# Patient Record
Sex: FEMALE | Race: ASIAN | NOT HISPANIC OR LATINO | URBAN - METROPOLITAN AREA
[De-identification: names, ages, dates, MRNs, and addresses within clinical notes are randomized per-mention and may not be internally consistent; named-entity substitution may affect disease eponyms.]

---

## 2017-06-21 PROBLEM — Z00.00 ENCOUNTER FOR PREVENTIVE HEALTH EXAMINATION: Status: ACTIVE | Noted: 2017-06-21

## 2020-08-11 ENCOUNTER — INPATIENT (INPATIENT)
Facility: HOSPITAL | Age: 65
LOS: 9 days | Discharge: HOME HEALTH | DRG: 124 | End: 2020-08-21
Attending: GENERAL PRACTICE | Admitting: GENERAL PRACTICE
Payer: MEDICARE

## 2020-08-11 VITALS
HEART RATE: 93 BPM | TEMPERATURE: 98 F | WEIGHT: 205.91 LBS | SYSTOLIC BLOOD PRESSURE: 155 MMHG | OXYGEN SATURATION: 97 % | DIASTOLIC BLOOD PRESSURE: 94 MMHG | HEIGHT: 62 IN | RESPIRATION RATE: 20 BRPM

## 2020-08-11 DIAGNOSIS — K50.919 CROHN'S DISEASE, UNSPECIFIED, WITH UNSPECIFIED COMPLICATIONS: ICD-10-CM

## 2020-08-11 DIAGNOSIS — Z29.9 ENCOUNTER FOR PROPHYLACTIC MEASURES, UNSPECIFIED: ICD-10-CM

## 2020-08-11 DIAGNOSIS — E78.5 HYPERLIPIDEMIA, UNSPECIFIED: ICD-10-CM

## 2020-08-11 DIAGNOSIS — E11.69 TYPE 2 DIABETES MELLITUS WITH OTHER SPECIFIED COMPLICATION: ICD-10-CM

## 2020-08-11 DIAGNOSIS — E11.40 TYPE 2 DIABETES MELLITUS WITH DIABETIC NEUROPATHY, UNSPECIFIED: ICD-10-CM

## 2020-08-11 DIAGNOSIS — E66.01 MORBID (SEVERE) OBESITY DUE TO EXCESS CALORIES: ICD-10-CM

## 2020-08-11 DIAGNOSIS — H57.89 OTHER SPECIFIED DISORDERS OF EYE AND ADNEXA: ICD-10-CM

## 2020-08-11 DIAGNOSIS — L03.211 CELLULITIS OF FACE: ICD-10-CM

## 2020-08-11 DIAGNOSIS — B02.30 ZOSTER OCULAR DISEASE, UNSPECIFIED: ICD-10-CM

## 2020-08-11 DIAGNOSIS — I10 ESSENTIAL (PRIMARY) HYPERTENSION: ICD-10-CM

## 2020-08-11 LAB
ALBUMIN SERPL ELPH-MCNC: 4.3 G/DL — SIGNIFICANT CHANGE UP (ref 3.3–5)
ALP SERPL-CCNC: 62 U/L — SIGNIFICANT CHANGE UP (ref 40–120)
ALT FLD-CCNC: 30 U/L — SIGNIFICANT CHANGE UP (ref 10–45)
ANION GAP SERPL CALC-SCNC: 16 MMOL/L — SIGNIFICANT CHANGE UP (ref 5–17)
AST SERPL-CCNC: 37 U/L — SIGNIFICANT CHANGE UP (ref 10–40)
BASOPHILS # BLD AUTO: 0.06 K/UL — SIGNIFICANT CHANGE UP (ref 0–0.2)
BASOPHILS NFR BLD AUTO: 0.7 % — SIGNIFICANT CHANGE UP (ref 0–2)
BILIRUB SERPL-MCNC: 0.4 MG/DL — SIGNIFICANT CHANGE UP (ref 0.2–1.2)
BUN SERPL-MCNC: 10 MG/DL — SIGNIFICANT CHANGE UP (ref 7–23)
CALCIUM SERPL-MCNC: 10 MG/DL — SIGNIFICANT CHANGE UP (ref 8.4–10.5)
CHLORIDE SERPL-SCNC: 94 MMOL/L — LOW (ref 96–108)
CO2 SERPL-SCNC: 24 MMOL/L — SIGNIFICANT CHANGE UP (ref 22–31)
CREAT SERPL-MCNC: 0.72 MG/DL — SIGNIFICANT CHANGE UP (ref 0.5–1.3)
EOSINOPHIL # BLD AUTO: 0.02 K/UL — SIGNIFICANT CHANGE UP (ref 0–0.5)
EOSINOPHIL NFR BLD AUTO: 0.2 % — SIGNIFICANT CHANGE UP (ref 0–6)
GLUCOSE BLDC GLUCOMTR-MCNC: 201 MG/DL — HIGH (ref 70–99)
GLUCOSE BLDC GLUCOMTR-MCNC: 213 MG/DL — HIGH (ref 70–99)
GLUCOSE SERPL-MCNC: 256 MG/DL — HIGH (ref 70–99)
HCT VFR BLD CALC: 42.9 % — SIGNIFICANT CHANGE UP (ref 34.5–45)
HGB BLD-MCNC: 13.5 G/DL — SIGNIFICANT CHANGE UP (ref 11.5–15.5)
IMM GRANULOCYTES NFR BLD AUTO: 0.7 % — SIGNIFICANT CHANGE UP (ref 0–1.5)
LYMPHOCYTES # BLD AUTO: 1.84 K/UL — SIGNIFICANT CHANGE UP (ref 1–3.3)
LYMPHOCYTES # BLD AUTO: 20.1 % — SIGNIFICANT CHANGE UP (ref 13–44)
MCHC RBC-ENTMCNC: 27.1 PG — SIGNIFICANT CHANGE UP (ref 27–34)
MCHC RBC-ENTMCNC: 31.5 GM/DL — LOW (ref 32–36)
MCV RBC AUTO: 86 FL — SIGNIFICANT CHANGE UP (ref 80–100)
MONOCYTES # BLD AUTO: 0.7 K/UL — SIGNIFICANT CHANGE UP (ref 0–0.9)
MONOCYTES NFR BLD AUTO: 7.6 % — SIGNIFICANT CHANGE UP (ref 2–14)
NEUTROPHILS # BLD AUTO: 6.49 K/UL — SIGNIFICANT CHANGE UP (ref 1.8–7.4)
NEUTROPHILS NFR BLD AUTO: 70.7 % — SIGNIFICANT CHANGE UP (ref 43–77)
NRBC # BLD: 0 /100 WBCS — SIGNIFICANT CHANGE UP (ref 0–0)
PLATELET # BLD AUTO: 389 K/UL — SIGNIFICANT CHANGE UP (ref 150–400)
POTASSIUM SERPL-MCNC: 3.8 MMOL/L — SIGNIFICANT CHANGE UP (ref 3.5–5.3)
POTASSIUM SERPL-SCNC: 3.8 MMOL/L — SIGNIFICANT CHANGE UP (ref 3.5–5.3)
PROT SERPL-MCNC: 7.6 G/DL — SIGNIFICANT CHANGE UP (ref 6–8.3)
RBC # BLD: 4.99 M/UL — SIGNIFICANT CHANGE UP (ref 3.8–5.2)
RBC # FLD: 15.8 % — HIGH (ref 10.3–14.5)
SARS-COV-2 RNA SPEC QL NAA+PROBE: SIGNIFICANT CHANGE UP
SODIUM SERPL-SCNC: 134 MMOL/L — LOW (ref 135–145)
WBC # BLD: 9.17 K/UL — SIGNIFICANT CHANGE UP (ref 3.8–10.5)
WBC # FLD AUTO: 9.17 K/UL — SIGNIFICANT CHANGE UP (ref 3.8–10.5)

## 2020-08-11 PROCEDURE — 99223 1ST HOSP IP/OBS HIGH 75: CPT

## 2020-08-11 PROCEDURE — 99285 EMERGENCY DEPT VISIT HI MDM: CPT

## 2020-08-11 RX ORDER — INSULIN LISPRO 100/ML
VIAL (ML) SUBCUTANEOUS
Refills: 0 | Status: DISCONTINUED | OUTPATIENT
Start: 2020-08-11 | End: 2020-08-21

## 2020-08-11 RX ORDER — GABAPENTIN 400 MG/1
300 CAPSULE ORAL AT BEDTIME
Refills: 0 | Status: DISCONTINUED | OUTPATIENT
Start: 2020-08-11 | End: 2020-08-21

## 2020-08-11 RX ORDER — DORZOLAMIDE HYDROCHLORIDE TIMOLOL MALEATE 20; 5 MG/ML; MG/ML
1 SOLUTION/ DROPS OPHTHALMIC
Refills: 0 | Status: DISCONTINUED | OUTPATIENT
Start: 2020-08-11 | End: 2020-08-21

## 2020-08-11 RX ORDER — INSULIN GLARGINE 100 [IU]/ML
30 INJECTION, SOLUTION SUBCUTANEOUS AT BEDTIME
Refills: 0 | Status: DISCONTINUED | OUTPATIENT
Start: 2020-08-11 | End: 2020-08-14

## 2020-08-11 RX ORDER — AMLODIPINE BESYLATE 2.5 MG/1
5 TABLET ORAL DAILY
Refills: 0 | Status: DISCONTINUED | OUTPATIENT
Start: 2020-08-11 | End: 2020-08-21

## 2020-08-11 RX ORDER — ATENOLOL 25 MG/1
50 TABLET ORAL DAILY
Refills: 0 | Status: DISCONTINUED | OUTPATIENT
Start: 2020-08-11 | End: 2020-08-21

## 2020-08-11 RX ORDER — LISINOPRIL 2.5 MG/1
40 TABLET ORAL DAILY
Refills: 0 | Status: DISCONTINUED | OUTPATIENT
Start: 2020-08-11 | End: 2020-08-13

## 2020-08-11 RX ORDER — DEXTROSE 50 % IN WATER 50 %
25 SYRINGE (ML) INTRAVENOUS ONCE
Refills: 0 | Status: DISCONTINUED | OUTPATIENT
Start: 2020-08-11 | End: 2020-08-21

## 2020-08-11 RX ORDER — ENOXAPARIN SODIUM 100 MG/ML
40 INJECTION SUBCUTANEOUS DAILY
Refills: 0 | Status: DISCONTINUED | OUTPATIENT
Start: 2020-08-11 | End: 2020-08-13

## 2020-08-11 RX ORDER — ACETAMINOPHEN 500 MG
650 TABLET ORAL ONCE
Refills: 0 | Status: COMPLETED | OUTPATIENT
Start: 2020-08-11 | End: 2020-08-11

## 2020-08-11 RX ORDER — MESALAMINE 400 MG
500 TABLET, DELAYED RELEASE (ENTERIC COATED) ORAL
Refills: 0 | Status: DISCONTINUED | OUTPATIENT
Start: 2020-08-11 | End: 2020-08-21

## 2020-08-11 RX ORDER — ACETAMINOPHEN 500 MG
650 TABLET ORAL EVERY 4 HOURS
Refills: 0 | Status: DISCONTINUED | OUTPATIENT
Start: 2020-08-11 | End: 2020-08-21

## 2020-08-11 RX ORDER — DEXTROSE 50 % IN WATER 50 %
15 SYRINGE (ML) INTRAVENOUS ONCE
Refills: 0 | Status: DISCONTINUED | OUTPATIENT
Start: 2020-08-11 | End: 2020-08-21

## 2020-08-11 RX ORDER — KETOROLAC TROMETHAMINE 30 MG/ML
15 SYRINGE (ML) INJECTION ONCE
Refills: 0 | Status: DISCONTINUED | OUTPATIENT
Start: 2020-08-11 | End: 2020-08-11

## 2020-08-11 RX ORDER — INSULIN GLARGINE 100 [IU]/ML
30 INJECTION, SOLUTION SUBCUTANEOUS EVERY MORNING
Refills: 0 | Status: DISCONTINUED | OUTPATIENT
Start: 2020-08-12 | End: 2020-08-14

## 2020-08-11 RX ORDER — ATORVASTATIN CALCIUM 80 MG/1
40 TABLET, FILM COATED ORAL AT BEDTIME
Refills: 0 | Status: DISCONTINUED | OUTPATIENT
Start: 2020-08-11 | End: 2020-08-21

## 2020-08-11 RX ORDER — TRAMADOL HYDROCHLORIDE 50 MG/1
50 TABLET ORAL EVERY 6 HOURS
Refills: 0 | Status: DISCONTINUED | OUTPATIENT
Start: 2020-08-11 | End: 2020-08-13

## 2020-08-11 RX ORDER — ACYCLOVIR SODIUM 500 MG
950 VIAL (EA) INTRAVENOUS ONCE
Refills: 0 | Status: COMPLETED | OUTPATIENT
Start: 2020-08-11 | End: 2020-08-11

## 2020-08-11 RX ORDER — LEVOTHYROXINE SODIUM 125 MCG
75 TABLET ORAL DAILY
Refills: 0 | Status: DISCONTINUED | OUTPATIENT
Start: 2020-08-11 | End: 2020-08-21

## 2020-08-11 RX ORDER — FENOFIBRATE,MICRONIZED 130 MG
145 CAPSULE ORAL DAILY
Refills: 0 | Status: DISCONTINUED | OUTPATIENT
Start: 2020-08-11 | End: 2020-08-21

## 2020-08-11 RX ORDER — SODIUM CHLORIDE 9 MG/ML
1000 INJECTION, SOLUTION INTRAVENOUS
Refills: 0 | Status: DISCONTINUED | OUTPATIENT
Start: 2020-08-11 | End: 2020-08-21

## 2020-08-11 RX ORDER — ACYCLOVIR SODIUM 500 MG
950 VIAL (EA) INTRAVENOUS EVERY 8 HOURS
Refills: 0 | Status: DISCONTINUED | OUTPATIENT
Start: 2020-08-11 | End: 2020-08-12

## 2020-08-11 RX ORDER — PANTOPRAZOLE SODIUM 20 MG/1
40 TABLET, DELAYED RELEASE ORAL
Refills: 0 | Status: DISCONTINUED | OUTPATIENT
Start: 2020-08-11 | End: 2020-08-21

## 2020-08-11 RX ORDER — DEXTROSE 50 % IN WATER 50 %
12.5 SYRINGE (ML) INTRAVENOUS ONCE
Refills: 0 | Status: DISCONTINUED | OUTPATIENT
Start: 2020-08-11 | End: 2020-08-21

## 2020-08-11 RX ORDER — ERYTHROMYCIN BASE 5 MG/GRAM
1 OINTMENT (GRAM) OPHTHALMIC (EYE)
Refills: 0 | Status: DISCONTINUED | OUTPATIENT
Start: 2020-08-11 | End: 2020-08-21

## 2020-08-11 RX ORDER — GLUCAGON INJECTION, SOLUTION 0.5 MG/.1ML
1 INJECTION, SOLUTION SUBCUTANEOUS ONCE
Refills: 0 | Status: DISCONTINUED | OUTPATIENT
Start: 2020-08-11 | End: 2020-08-21

## 2020-08-11 RX ADMIN — TRAMADOL HYDROCHLORIDE 50 MILLIGRAM(S): 50 TABLET ORAL at 23:03

## 2020-08-11 RX ADMIN — ATORVASTATIN CALCIUM 40 MILLIGRAM(S): 80 TABLET, FILM COATED ORAL at 22:18

## 2020-08-11 RX ADMIN — Medication 15 MILLIGRAM(S): at 21:06

## 2020-08-11 RX ADMIN — Medication 650 MILLIGRAM(S): at 15:50

## 2020-08-11 RX ADMIN — Medication 169 MILLIGRAM(S): at 17:13

## 2020-08-11 RX ADMIN — TRAMADOL HYDROCHLORIDE 50 MILLIGRAM(S): 50 TABLET ORAL at 22:18

## 2020-08-11 RX ADMIN — Medication 15 MILLIGRAM(S): at 18:53

## 2020-08-11 RX ADMIN — GABAPENTIN 300 MILLIGRAM(S): 400 CAPSULE ORAL at 22:18

## 2020-08-11 RX ADMIN — INSULIN GLARGINE 30 UNIT(S): 100 INJECTION, SOLUTION SUBCUTANEOUS at 23:30

## 2020-08-11 NOTE — H&P ADULT - PROBLEM SELECTOR PLAN 3
meds reviewed with pt's son and reconciled   will given lantus BID dosing, 30 units  RISS  add premeal as needed  endo as needed  monitor FS  A1C  resume Ozempic on DC   Neurontin for diabetic neuropathy

## 2020-08-11 NOTE — ED PROVIDER NOTE - CLINICAL SUMMARY MEDICAL DECISION MAKING FREE TEXT BOX
63 yo F with a pmhx of Insulin dependent DM, Crohn's disease on immunosuppressants present to the ED with eye redness and rash that started on Friday of last week. Vesicular rash over the left V1 trigeminal distribution included left eye lid and bridge of nose. consistent with herpes ophthalmicus. She has a immunocompromised state because of comorbidities stated in the HPI.      will obtain labs, consult opthalmology

## 2020-08-11 NOTE — ED ADULT NURSE NOTE - NSIMPLEMENTINTERV_GEN_ALL_ED
Implemented All Fall Risk Interventions:  [place] to call system. Call bell, personal items and telephone within reach. Instruct patient to call for assistance. Room bathroom lighting operational. Non-slip footwear when patient is off stretcher. Physically safe environment: no spills, clutter or unnecessary equipment. Stretcher in lowest position, wheels locked, appropriate side rails in place. Provide visual cue, wrist band, yellow gown, etc. Monitor gait and stability. Monitor for mental status changes and reorient to person, place, and time. Review medications for side effects contributing to fall risk. Reinforce activity limits and safety measures with patient and family.

## 2020-08-11 NOTE — ED ADULT NURSE REASSESSMENT NOTE - NS ED NURSE REASSESS COMMENT FT1
pt given sandwich and water.  sitting up eating without difficulty  md sanchez also made aware of pt temp 100.1 F

## 2020-08-11 NOTE — CONSULT NOTE ADULT - ATTENDING COMMENTS
I have interviewed and examined the patient and reviewed the residents note including the history, exam, assessment, and plan.  I agree with the residents assessment and plan.    Agree with above  Will follow    Davida Davenport MD

## 2020-08-11 NOTE — ED ADULT NURSE NOTE - NS ED NURSE LEVEL OF CONSCIOUSNESS ORIENTATION
Number Of Freeze-Thaw Cycles: 2 freeze-thaw cycles Post-Care Instructions: I reviewed with the patient in detail post-care instructions. Patient is to wear sunprotection, and avoid picking at any of the treated lesions. Pt may apply Vaseline to crusted or scabbing areas. Aperture Size (Optional): B Medical Necessity Clause: This procedure was medically necessary because the lesions that were treated were: Duration Of Freeze Thaw-Cycle (Seconds): 5 Render Note In Bullet Format When Appropriate: No Render Post-Care Instructions In Note?: yes Detail Level: Detailed Consent: The patient's consent was obtained including but not limited to risks of crusting, scabbing, blistering, scarring, darker or lighter pigmentary change, recurrence, incomplete removal and infection. Consent: The patient's verbal consent was obtained including but not limited to risks of crusting, scabbing, blistering, scarring, darker or lighter pigmentary change, recurrence, incomplete removal and infection. Duration Of Freeze Thaw-Cycle (Seconds): 6 Medical Necessity Information: It is in your best interest to select a reason for this procedure from the list below. All of these items fulfill various CMS LCD requirements except the new and changing color options. Oriented - self; Oriented - place; Oriented - time

## 2020-08-11 NOTE — ED PROVIDER NOTE - OBJECTIVE STATEMENT
63 yo F with a pmhx of Insulin dependent DM, Crohn's disease on immunosuppressants present to the ED with eye redness and rash that started on friday of last week. She was seen in urgent care and was started on doxycycline. Her rash is localized over her left eye and forehead. She denies rashes anywhere else. She denies any vision changes. She denies any pain upon moving the eye. She denies any CP, SOB, abdominal pain, urinary symptoms, FND, numbness or tingling, tinnitus, ear pain.

## 2020-08-11 NOTE — ED PROVIDER NOTE - PHYSICAL EXAMINATION
GENERAL: no acute distress, non-toxic appearing  HEAD: normocephalic, atraumatic    HEENT: normal conjunctiva, oral mucosa moist, neck supple, (+) vescicular rash over left forehead involving the eyelid included the bridge of the nose, the tip of the nose is spared, poor dentition,     CARDIAC: regular rate and rhythm, normal S1 and S2,  no appreciable murmurs  PULM: clear to ascultation bilaterally, no crackles, rales, rhonchi, or wheezing  GI: abdomen nondistended, soft, nontender, no guarding or rebound tenderness  : no CVA tenderness, no suprapubic tenderness  NEURO: alert and oriented x 3, normal speech, PERRLA, EOMI, no focal motor or sensory deficits  MSK: no visible deformities, no peripheral edema, calf tenderness/redness/swelling  SKIN: no visible rashes, dry, well-perfused  PSYCH: appropriate mood and affect

## 2020-08-11 NOTE — CONSULT NOTE ADULT - SUBJECTIVE AND OBJECTIVE BOX
[facility] DEPARTMENT OF OPHTHALMOLOGY - INITIAL ADULT CONSULT  -----------------------------------------------------------------------------------------------------------------  Quyen Florentino MD PGY-III  Pager: [phone]/[facility]: [phone]  -----------------------------------------------------------------------------------------------------------------    HPI:  65 yo F with a pmhx of Insulin dependent DM, Crohn's disease on immunosuppressants present to the ED with eye redness and rash that started on friday of last week. She reports she saw an eye doctor in [place] yesterday, Dr. Joby Vazquez who started her on tobramycin drops and PO doxycyline and recommended she come to the ED. Her rash is localized over her left eye and forehead. She denies rashes anywhere else. She denies any vision changes. She denies any pain upon moving the eye but reports pain in the left upper lid. She denies any CP, SOB, abdominal pain, urinary symptoms, FND, numbness or tingling, tinnitus, ear pain       PAST MEDICAL & SURGICAL HISTORY: Crohns , DM     Past Ocular History: b/l CEIOL OU    FAMILY HISTORY: denies    Social History: denies    Ophthalmic Medications: as above    Allergies & Intolerances: denies    Review of Systems:  Constitutional: No fever, chills  Eyes: No blurry vision, flashes, floaters, FBS, erythema, discharge, double vision, OU  Neuro: No tremors  Cardiovascular: No chest pain, palpitations  Respiratory: No SOB, no cough  GI: No nausea, vomiting, abdominal pain  : No dysuria  Skin: no rash  Psych: no depression  Endocrine: no polyuria, polydipsia  Heme/lymph: no swelling    VITALS: T(C): 37.3 (08-11-20 @ 11:39)  T(F): 99.2 (08-11-20 @ 11:39), Max: 99.2 (08-11-20 @ 11:39)  HR: 93 (08-11-20 @ 11:39) (93 - 93)  BP: 158/88 (08-11-20 @ 11:39) (155/94 - 158/88)  RR:  (18 - 20)  SpO2:  (97% - 98%)  Wt(kg): --  General: AAO x 3, appropriate mood and affect    Ophthalmology Exam:  Visual acuity (cc): 20/25 OU  Pupils: PERRL OU, no APD  Ttono: 1 OU  Extraocular movements (EOMs): Full OU, no pain, no diplopia  Confrontational Visual Field (CVF): Full OU  Color Plates: 12/12 OU    Pen Light Exam (PLE)  External: Flat OU  Lids/Lashes/Lacrimal Ducts: Flat OU    Sclera/Conjunctiva: W+Q OU  Cornea: Cl OU  Anterior Chamber: D+F OU    Iris: Flat OU  Lens: Cl OU    Fundus Exam: dilated with 1% tropicamide and 2.5% phenylephrine  Approval obtained from primary team for dilation  Patient aware that pupils can remained dilated for at least 4-6 hours  Exam performed with 20D lens    Vitreous: wnl OU  Disc, cup/disc: sharp and pink, 0.4 OU  Macula: wnl OU  Vessels: wnl OU  Periphery: wnl OU    Labs/Imaging:  *** [facility] DEPARTMENT OF OPHTHALMOLOGY - INITIAL ADULT CONSULT  -----------------------------------------------------------------------------------------------------------------  Quyen Florentino MD PGY-III  Pager: [phone]/[facility]: [phone]  -----------------------------------------------------------------------------------------------------------------    HPI:  63 yo F with a pmhx of Insulin dependent DM, Crohn's disease on immunosuppressants present to the ED with eye redness and rash that started on friday of last week. She reports she saw an eye doctor in [place] yesterday, Dr. Joby Vazquez who started her on tobramycin drops and PO doxycyline and recommended she come to the ED. Her rash is localized over her left eye and forehead. She denies rashes anywhere else. She denies any vision changes. She denies any pain upon moving the eye but reports pain in the left upper lid. She denies any CP, SOB, abdominal pain, urinary symptoms, FND, numbness or tingling, tinnitus, ear pain       PAST MEDICAL & SURGICAL HISTORY: Crohns , DM     Past Ocular History: b/l CEIOL OU    FAMILY HISTORY: denies    Social History: denies    Ophthalmic Medications: as above    Allergies & Intolerances: denies    Review of Systems:  Constitutional: No fever, chills  Eyes: No blurry vision, flashes, floaters, FBS, erythema, discharge, double vision, OU  Neuro: No tremors  Cardiovascular: No chest pain, palpitations  Respiratory: No SOB, no cough  GI: No nausea, vomiting, abdominal pain  : No dysuria  Skin: no rash  Psych: no depression  Endocrine: no polyuria, polydipsia  Heme/lymph: no swelling    VITALS: T(C): 37.3 (08-11-20 @ 11:39)  T(F): 99.2 (08-11-20 @ 11:39), Max: 99.2 (08-11-20 @ 11:39)  HR: 93 (08-11-20 @ 11:39) (93 - 93)  BP: 158/88 (08-11-20 @ 11:39) (155/94 - 158/88)  RR:  (18 - 20)  SpO2:  (97% - 98%)  Wt(kg): --  General: AAO x 3, appropriate mood and affect    Ophthalmology Exam:  Visual acuity (cc): 20/25 OU  Pupils: PERRL OU, no APD  Ttono: 15 OD 24 OS  Extraocular movements (EOMs): Full OU, no pain, no diplopia  Confrontational Visual Field (CVF): Full OU  Color Plates: 12/12 OU    Slit Lamp Exam (SLE)  External: WNL OD erythematous rash of V1 distribution OS   Lids/Lashes/Lacrimal Ducts: WNL OU    Sclera/Conjunctiva: W+Q OU  Cornea: Clear OU  Anterior Chamber: quiet without cells/flare OU    Iris: Reactive OU  Lens: Clear OU     Fundus Exam: dilated with 1% tropicamide and 2.5% phenylephrine  Approval obtained from primary team for dilation  Patient aware that pupils can remained dilated for at least 4-6 hours  Exam performed with 20D lens    Vitreous: wnl OU  Disc, cup/disc: sharp and pink, 0.4 OU  Macula: wnl OU  Vessels: wnl OU  Periphery: wnl OU    Labs/Imaging:  *** [facility] DEPARTMENT OF OPHTHALMOLOGY - INITIAL ADULT CONSULT  -----------------------------------------------------------------------------------------------------------------  Quyen Florentino MD PGY-III  Pager: [phone]/[facility]: [phone]  -----------------------------------------------------------------------------------------------------------------    HPI:  63 yo F with a pmhx of Insulin dependent DM, Crohn's disease on immunosuppressants present to the ED with eye redness and rash that started on friday of last week. She reports she saw an eye doctor in [place] yesterday, Dr. Joby Vazquez who started her on tobramycin drops and PO doxycyline and recommended she come to the ED. Her rash is localized over her left eye and forehead. She denies rashes anywhere else. She denies any vision changes. She denies any pain upon moving the eye but reports pain in the left upper lid. She denies any CP, SOB, abdominal pain, urinary symptoms, FND, numbness or tingling, tinnitus, ear pain       PAST MEDICAL & SURGICAL HISTORY: Crohns , DM     Past Ocular History: b/l CEIOL OU    FAMILY HISTORY: denies    Social History: denies    Ophthalmic Medications: as above    Allergies & Intolerances: denies    Review of Systems:  Constitutional: No fever, chills  Eyes: No blurry vision, flashes, floaters, FBS, erythema, discharge, double vision, OU  Neuro: No tremors  Cardiovascular: No chest pain, palpitations  Respiratory: No SOB, no cough  GI: No nausea, vomiting, abdominal pain  : No dysuria  Skin: no rash  Psych: no depression  Endocrine: no polyuria, polydipsia  Heme/lymph: no swelling    VITALS: T(C): 37.3 (08-11-20 @ 11:39)  T(F): 99.2 (08-11-20 @ 11:39), Max: 99.2 (08-11-20 @ 11:39)  HR: 93 (08-11-20 @ 11:39) (93 - 93)  BP: 158/88 (08-11-20 @ 11:39) (155/94 - 158/88)  RR:  (18 - 20)  SpO2:  (97% - 98%)  Wt(kg): --  General: AAO x 3, appropriate mood and affect    Ophthalmology Exam:  Visual acuity (cc): 20/25 OU  Pupils: PERRL OU, no APD  Ttono: 15 OD 24 OS  Extraocular movements (EOMs): Full OU, no pain, no diplopia  Confrontational Visual Field (CVF): Full OU  Color Plates: 12/12 OU    Slit Lamp Exam (SLE)  External: WNL OD erythematous rash of V1 distribution in multiple stages of healing OS   Lids/Lashes/Lacrimal Ducts: WNL OD upper lid edema, rash, erythema OS  Sclera/Conjunctiva: W+Q OD 1+ Injection OS  Cornea: Clear OD Multiple discrete punctate lesions of uptake at 3, 4, 5, 6, 7 o clock hours  Anterior Chamber: quiet without cells/flare OU    Iris: Reactive OU  Lens: PCIOL OU     Fundus Exam: dilated with 1% tropicamide and 2.5% phenylephrine  Approval obtained from primary team for dilation  Patient aware that pupils can remained dilated for at least 4-6 hours  Exam performed with 20D lens    Vitreous: wnl OU  Disc, cup/disc: sharp and pink, 0.4 OU  Macula: wnl OU  Vessels: wnl OU  Periphery: wnl OU    Labs/Imaging:  *** [facility] DEPARTMENT OF OPHTHALMOLOGY - INITIAL ADULT CONSULT  -----------------------------------------------------------------------------------------------------------------  Quyen Florentino MD PGY-III  Pager: [phone]/[facility]: [phone]  -----------------------------------------------------------------------------------------------------------------    HPI:  65 yo F with a pmhx of Insulin dependent DM, Crohn's disease on immunosuppressants present to the ED with eye redness and rash that started on friday of last week. She reports she saw an eye doctor in [place] yesterday, Dr. Joby Vazquez who started her on tobramycin drops and PO doxycyline and recommended she come to the ED. Her rash is localized over her left eye and forehead. She denies rashes anywhere else. She denies any vision changes. She denies any pain upon moving the eye but reports pain in the left upper lid. She denies any CP, SOB, abdominal pain, urinary symptoms, FND, numbness or tingling, tinnitus, ear pain       PAST MEDICAL & SURGICAL HISTORY: Crohns , DM     Past Ocular History: b/l CEIOL OU    FAMILY HISTORY: denies, no glaucoma    Social History: denies, no etoh/smoking    Ophthalmic Medications: as above    Allergies & Intolerances: denies    Review of Systems:  Constitutional: No fever, chills  Eyes: No blurry vision, flashes, floaters, FBS, erythema, discharge, double vision, OU  Neuro: No tremors  Cardiovascular: No chest pain, palpitations  Respiratory: No SOB, no cough  GI: No nausea, vomiting, abdominal pain  : No dysuria  Skin: no rash  Psych: no depression  Endocrine: no polyuria, polydipsia  Heme/lymph: no swelling    VITALS: T(C): 37.3 (08-11-20 @ 11:39)  T(F): 99.2 (08-11-20 @ 11:39), Max: 99.2 (08-11-20 @ 11:39)  HR: 93 (08-11-20 @ 11:39) (93 - 93)  BP: 158/88 (08-11-20 @ 11:39) (155/94 - 158/88)  RR:  (18 - 20)  SpO2:  (97% - 98%)  Wt(kg): --  General: AAO x 3, appropriate mood and affect    Ophthalmology Exam:  Visual acuity (cc): 20/25 OU  Pupils: PERRL OU, no APD  Ttono: 15 OD 24 OS  Extraocular movements (EOMs): Full OU, no pain, no diplopia  Confrontational Visual Field (CVF): Full OU  Color Plates: 12/12 OU    Slit Lamp Exam (SLE)  External: WNL OD erythematous rash of V1 distribution in multiple stages of healing OS   Lids/Lashes/Lacrimal Ducts: WNL OD 1+ upper lid edema, rash, erythema OS  Sclera/Conjunctiva: W+Q OD 1+ Injection OS  Cornea: Clear OD Multiple discrete punctate areas of staining at 3, 4, 5, 6, 7 o clock hours, no inifiltrate OS  Anterior Chamber: quiet without cells/flare OU    Iris: Reactive, flat OU  Lens: PCIOL OU     Fundus Exam: dilated with 1% tropicamide and 2.5% phenylephrine  Approval obtained from primary team for dilation  Patient aware that pupils can remained dilated for at least 4-6 hours  Exam performed with 20D lens    Vitreous: wnl OU  Disc, cup/disc: sharp and pink, 0.4 OU  Macula: wnl OU  Vessels: wnl OU  Periphery: wnl OU    Labs/Imaging:  ***

## 2020-08-11 NOTE — H&P ADULT - PROBLEM SELECTOR PLAN 2
likely some degree of superimposed cellulitis  already started on Doxy > will continue for now  ID to eval   monitor for fever.

## 2020-08-11 NOTE — H&P ADULT - PROBLEM SELECTOR PLAN 1
ophtho consulted, evaluating  further recom pending consult  pt post Acyclovir > continue   ID consulted as well to evaluate  supportive care  pain mgmt

## 2020-08-11 NOTE — H&P ADULT - HISTORY OF PRESENT ILLNESS
>>>>>>Medical Attending Initial / Admission note<<<<<<  -------------------------------------------------------------------------------  CHIEF COMPLAINT & HISTORY OF PRESENT ILLNESS:      Pt is a 65 yo woman with a pmhx of Insulin dependent DM, Hypothyroidism, Crohn's disease on immunosuppressants present to the ED with eye redness and pain, and rash that started about 5 days ago,   She was seen in urgent care and was started on Tobramycin eye drops ~ 3days ago, and seen by ophthalmologist yesterday and given doxycycline which she took total 2 doses) with no improvement and came to ED today   Her rash is localized over her left eye and forehead. She denies rashes anywhere else. She denies any vision changes. c/o pain  and pressure on that side of the face   She denies any CP, SOB, abdominal pain, urinary symptoms, numbness or tingling, tinnitus, ear pain.  + feverish on and off for the past few days at home and took tylenol as needed   in ED also febrile, not resolved with tylenols     --------------------------------------------------------------------------------  PAST MEDICAL HISTORY:    DM  Crohn's  Hypothyroidism     --------------------------------------------------------------------------------  PAST SURGICAL HISTORY:    Bilateral cataract Sx  Rt knee sx    --------------------------------------------------------------------------------  FAMILY HISTORY:    none / denies   --------------------------------------------------------------------------------  SOCIAL HISTORY:  Alcohol: None reported  Smoking: None reported     --------------------------------------------------------------------------------  ALLERGIES:    [As shireen, reviewed]    --------------------------------------------------------------------------------  MEDICATIONS:   [As shireen, reviewed]    --------------------------------------------------------------------------------  REVIEW OF SYSTEM:    GEN: + fever, no chills, + pain as above , no vial changes   RESP: no SOB, no cough, no sputum  CVS: no chest pain, no palpitations, no edema  GI: no abdominal pain, no nausea, no vomiting, no constipation, no diarrhea  : no dysuria, no frequency, no hematuria  Neuro: no headache, no dizziness  PSYCH: no anxiety, no depression  Derm : no itching, + rash, pain and swelling as above     --------------------------------------------------------------------------------  VITAL SIGNS:     T(C): 37.9 (08-11-20 @ 17:13), Max: 37.9 (08-11-20 @ 17:13)  HR: 94 (08-11-20 @ 17:13) (90 - 94)  BP: 133/63 (08-11-20 @ 17:13) (133/63 - 158/88)  BP(mean): 108 (08-11-20 @ 11:39)  RR: 18 (08-11-20 @ 17:13) (18 - 20)  SpO2: 98% (08-11-20 @ 17:13) (97% - 98%)     --------------------------------------------------------------------------------  PHYSICAL EXAM:    GEN: A&O X 3 , NAD , comfortable  HEENT: PERRL, MMM, hearing intact     left facial swelling and redness, some pustules.. left conjunctival injection and edema   Neck: supple , no JVD  CVS: S1S2 , regular , No M/R/G appreciated  PULM: CTA B/L,  limited exam due to body habitus.   ABD.: soft. non tender, non distended,  obese   Extrem: intact pulses , no edema   Derm: No rash , no ecchymoses  PSYCH : normal mood,  no delusion not anxious    --------------------------------------------------------------------------------  LAB AND IMAGING:   [As shireen, reviewed]    --------------------------------------------------------------------------------  ASSESSMENT AND PLAN:   [As shireen]    --------------------  Case discussed with pt, RN, ED, ID   ___________________________  H. JOÃO Forman.  Pager: [phone] >>>>>>Medical Attending Initial / Admission note<<<<<<  -------------------------------------------------------------------------------  CHIEF COMPLAINT & HISTORY OF PRESENT ILLNESS:      Pt is a 63 yo woman with a pmhx of Insulin dependent DM, Hypothyroidism, HLD, HTN, neuropathy, Crohn's disease on immunosuppressants present to the ED with eye redness and pain, and rash that started about 5 days ago,   She was seen in urgent care and was started on Tobramycin eye drops ~ 3days ago, and seen by ophthalmologist yesterday and given doxycycline which she took total 2 doses) with no improvement and came to ED today   Her rash is localized over her left eye and forehead. She denies rashes anywhere else. She denies any vision changes. c/o pain  and pressure on that side of the face   She denies any CP, SOB, abdominal pain, urinary symptoms, numbness or tingling, tinnitus, ear pain.  + feverish on and off for the past few days at home and took tylenol as needed   in ED also febrile, not resolved with tylenols     --------------------------------------------------------------------------------  PAST MEDICAL HISTORY:    DM  Crohn's  Hypothyroidism  HLD  HTN  neuropathy    --------------------------------------------------------------------------------  PAST SURGICAL HISTORY:    Bilateral cataract Sx  Rt knee sx    --------------------------------------------------------------------------------  FAMILY HISTORY:    none / denies   --------------------------------------------------------------------------------  SOCIAL HISTORY:  Alcohol: None reported  Smoking: None reported     --------------------------------------------------------------------------------  ALLERGIES:    [As shireen, reviewed]    --------------------------------------------------------------------------------  MEDICATIONS:   [As shireen, reviewed]    --------------------------------------------------------------------------------  REVIEW OF SYSTEM:    GEN: + fever, no chills, + pain as above , no vial changes   RESP: no SOB, no cough, no sputum  CVS: no chest pain, no palpitations, no edema  GI: no abdominal pain, no nausea, no vomiting, no constipation, no diarrhea  : no dysuria, no frequency, no hematuria  Neuro: no headache, no dizziness  PSYCH: no anxiety, no depression  Derm : no itching, + rash, pain and swelling as above     --------------------------------------------------------------------------------  VITAL SIGNS:     T(C): 37.9 (08-11-20 @ 17:13), Max: 37.9 (08-11-20 @ 17:13)  HR: 94 (08-11-20 @ 17:13) (90 - 94)  BP: 133/63 (08-11-20 @ 17:13) (133/63 - 158/88)  BP(mean): 108 (08-11-20 @ 11:39)  RR: 18 (08-11-20 @ 17:13) (18 - 20)  SpO2: 98% (08-11-20 @ 17:13) (97% - 98%)     --------------------------------------------------------------------------------  PHYSICAL EXAM:    GEN: A&O X 3 , NAD , comfortable  HEENT: PERRL, MMM, hearing intact     left facial swelling and redness, some pustules.. left conjunctival injection and edema   Neck: supple , no JVD  CVS: S1S2 , regular , No M/R/G appreciated  PULM: CTA B/L,  limited exam due to body habitus.   ABD.: soft. non tender, non distended,  obese   Extrem: intact pulses , no edema   Derm: No rash , no ecchymoses  PSYCH : normal mood,  no delusion not anxious    --------------------------------------------------------------------------------  LAB AND IMAGING:   [As shireen, reviewed]    --------------------------------------------------------------------------------  ASSESSMENT AND PLAN:   [As shireen]    --------------------  Case discussed with pt, RN, ED, ID   ___________________________  HAngela Forman D.O.  Pager: [phone]

## 2020-08-11 NOTE — PATIENT PROFILE ADULT - LEGAL HELP
ANTICOAGULATION FOLLOW-UP CLINIC VISIT    Patient Name:  Malcolm Barker  Date:  3/5/2018  Contact Type:  Face to Face    SUBJECTIVE:     Patient Findings     Positives No Problem Findings    Comments Denies bleeding/bruising or missed dose. Has been eating increased greens lately. No other changes in meds/supplements. Advised to decrease greens in diet, eat in moderation. Pt agrees.              OBJECTIVE    INR Protime   Date Value Ref Range Status   03/05/2018 2.0 (A) 0.86 - 1.14 Final       ASSESSMENT / PLAN  INR assessment THER    Recheck INR In: 2 WEEKS    INR Location Clinic      Anticoagulation Summary as of 3/5/2018     INR goal 2.0-3.0   Today's INR 2.0   Maintenance plan 2.5 mg (5 mg x 0.5) on Wed, Sat; 5 mg (5 mg x 1) all other days   Full instructions 2.5 mg on Wed, Sat; 5 mg all other days   Weekly total 30 mg   No change documented Yudy Plunkett RN   Plan last modified Mandi Phoenix RN (6/10/2016)   Next INR check 3/19/2018   Priority INR   Target end date Indefinite    Indications   Long-term (current) use of anticoagulants [Z79.01] [Z79.01]  Chronic atrial fibrillation (H) [I48.2]         Anticoagulation Episode Summary     INR check location     Preferred lab     Send INR reminders to [facility]    Comments 5mg tabs // APPT CARD      Anticoagulation Care Providers     Provider Role Specialty Phone number    Tod Callahan MD  Internal Medicine [phone]            See the Encounter Report to view Anticoagulation Flowsheet and Dosing Calendar (Go to Encounters tab in chart review, and find the Anticoagulation Therapy Visit)    Yudy Plunkett RN                no

## 2020-08-11 NOTE — ED PROVIDER NOTE - NS ED ROS FT
GENERAL: no fever, chills  HEENT: no cough, congestion, odynophagia, dysphagia, no vision changes, no pain on eye movement,     CARDIAC: no chest pain, palpitations, lightheadedness  PULM: no dyspnea, wheezing   GI: no abdominal pain, nausea, vomiting, diarrhea, constipation, melena, hematochezia  : no urinary dysuria, frequency, incontinence, hematuria  NEURO: no headache, motor weakness, sensory changes  MSK: no joint or muscle pain    SKIN: (+) rash on face    HEME: no active bleeding, bruising

## 2020-08-11 NOTE — H&P ADULT - ASSESSMENT
63 yo woman with a pmhx of Insulin dependent DM, Hypothyroidism, HLD, HTN, neuropathy, Crohn's disease on immunosuppressants present to the ED with eye redness and pain, and rash that started about 5 days prior

## 2020-08-11 NOTE — CONSULT NOTE ADULT - ASSESSMENT
Assessment and Recommendations:  64y female w/ pmhx/ochx of Crohns on immunosuppresion, DM, HTN, HLD consulted for zoster of V1 distribution found to have early signs of zoster keratitis, in addition to conjunctivitis and slightly elevated pressure suggestive of a trabeculitis. Would recommend treatment as below for zoster ophthalmicus of the left eye.    1. Herpes zoster ophthalmicus, Left eye  - Recommend systemic antiviral, typically valtrex 1000g PO TID however please defer route/dosage as per primary team with regards to kidney function and hx of immune suppresion  - erythromycin ophthalmic ointment QID to the left eye, into the eye and onto the eyelid lesions  - dorzolomide/timolol (COSOPT) eyedrops BID to the left eye    Patient was seen and discussed with Dr. Davenport.    Outpatient follow-up: Patient should follow-up with his/her ophthalmologist or with [facility] Department of Ophthalmology within 1 week of after discharge at:    [address]  [place] [address]  [phone]    Quyen Florentino MD, PGY-III  Pager: [phone]/[facility]: [phone]  Also available on Microsoft Teams Assessment and Recommendations:  64y female w/ pmhx/ochx of Crohns on immunosuppresion, DM, HTN, HLD consulted for zoster of V1 distribution found to have early signs of zoster keratitis, in addition to conjunctivitis and slightly elevated pressure suggestive of a trabeculitis. Would recommend treatment as below for zoster ophthalmicus of the left eye.  No evidence of uveitis at this time, so will hold off on steroids, however may add if IOP not controlled and corneal findings improve.    1. Herpes zoster ophthalmicus, Left eye  - Recommend systemic antiviral, typically valtrex 1000g PO TID however please defer route/dosage as per primary team with regards to kidney function and hx of immune suppresion.    - erythromycin ophthalmic ointment QID to the left eye, into the eye and onto the eyelid lesions around the eye  - dorzolomide/timolol (COSOPT) eyedrops BID to the left eye  - findings and plan discussed with patient and primary team  - will follow    Patient was seen and discussed with Dr. Davenport.    Outpatient follow-up: Patient should follow-up with his/her ophthalmologist or with [facility] Department of Ophthalmology within 1 week of after discharge, sooner if symptoms worsen or change at:    [address]  [place] [address]  [phone]    Quyen Florentino MD, PGY-III  Pager: [phone]/[facility]: [phone]  Also available on [employer] Teams

## 2020-08-11 NOTE — ED ADULT NURSE NOTE - OBJECTIVE STATEMENT
PT 64 year old female, A/O x3. PMH- HTN, DM, HLD, Crohn's. Bilateral cataract sx (5 years ago)Came in through triage due to left due redness and pain. PT states left eye pain and swelling Friday. On Saturday, facial rash began to form. Saturday went to [facility]- received eye drops and use steam for swelling. Went to eye doctor on Monday- received eye drops and antibiotics. Pupils 3mm, round, reactive, equal, left eye tearful. PT 64 year old female, A/O x3. PMH- HTN, DM, HLD, Crohn's. Bilateral cataract sx (5 years ago). Came in through triage due to left eye and facial redness and pain. PT states left eye pain and swelling started Friday. On Saturday, facial rash began to form. Saturday went to [facility]- received eye drops and instructed to use steam for swelling. Went to eye doctor on Monday- received eye drops and antibiotics. Also complaining of nausea, fever, cough and chills Pupils 3mm, round, reactive, equal, left eye tearful. Face- red, tender to touch, swelling, wheeping clear fluids, left ear tenderness. Denies chest pain, SOB, cough, vomiting, diarrhea, constipation, dysuria, numbness/ tingling. Safety maintained. IV- 20 LFT AC.

## 2020-08-11 NOTE — ED PROVIDER NOTE - ATTENDING CONTRIBUTION TO CARE
63 yo female with PMH as noted p/w rash c/w zoster.  ?ophthalmic involvement -- ophtho consult.  if their exam is unremarkable, likely can go home on PO antivirals (though has had sx >72h).  otherwise, will need admission.

## 2020-08-12 ENCOUNTER — TRANSCRIPTION ENCOUNTER (OUTPATIENT)
Age: 65
End: 2020-08-12

## 2020-08-12 LAB
A1C WITH ESTIMATED AVERAGE GLUCOSE RESULT: 7.5 % — HIGH (ref 4–5.6)
ALBUMIN SERPL ELPH-MCNC: 3.8 G/DL — SIGNIFICANT CHANGE UP (ref 3.3–5)
ALP SERPL-CCNC: 54 U/L — SIGNIFICANT CHANGE UP (ref 40–120)
ALT FLD-CCNC: 27 U/L — SIGNIFICANT CHANGE UP (ref 10–45)
ANION GAP SERPL CALC-SCNC: 15 MMOL/L — SIGNIFICANT CHANGE UP (ref 5–17)
AST SERPL-CCNC: 36 U/L — SIGNIFICANT CHANGE UP (ref 10–40)
BASOPHILS # BLD AUTO: 0.06 K/UL — SIGNIFICANT CHANGE UP (ref 0–0.2)
BASOPHILS NFR BLD AUTO: 0.6 % — SIGNIFICANT CHANGE UP (ref 0–2)
BILIRUB SERPL-MCNC: 0.4 MG/DL — SIGNIFICANT CHANGE UP (ref 0.2–1.2)
BUN SERPL-MCNC: 14 MG/DL — SIGNIFICANT CHANGE UP (ref 7–23)
CALCIUM SERPL-MCNC: 9.6 MG/DL — SIGNIFICANT CHANGE UP (ref 8.4–10.5)
CHLORIDE SERPL-SCNC: 93 MMOL/L — LOW (ref 96–108)
CHOLEST SERPL-MCNC: 149 MG/DL — SIGNIFICANT CHANGE UP (ref 10–199)
CO2 SERPL-SCNC: 25 MMOL/L — SIGNIFICANT CHANGE UP (ref 22–31)
CREAT SERPL-MCNC: 0.85 MG/DL — SIGNIFICANT CHANGE UP (ref 0.5–1.3)
EOSINOPHIL # BLD AUTO: 0.09 K/UL — SIGNIFICANT CHANGE UP (ref 0–0.5)
EOSINOPHIL NFR BLD AUTO: 0.9 % — SIGNIFICANT CHANGE UP (ref 0–6)
ESTIMATED AVERAGE GLUCOSE: 169 MG/DL — HIGH (ref 68–114)
GLUCOSE BLDC GLUCOMTR-MCNC: 135 MG/DL — HIGH (ref 70–99)
GLUCOSE BLDC GLUCOMTR-MCNC: 167 MG/DL — HIGH (ref 70–99)
GLUCOSE BLDC GLUCOMTR-MCNC: 182 MG/DL — HIGH (ref 70–99)
GLUCOSE BLDC GLUCOMTR-MCNC: 188 MG/DL — HIGH (ref 70–99)
GLUCOSE BLDC GLUCOMTR-MCNC: 244 MG/DL — HIGH (ref 70–99)
GLUCOSE SERPL-MCNC: 155 MG/DL — HIGH (ref 70–99)
HCT VFR BLD CALC: 38.4 % — SIGNIFICANT CHANGE UP (ref 34.5–45)
HDLC SERPL-MCNC: 38 MG/DL — LOW
HGB BLD-MCNC: 12.2 G/DL — SIGNIFICANT CHANGE UP (ref 11.5–15.5)
IMM GRANULOCYTES NFR BLD AUTO: 0.4 % — SIGNIFICANT CHANGE UP (ref 0–1.5)
LACTATE SERPL-SCNC: 2.6 MMOL/L — HIGH (ref 0.7–2)
LIPID PNL WITH DIRECT LDL SERPL: 77 MG/DL — SIGNIFICANT CHANGE UP
LYMPHOCYTES # BLD AUTO: 3.24 K/UL — SIGNIFICANT CHANGE UP (ref 1–3.3)
LYMPHOCYTES # BLD AUTO: 32.9 % — SIGNIFICANT CHANGE UP (ref 13–44)
MAGNESIUM SERPL-MCNC: 1.6 MG/DL — SIGNIFICANT CHANGE UP (ref 1.6–2.6)
MCHC RBC-ENTMCNC: 27.2 PG — SIGNIFICANT CHANGE UP (ref 27–34)
MCHC RBC-ENTMCNC: 31.8 GM/DL — LOW (ref 32–36)
MCV RBC AUTO: 85.5 FL — SIGNIFICANT CHANGE UP (ref 80–100)
MONOCYTES # BLD AUTO: 1.19 K/UL — HIGH (ref 0–0.9)
MONOCYTES NFR BLD AUTO: 12.1 % — SIGNIFICANT CHANGE UP (ref 2–14)
NEUTROPHILS # BLD AUTO: 5.22 K/UL — SIGNIFICANT CHANGE UP (ref 1.8–7.4)
NEUTROPHILS NFR BLD AUTO: 53.1 % — SIGNIFICANT CHANGE UP (ref 43–77)
NRBC # BLD: 0 /100 WBCS — SIGNIFICANT CHANGE UP (ref 0–0)
PLATELET # BLD AUTO: 348 K/UL — SIGNIFICANT CHANGE UP (ref 150–400)
POTASSIUM SERPL-MCNC: 3.6 MMOL/L — SIGNIFICANT CHANGE UP (ref 3.5–5.3)
POTASSIUM SERPL-SCNC: 3.6 MMOL/L — SIGNIFICANT CHANGE UP (ref 3.5–5.3)
PROT SERPL-MCNC: 6.8 G/DL — SIGNIFICANT CHANGE UP (ref 6–8.3)
RBC # BLD: 4.49 M/UL — SIGNIFICANT CHANGE UP (ref 3.8–5.2)
RBC # FLD: 15.8 % — HIGH (ref 10.3–14.5)
SODIUM SERPL-SCNC: 133 MMOL/L — LOW (ref 135–145)
TOTAL CHOLESTEROL/HDL RATIO MEASUREMENT: 4 RATIO — SIGNIFICANT CHANGE UP (ref 3.3–7.1)
TRIGL SERPL-MCNC: 173 MG/DL — HIGH (ref 10–149)
TSH SERPL-MCNC: 2.58 UIU/ML — SIGNIFICANT CHANGE UP (ref 0.27–4.2)
WBC # BLD: 9.84 K/UL — SIGNIFICANT CHANGE UP (ref 3.8–10.5)
WBC # FLD AUTO: 9.84 K/UL — SIGNIFICANT CHANGE UP (ref 3.8–10.5)

## 2020-08-12 RX ORDER — INSULIN ASPART 100 [IU]/ML
30 INJECTION, SOLUTION SUBCUTANEOUS
Qty: 0 | Refills: 0 | DISCHARGE

## 2020-08-12 RX ORDER — ATENOLOL 25 MG/1
1 TABLET ORAL
Qty: 0 | Refills: 0 | DISCHARGE

## 2020-08-12 RX ORDER — LEVOTHYROXINE SODIUM 125 MCG
1 TABLET ORAL
Qty: 0 | Refills: 0 | DISCHARGE

## 2020-08-12 RX ORDER — SODIUM CHLORIDE 9 MG/ML
1000 INJECTION INTRAMUSCULAR; INTRAVENOUS; SUBCUTANEOUS
Refills: 0 | Status: DISCONTINUED | OUTPATIENT
Start: 2020-08-12 | End: 2020-08-13

## 2020-08-12 RX ORDER — INSULIN ASPART 100 [IU]/ML
30 INJECTION, SUSPENSION SUBCUTANEOUS
Qty: 0 | Refills: 0 | DISCHARGE

## 2020-08-12 RX ORDER — BALSALAZIDE DISODIUM 750 MG/1
1 CAPSULE ORAL
Qty: 0 | Refills: 0 | DISCHARGE

## 2020-08-12 RX ORDER — ACYCLOVIR SODIUM 500 MG
500 VIAL (EA) INTRAVENOUS EVERY 8 HOURS
Refills: 0 | Status: DISCONTINUED | OUTPATIENT
Start: 2020-08-12 | End: 2020-08-13

## 2020-08-12 RX ORDER — ATORVASTATIN CALCIUM 80 MG/1
1 TABLET, FILM COATED ORAL
Qty: 0 | Refills: 0 | DISCHARGE

## 2020-08-12 RX ORDER — INSULIN GLARGINE 100 [IU]/ML
0 INJECTION, SOLUTION SUBCUTANEOUS
Qty: 0 | Refills: 0 | DISCHARGE

## 2020-08-12 RX ORDER — METFORMIN HYDROCHLORIDE 850 MG/1
1 TABLET ORAL
Qty: 0 | Refills: 0 | DISCHARGE

## 2020-08-12 RX ORDER — GABAPENTIN 400 MG/1
1 CAPSULE ORAL
Qty: 0 | Refills: 0 | DISCHARGE

## 2020-08-12 RX ORDER — FENOFIBRATE,MICRONIZED 130 MG
1 CAPSULE ORAL
Qty: 0 | Refills: 0 | DISCHARGE

## 2020-08-12 RX ORDER — SEMAGLUTIDE 0.68 MG/ML
1 INJECTION, SOLUTION SUBCUTANEOUS
Qty: 0 | Refills: 0 | DISCHARGE

## 2020-08-12 RX ORDER — AMLODIPINE BESYLATE 2.5 MG/1
1 TABLET ORAL
Qty: 0 | Refills: 0 | DISCHARGE

## 2020-08-12 RX ADMIN — LISINOPRIL 40 MILLIGRAM(S): 2.5 TABLET ORAL at 06:06

## 2020-08-12 RX ADMIN — Medication 100 MILLIGRAM(S): at 06:06

## 2020-08-12 RX ADMIN — DORZOLAMIDE HYDROCHLORIDE TIMOLOL MALEATE 1 DROP(S): 20; 5 SOLUTION/ DROPS OPHTHALMIC at 06:06

## 2020-08-12 RX ADMIN — Medication 500 MILLIGRAM(S): at 17:31

## 2020-08-12 RX ADMIN — Medication 1 APPLICATION(S): at 00:38

## 2020-08-12 RX ADMIN — Medication 1 APPLICATION(S): at 11:36

## 2020-08-12 RX ADMIN — Medication 1 APPLICATION(S): at 17:32

## 2020-08-12 RX ADMIN — Medication 75 MICROGRAM(S): at 06:06

## 2020-08-12 RX ADMIN — Medication 110 MILLIGRAM(S): at 17:38

## 2020-08-12 RX ADMIN — Medication 1: at 09:13

## 2020-08-12 RX ADMIN — INSULIN GLARGINE 30 UNIT(S): 100 INJECTION, SOLUTION SUBCUTANEOUS at 11:35

## 2020-08-12 RX ADMIN — AMLODIPINE BESYLATE 5 MILLIGRAM(S): 2.5 TABLET ORAL at 09:19

## 2020-08-12 RX ADMIN — ATORVASTATIN CALCIUM 40 MILLIGRAM(S): 80 TABLET, FILM COATED ORAL at 22:00

## 2020-08-12 RX ADMIN — Medication 500 MILLIGRAM(S): at 06:06

## 2020-08-12 RX ADMIN — Medication 145 MILLIGRAM(S): at 11:36

## 2020-08-12 RX ADMIN — Medication 1: at 11:36

## 2020-08-12 RX ADMIN — Medication 1 APPLICATION(S): at 06:06

## 2020-08-12 RX ADMIN — ENOXAPARIN SODIUM 40 MILLIGRAM(S): 100 INJECTION SUBCUTANEOUS at 11:36

## 2020-08-12 RX ADMIN — SODIUM CHLORIDE 50 MILLILITER(S): 9 INJECTION INTRAMUSCULAR; INTRAVENOUS; SUBCUTANEOUS at 21:59

## 2020-08-12 RX ADMIN — Medication 269 MILLIGRAM(S): at 10:41

## 2020-08-12 RX ADMIN — PANTOPRAZOLE SODIUM 40 MILLIGRAM(S): 20 TABLET, DELAYED RELEASE ORAL at 06:06

## 2020-08-12 RX ADMIN — ATENOLOL 50 MILLIGRAM(S): 25 TABLET ORAL at 06:05

## 2020-08-12 RX ADMIN — INSULIN GLARGINE 30 UNIT(S): 100 INJECTION, SOLUTION SUBCUTANEOUS at 22:00

## 2020-08-12 RX ADMIN — DORZOLAMIDE HYDROCHLORIDE TIMOLOL MALEATE 1 DROP(S): 20; 5 SOLUTION/ DROPS OPHTHALMIC at 17:31

## 2020-08-12 RX ADMIN — Medication 269 MILLIGRAM(S): at 00:38

## 2020-08-12 RX ADMIN — GABAPENTIN 300 MILLIGRAM(S): 400 CAPSULE ORAL at 22:00

## 2020-08-12 NOTE — PHARMACOTHERAPY INTERVENTION NOTE - COMMENTS
Completed medication reconciliation with patient. Patient knew her outpatient medication regimen. Please see below for her home medications:    Home Medications:  amLODIPine 5 mg oral tablet: 1 tab(s) orally once a day (12 Aug 2020 15:11)  atenolol 50 mg oral tablet: 1 tab(s) orally once a day (12 Aug 2020 15:11)  balsalazide 750 mg oral capsule: 1 cap(s) orally 2 times a day (12 Aug 2020 15:11)  Basaglar KwikPen 100 units/mL subcutaneous solution: takes 40/20 at home AM/PM !! (12 Aug 2020 15:11)  celecoxib 100 mg oral capsule: 1 cap(s) orally 2 times a day, As Needed (for knee pain) (12 Aug 2020 15:11)  gabapentin 300 mg oral capsule: 1 cap(s) orally 2 times a day (12 Aug 2020 15:11)  Lipitor 40 mg oral tablet: 1 tab(s) orally once a day (12 Aug 2020 15:11)  lisinopril 40 mg oral tablet: 1 tab(s) orally once a day (12 Aug 2020 15:11)  metFORMIN 500 mg oral tablet: 1 tab(s) orally 2 times a day (12 Aug 2020 15:11)  NovoLOG Mix 70/30 subcutaneous suspension: 30 unit(s) subcutaneous once a day (12 Aug 2020 15:11)  Ozempic (1 mg dose) 2 mg/1.5 mL subcutaneous solution: 1 application subcutaneous once a week (12 Aug 2020 15:11)  Synthroid 75 mcg (0.075 mg) oral tablet: 1 tab(s) orally once a day (12 Aug 2020 15:11)  TriCor 145 mg oral tablet: 1 tab(s) orally once a day (12 Aug 2020 15:11)    Ricardo Fitch, PharmD Candidate 2021  Panda Mancuso, PharmD, BCPS  [phone]

## 2020-08-12 NOTE — DISCHARGE NOTE NURSING/CASE MANAGEMENT/SOCIAL WORK - PATIENT PORTAL LINK FT
You can access the FollowMyHealth Patient Portal offered by [facility] by registering at the following website: [email or web address]. By joining [employer]’s FollowMyHealth portal, you will also be able to view your health information using other applications (apps) compatible with our system.

## 2020-08-12 NOTE — PROGRESS NOTE ADULT - ASSESSMENT
_________________________________________________________________________________________  ========>>  M E D I C A L   A T T E N D I N G    F O L L O W  U P  N O T E  <<=========  -----------------------------------------------------------------------------------------------------    - Patient seen and examined by me earlier today.   - In summary,  COLBY SAAB is a 64y year old woman who originally presented with facial pain   - Patient today overall doing ok, comfortable, eating OK. pain and swelling improved but still present     ==================>> REVIEW OF SYSTEM <<=================    GEN: no fever, no chills, pain as above   RESP: no SOB, no cough, no sputum  CVS: no chest pain, no palpitations  GI: no abdominal pain, no nausea, no constipation, no diarrhea  : no dysuria, no frequency, no hematuria  Neuro: no headache, no dizziness  Derm : no itching, no rash    ==================>> PHYSICAL EXAM <<=================    GEN: A&O X 3 , NAD , comfortable  HEENT: PERRL, MMM, hearing intact, injected conjunctiva,  facial zoster starting to crust over on mid forehead are   Neck: supple , no JVD appreciated  CVS: S1S2 , regular , No M/R/G appreciated  PULM: CTA B/L,  no W/R/R appreciated  ABD.: soft. non tender, non distended,  bowel sounds present, obese   Extrem: intact pulses , no edema   PSYCH : normal mood,  not anxious      ==================>> MEDICATIONS <<====================    MEDICATIONS  (STANDING):  acyclovir IVPB 500 milliGRAM(s) IV Intermittent every 8 hours  amLODIPine   Tablet 5 milliGRAM(s) Oral daily  ATENolol  Tablet 50 milliGRAM(s) Oral daily  atorvastatin 40 milliGRAM(s) Oral at bedtime  dextrose 5%. 1000 milliLiter(s) (50 mL/Hr) IV Continuous <Continuous>  dextrose 50% Injectable 12.5 Gram(s) IV Push once  dextrose 50% Injectable 25 Gram(s) IV Push once  dextrose 50% Injectable 25 Gram(s) IV Push once  dorzolamide 2%/timolol 0.5% Ophthalmic Solution 1 Drop(s) Left EYE two times a day  enoxaparin Injectable 40 milliGRAM(s) SubCutaneous daily  erythromycin   Ointment 1 Application(s) Left EYE four times a day  fenofibrate Tablet 145 milliGRAM(s) Oral daily  gabapentin 300 milliGRAM(s) Oral at bedtime  insulin glargine Injectable (LANTUS) 30 Unit(s) SubCutaneous every morning  insulin glargine Injectable (LANTUS) 30 Unit(s) SubCutaneous at bedtime  insulin lispro (HumaLOG) corrective regimen sliding scale   SubCutaneous three times a day before meals  levothyroxine 75 MICROGram(s) Oral daily  lisinopril 40 milliGRAM(s) Oral daily  mesalamine ER Capsule 500 milliGRAM(s) Oral two times a day  pantoprazole    Tablet 40 milliGRAM(s) Oral before breakfast  sodium chloride 0.9%. 1000 milliLiter(s) (50 mL/Hr) IV Continuous <Continuous>    MEDICATIONS  (PRN):  acetaminophen   Tablet .. 650 milliGRAM(s) Oral every 4 hours PRN Mild Pain (1 - 3)  dextrose 40% Gel 15 Gram(s) Oral once PRN Blood Glucose LESS THAN 70 milliGRAM(s)/deciliter  glucagon  Injectable 1 milliGRAM(s) IntraMuscular once PRN Glucose LESS THAN 70 milligrams/deciliter  traMADol 50 milliGRAM(s) Oral every 6 hours PRN Moderate Pain (4 - 6)    ___________  Active diet:  Diet, Regular:   Consistent Carbohydrate Evening Snack (CSTCHOSN)  Lacto-Ovo Veg (Accepts Milk Prod., Eggs)  ___________________    ==================>> VITAL SIGNS <<==================    T(C): 36.7 (08-12-20 @ 11:57), Max: 36.9 (08-11-20 @ 20:59)  HR: 89 (08-12-20 @ 11:57) (80 - 91)  BP: 109/78 (08-12-20 @ 11:57) (109/78 - 129/87)  RR: 18 (08-12-20 @ 11:57) (17 - 18)  SpO2: 91% (08-12-20 @ 11:57) (91% - 97%)       POCT Blood Glucose.: 135 mg/dL (12 Aug 2020 17:28)  POCT Blood Glucose.: 188 mg/dL (12 Aug 2020 11:32)  POCT Blood Glucose.: 182 mg/dL (12 Aug 2020 09:27)  POCT Blood Glucose.: 167 mg/dL (12 Aug 2020 08:17)  POCT Blood Glucose.: 201 mg/dL (11 Aug 2020 23:27)  POCT Blood Glucose.: 213 mg/dL (11 Aug 2020 21:43)    I&O's Summary    11 Aug 2020 07:01  -  12 Aug 2020 07:00  --------------------------------------------------------  IN: 250 mL / OUT: 0 mL / NET: 250 mL    12 Aug 2020 07:01  -  12 Aug 2020 18:26  --------------------------------------------------------  IN: 360 mL / OUT: 0 mL / NET: 360 mL       ==================>> LAB AND IMAGING <<==================                        12.2   9.84  )-----------( 348      ( 12 Aug 2020 07:19 )             38.4        08-12    133<L>  |  93<L>  |  14  ----------------------------<  155<H>  3.6   |  25  |  0.85    Sodium:   133  <==, 134  <==    Ca    9.6      12 Aug 2020 07:19  Mg     1.6     08-12    TPro  6.8  /  Alb  3.8  /  TBili  0.4  /  DBili  x   /  AST  36  /  ALT  27  /  AlkPhos  54  08-12                TSH:      2.58   (08-12-20)           Lipid profile:  (08-12-20)     Total: 149     LDL  : 77     HDL  :38     TG   :173     HgA1C:    (08-12-20)      7.5    ___________________________________________________________________________________  ===============>>  A S S E S S M E N T   A N D   P L A N <<===============  ------------------------------------------------------------------------------------------    · Assessment		  63 yo woman with a pmhx of Insulin dependent DM, Hypothyroidism, HLD, HTN, neuropathy, Crohn's disease on immunosuppressants present to the ED with eye redness and pain, and rash that started about 5 days prior       Problem/Plan - 1:  ·  Problem: Zoster ophthalmicus, facial shingles, conjunctivitis..   overall improved  ophtho and ID following, appreciated  pt post Acyclovir > continue as adjusted   ointment and drops as ordered   supportive care  pain mgmt.     Problem/Plan - 2:  ·  Problem: less likely Facial cellulitis  ID appreciated   monitor off systemic abx  erythromycin  Ointment as above     Problem/Plan - 3:  ·  Problem: Type 2 diabetes mellitus with other specified complication, with long-term current use of insulin.  Plan: meds reviewed with pt's son and reconciled   lantus BID dosing, 30 units  RISS  add premeal as needed  endo as needed  monitor FS  A1C 7.5  resume Ozempic on DC   Neurontin for diabetic neuropathy.     Problem/Plan - 4:  ·  Problem: Essential hypertension.  Plan: Continue Current medications and monitor.     Problem/Plan - 5:  ·  Problem: Hyperlipidemia, unspecified hyperlipidemia type.  Plan: Continue Current medications and monitor.     Problem/Plan - 6:  Problem: Class 3 severe obesity due to excess calories with serious comorbidity and body mass index (BMI) of 40.0 to 44.9 in adult. Plan: nutrition consult  - I had a long dissuasion with patient about life style changes, proper diet, exercise, weight loss, medication compliance, and close follow up and compliance with doctors.      Patient understood, all questions answered.     Problem/Plan - 7:  ·  Problem: Crohn's disease with complication, unspecified gastrointestinal tract location.  Plan: meds reviewed and reconciled with son  Balsalazide not formulary > changed to Mesalamine   monitor.     Problem/Plan - 8:  ·  Problem: Neuropathy due to type 2 diabetes mellitus.  Plan: Continue home medications and monitor.     Problem/Plan - 9:  ·  Problem: Need for prophylactic measure.  Plan: Lovenox  Protonix.     --------------------------------------------  Case discussed with pt, team   Education given on findings and plan of care  ___________________________  H. JOÃO Forman.  Pager: [phone]

## 2020-08-12 NOTE — PHARMACOTHERAPY INTERVENTION NOTE - COMMENTS
65 yo F currently on acyclovir for zoster ophthalmicus. Would recommend maintenance fluids with NS to prevent acyclovir-induced post-renal KAVIN.    Panda Mancuso, PharmD, BCPS  [phone]

## 2020-08-12 NOTE — CONSULT NOTE ADULT - SUBJECTIVE AND OBJECTIVE BOX
HPI:   Patient is a 64y female with crohns on oral therapy in control who developed pain and rash over the left  eye starting 5 days ago, took oral antibiotics and topical antibiotic drops which did not help,  now here with  V1 zoster. She can see fine out of the eye. She felt feverish with low grade temps at home. She has no known extradermatomal lesions. Mild headache but not confusion, no dizziness. She feels a bit nauseated    REVIEW OF SYSTEMS:  All other review of systems negative (Comprehensive ROS)    PAST MEDICAL & SURGICAL HISTORY:  Crohn    Allergies    No Known Allergies    Intolerances        Antimicrobials Day #  :2  acyclovir IVPB 500 milliGRAM(s) IV Intermittent every 8 hours    Other Medications:  acetaminophen   Tablet .. 650 milliGRAM(s) Oral every 4 hours PRN  amLODIPine   Tablet 5 milliGRAM(s) Oral daily  ATENolol  Tablet 50 milliGRAM(s) Oral daily  atorvastatin 40 milliGRAM(s) Oral at bedtime  dextrose 40% Gel 15 Gram(s) Oral once PRN  dextrose 5%. 1000 milliLiter(s) IV Continuous <Continuous>  dextrose 50% Injectable 12.5 Gram(s) IV Push once  dextrose 50% Injectable 25 Gram(s) IV Push once  dextrose 50% Injectable 25 Gram(s) IV Push once  dorzolamide 2%/timolol 0.5% Ophthalmic Solution 1 Drop(s) Left EYE two times a day  enoxaparin Injectable 40 milliGRAM(s) SubCutaneous daily  erythromycin   Ointment 1 Application(s) Left EYE four times a day  fenofibrate Tablet 145 milliGRAM(s) Oral daily  gabapentin 300 milliGRAM(s) Oral at bedtime  glucagon  Injectable 1 milliGRAM(s) IntraMuscular once PRN  insulin glargine Injectable (LANTUS) 30 Unit(s) SubCutaneous every morning  insulin glargine Injectable (LANTUS) 30 Unit(s) SubCutaneous at bedtime  insulin lispro (HumaLOG) corrective regimen sliding scale   SubCutaneous three times a day before meals  levothyroxine 75 MICROGram(s) Oral daily  lisinopril 40 milliGRAM(s) Oral daily  mesalamine ER Capsule 500 milliGRAM(s) Oral two times a day  pantoprazole    Tablet 40 milliGRAM(s) Oral before breakfast  traMADol 50 milliGRAM(s) Oral every 6 hours PRN      FAMILY HISTORY:      SOCIAL HISTORY:  Smoking: [ ]Yes [ x]No  ETOH: [ ]Yes x[ ]No  Drug Use: [ ]Yes [x ]No   [x ] Single[ ]    T(F): 97.6 (08-12-20 @ 05:29), Max: 100.2 (08-11-20 @ 17:13)  HR: 91 (08-12-20 @ 05:29)  BP: 111/63 (08-12-20 @ 05:29)  RR: 18 (08-12-20 @ 05:29)  SpO2: 93% (08-12-20 @ 05:29)  Wt(kg): --    PHYSICAL EXAM:  General: alert, no acute distress  Eyes:  anicteric, left eye conjunctival injection, no discharge. left forehead to hair over lid and over cheek upper area reddish with some crusting vesicles.   Oropharynx: no lesions or injection 	  Neck: supple, without adenopathy  Lungs: clear to auscultation  Heart: regular rate and rhythm; no murmur, rubs or gallops  Abdomen: soft, nondistended, nontender, without mass or organomegaly  Skin: over left eye vesicular esions  Extremities: no clubbing, cyanosis, or edema  Neurologic: alert, oriented, moves all extremities    LAB RESULTS:                        12.2   9.84  )-----------( 348      ( 12 Aug 2020 07:19 )             38.4     08-12    133<L>  |  93<L>  |  14  ----------------------------<  155<H>  3.6   |  25  |  0.85    Ca    9.6      12 Aug 2020 07:19  Mg     1.6     08-12    TPro  6.8  /  Alb  3.8  /  TBili  0.4  /  DBili  x   /  AST  36  /  ALT  27  /  AlkPhos  54  08-12    LIVER FUNCTIONS - ( 12 Aug 2020 07:19 )  Alb: 3.8 g/dL / Pro: 6.8 g/dL / ALK PHOS: 54 U/L / ALT: 27 U/L / AST: 36 U/L / GGT: x               MICROBIOLOGY:  RECENT CULTURES:        RADIOLOGY REVIEWED:          Impression:          Recommendations: HPI:   Patient is a 64y female with crohns on oral therapy in control who developed pain and rash over the left  eye starting 5 days ago, took oral antibiotics and topical antibiotic drops which did not help,  now here with  V1 zoster. She can see fine out of the eye. She felt feverish with low grade temps at home. She has no known extradermatomal lesions. Mild headache but not confusion, no dizziness. She feels a bit nauseated    REVIEW OF SYSTEMS:  All other review of systems negative (Comprehensive ROS)    PAST MEDICAL & SURGICAL HISTORY:  Crohn    Allergies    No Known Allergies    Intolerances        Antimicrobials Day #  :2  acyclovir IVPB 500 milliGRAM(s) IV Intermittent every 8 hours    Other Medications:  acetaminophen   Tablet .. 650 milliGRAM(s) Oral every 4 hours PRN  amLODIPine   Tablet 5 milliGRAM(s) Oral daily  ATENolol  Tablet 50 milliGRAM(s) Oral daily  atorvastatin 40 milliGRAM(s) Oral at bedtime  dextrose 40% Gel 15 Gram(s) Oral once PRN  dextrose 5%. 1000 milliLiter(s) IV Continuous <Continuous>  dextrose 50% Injectable 12.5 Gram(s) IV Push once  dextrose 50% Injectable 25 Gram(s) IV Push once  dextrose 50% Injectable 25 Gram(s) IV Push once  dorzolamide 2%/timolol 0.5% Ophthalmic Solution 1 Drop(s) Left EYE two times a day  enoxaparin Injectable 40 milliGRAM(s) SubCutaneous daily  erythromycin   Ointment 1 Application(s) Left EYE four times a day  fenofibrate Tablet 145 milliGRAM(s) Oral daily  gabapentin 300 milliGRAM(s) Oral at bedtime  glucagon  Injectable 1 milliGRAM(s) IntraMuscular once PRN  insulin glargine Injectable (LANTUS) 30 Unit(s) SubCutaneous every morning  insulin glargine Injectable (LANTUS) 30 Unit(s) SubCutaneous at bedtime  insulin lispro (HumaLOG) corrective regimen sliding scale   SubCutaneous three times a day before meals  levothyroxine 75 MICROGram(s) Oral daily  lisinopril 40 milliGRAM(s) Oral daily  mesalamine ER Capsule 500 milliGRAM(s) Oral two times a day  pantoprazole    Tablet 40 milliGRAM(s) Oral before breakfast  traMADol 50 milliGRAM(s) Oral every 6 hours PRN      FAMILY HISTORY:      SOCIAL HISTORY:  Smoking: [ ]Yes [ x]No  ETOH: [ ]Yes x[ ]No  Drug Use: [ ]Yes [x ]No   [x ] Single[ ]    T(F): 97.6 (08-12-20 @ 05:29), Max: 100.2 (08-11-20 @ 17:13)  HR: 91 (08-12-20 @ 05:29)  BP: 111/63 (08-12-20 @ 05:29)  RR: 18 (08-12-20 @ 05:29)  SpO2: 93% (08-12-20 @ 05:29)  Wt(kg): --    PHYSICAL EXAM:  General: alert, no acute distress  Eyes:  anicteric, left eye conjunctival injection, no discharge. left forehead to hair over lid and over cheek upper area reddish with some crusting vesicles.   Oropharynx: no lesions or injection 	  Neck: supple, without adenopathy  Lungs: clear to auscultation  Heart: regular rate and rhythm; no murmur, rubs or gallops  Abdomen: soft, nondistended, nontender, without mass or organomegaly  Skin: over left eye vesicular lesions. redness just in line with vesicles. one vesicular type lesion on her back , reports she has it for years.   Extremities: no clubbing, cyanosis, or edema  Neurologic: alert, oriented, moves all extremities    LAB RESULTS:                        12.2   9.84  )-----------( 348      ( 12 Aug 2020 07:19 )             38.4     08-12    133<L>  |  93<L>  |  14  ----------------------------<  155<H>  3.6   |  25  |  0.85    Ca    9.6      12 Aug 2020 07:19  Mg     1.6     08-12    TPro  6.8  /  Alb  3.8  /  TBili  0.4  /  DBili  x   /  AST  36  /  ALT  27  /  AlkPhos  54  08-12    LIVER FUNCTIONS - ( 12 Aug 2020 07:19 )  Alb: 3.8 g/dL / Pro: 6.8 g/dL / ALK PHOS: 54 U/L / ALT: 27 U/L / AST: 36 U/L / GGT: x               MICROBIOLOGY:  RECENT CULTURES:        RADIOLOGY REVIEWED:          Impression: Dermatomal V! zoster with conjunctivitis, trabeculitis seems to be crusting on acycloivr. I dont think the back lesion is vzv.  I dont think she has superimposed bacterial cellulitis of the face at present.     Recommendations:  continue iv acyclovir but adjusted for ibw  hydrate  drops per ophtho  anticipate po valtrex in near future

## 2020-08-13 LAB
ANION GAP SERPL CALC-SCNC: 13 MMOL/L — SIGNIFICANT CHANGE UP (ref 5–17)
ANION GAP SERPL CALC-SCNC: 15 MMOL/L — SIGNIFICANT CHANGE UP (ref 5–17)
BUN SERPL-MCNC: 34 MG/DL — HIGH (ref 7–23)
BUN SERPL-MCNC: 43 MG/DL — HIGH (ref 7–23)
CALCIUM SERPL-MCNC: 8.5 MG/DL — SIGNIFICANT CHANGE UP (ref 8.4–10.5)
CALCIUM SERPL-MCNC: 8.6 MG/DL — SIGNIFICANT CHANGE UP (ref 8.4–10.5)
CHLORIDE SERPL-SCNC: 90 MMOL/L — LOW (ref 96–108)
CHLORIDE SERPL-SCNC: 94 MMOL/L — LOW (ref 96–108)
CO2 SERPL-SCNC: 21 MMOL/L — LOW (ref 22–31)
CO2 SERPL-SCNC: 22 MMOL/L — SIGNIFICANT CHANGE UP (ref 22–31)
CREAT SERPL-MCNC: 4.32 MG/DL — HIGH (ref 0.5–1.3)
CREAT SERPL-MCNC: 5.24 MG/DL — HIGH (ref 0.5–1.3)
GLUCOSE BLDC GLUCOMTR-MCNC: 125 MG/DL — HIGH (ref 70–99)
GLUCOSE BLDC GLUCOMTR-MCNC: 149 MG/DL — HIGH (ref 70–99)
GLUCOSE BLDC GLUCOMTR-MCNC: 158 MG/DL — HIGH (ref 70–99)
GLUCOSE BLDC GLUCOMTR-MCNC: 225 MG/DL — HIGH (ref 70–99)
GLUCOSE SERPL-MCNC: 143 MG/DL — HIGH (ref 70–99)
GLUCOSE SERPL-MCNC: 176 MG/DL — HIGH (ref 70–99)
HCV AB S/CO SERPL IA: 0.11 S/CO — SIGNIFICANT CHANGE UP (ref 0–0.99)
HCV AB SERPL-IMP: SIGNIFICANT CHANGE UP
POTASSIUM SERPL-MCNC: 3.7 MMOL/L — SIGNIFICANT CHANGE UP (ref 3.5–5.3)
POTASSIUM SERPL-MCNC: 3.7 MMOL/L — SIGNIFICANT CHANGE UP (ref 3.5–5.3)
POTASSIUM SERPL-SCNC: 3.7 MMOL/L — SIGNIFICANT CHANGE UP (ref 3.5–5.3)
POTASSIUM SERPL-SCNC: 3.7 MMOL/L — SIGNIFICANT CHANGE UP (ref 3.5–5.3)
SARS-COV-2 IGG SERPL QL IA: NEGATIVE — SIGNIFICANT CHANGE UP
SARS-COV-2 IGM SERPL IA-ACNC: <0.2 RATIO — SIGNIFICANT CHANGE UP
SODIUM SERPL-SCNC: 126 MMOL/L — LOW (ref 135–145)
SODIUM SERPL-SCNC: 129 MMOL/L — LOW (ref 135–145)

## 2020-08-13 PROCEDURE — 99232 SBSQ HOSP IP/OBS MODERATE 35: CPT

## 2020-08-13 RX ORDER — BRIMONIDINE TARTRATE 2 MG/MG
1 SOLUTION/ DROPS OPHTHALMIC THREE TIMES A DAY
Refills: 0 | Status: DISCONTINUED | OUTPATIENT
Start: 2020-08-13 | End: 2020-08-20

## 2020-08-13 RX ORDER — HEPARIN SODIUM 5000 [USP'U]/ML
5000 INJECTION INTRAVENOUS; SUBCUTANEOUS EVERY 8 HOURS
Refills: 0 | Status: DISCONTINUED | OUTPATIENT
Start: 2020-08-13 | End: 2020-08-21

## 2020-08-13 RX ORDER — SODIUM CHLORIDE 9 MG/ML
1000 INJECTION INTRAMUSCULAR; INTRAVENOUS; SUBCUTANEOUS
Refills: 0 | Status: DISCONTINUED | OUTPATIENT
Start: 2020-08-13 | End: 2020-08-15

## 2020-08-13 RX ADMIN — Medication 145 MILLIGRAM(S): at 12:50

## 2020-08-13 RX ADMIN — GABAPENTIN 300 MILLIGRAM(S): 400 CAPSULE ORAL at 22:58

## 2020-08-13 RX ADMIN — Medication 500 MILLIGRAM(S): at 17:25

## 2020-08-13 RX ADMIN — Medication 1 APPLICATION(S): at 12:50

## 2020-08-13 RX ADMIN — INSULIN GLARGINE 30 UNIT(S): 100 INJECTION, SOLUTION SUBCUTANEOUS at 23:02

## 2020-08-13 RX ADMIN — HEPARIN SODIUM 5000 UNIT(S): 5000 INJECTION INTRAVENOUS; SUBCUTANEOUS at 13:38

## 2020-08-13 RX ADMIN — SODIUM CHLORIDE 100 MILLILITER(S): 9 INJECTION INTRAMUSCULAR; INTRAVENOUS; SUBCUTANEOUS at 22:57

## 2020-08-13 RX ADMIN — Medication 500 MILLIGRAM(S): at 05:15

## 2020-08-13 RX ADMIN — SODIUM CHLORIDE 100 MILLILITER(S): 9 INJECTION INTRAMUSCULAR; INTRAVENOUS; SUBCUTANEOUS at 17:27

## 2020-08-13 RX ADMIN — LISINOPRIL 40 MILLIGRAM(S): 2.5 TABLET ORAL at 05:15

## 2020-08-13 RX ADMIN — AMLODIPINE BESYLATE 5 MILLIGRAM(S): 2.5 TABLET ORAL at 05:15

## 2020-08-13 RX ADMIN — Medication 1: at 08:50

## 2020-08-13 RX ADMIN — DORZOLAMIDE HYDROCHLORIDE TIMOLOL MALEATE 1 DROP(S): 20; 5 SOLUTION/ DROPS OPHTHALMIC at 05:16

## 2020-08-13 RX ADMIN — Medication 1 APPLICATION(S): at 01:17

## 2020-08-13 RX ADMIN — HEPARIN SODIUM 5000 UNIT(S): 5000 INJECTION INTRAVENOUS; SUBCUTANEOUS at 22:58

## 2020-08-13 RX ADMIN — INSULIN GLARGINE 30 UNIT(S): 100 INJECTION, SOLUTION SUBCUTANEOUS at 09:02

## 2020-08-13 RX ADMIN — Medication 110 MILLIGRAM(S): at 01:12

## 2020-08-13 RX ADMIN — DORZOLAMIDE HYDROCHLORIDE TIMOLOL MALEATE 1 DROP(S): 20; 5 SOLUTION/ DROPS OPHTHALMIC at 17:24

## 2020-08-13 RX ADMIN — ATENOLOL 50 MILLIGRAM(S): 25 TABLET ORAL at 05:15

## 2020-08-13 RX ADMIN — Medication 1 APPLICATION(S): at 17:25

## 2020-08-13 RX ADMIN — PANTOPRAZOLE SODIUM 40 MILLIGRAM(S): 20 TABLET, DELAYED RELEASE ORAL at 05:15

## 2020-08-13 RX ADMIN — ATORVASTATIN CALCIUM 40 MILLIGRAM(S): 80 TABLET, FILM COATED ORAL at 22:58

## 2020-08-13 RX ADMIN — BRIMONIDINE TARTRATE 1 DROP(S): 2 SOLUTION/ DROPS OPHTHALMIC at 23:02

## 2020-08-13 RX ADMIN — Medication 1 APPLICATION(S): at 05:15

## 2020-08-13 RX ADMIN — Medication 1 APPLICATION(S): at 22:57

## 2020-08-13 RX ADMIN — Medication 2: at 12:49

## 2020-08-13 RX ADMIN — Medication 75 MICROGRAM(S): at 05:15

## 2020-08-13 NOTE — PROGRESS NOTE ADULT - SUBJECTIVE AND OBJECTIVE BOX
CC: f/u for  dermatomal zoster  Patient reports  she feels her eye is better, not urinating much  REVIEW OF SYSTEMS:  All other review of systems negative (Comprehensive ROS)    Antimicrobials Day #  :3  acyclovir on hold  Other Medications Reviewed    T(F): 97.9 (08-13-20 @ 04:04), Max: 98 (08-12-20 @ 21:35)  HR: 83 (08-13-20 @ 04:04)  BP: 113/71 (08-13-20 @ 04:04)  RR: 18 (08-13-20 @ 04:04)  SpO2: 94% (08-13-20 @ 04:04)  Wt(kg): --    PHYSICAL EXAM:  General: alert, no acute distress  Eyes:  anicteric, less left  conjunctival injection, no discharge  Oropharynx: no lesions or injection 	  Neck: supple, without adenopathy  Lungs: clear to auscultation  Heart: regular rate and rhythm; no murmur, rubs or gallops  Abdomen: soft, nondistended, nontender, without mass or organomegaly  Skin: left eye area vesicular rash is improved, redness improved  Extremities: no clubbing, cyanosis, or edema  Neurologic: alert, oriented, moves all extremities    LAB RESULTS:                        12.2   9.84  )-----------( 348      ( 12 Aug 2020 07:19 )             38.4     08-13    129<L>  |  94<L>  |  34<H>  ----------------------------<  176<H>  3.7   |  22  |  4.32<H>    Ca    8.6      13 Aug 2020 07:17  Mg     1.6     08-12    TPro  6.8  /  Alb  3.8  /  TBili  0.4  /  DBili  x   /  AST  36  /  ALT  27  /  AlkPhos  54  08-12    LIVER FUNCTIONS - ( 12 Aug 2020 07:19 )  Alb: 3.8 g/dL / Pro: 6.8 g/dL / ALK PHOS: 54 U/L / ALT: 27 U/L / AST: 36 U/L / GGT: x             MICROBIOLOGY:  RECENT CULTURES:      RADIOLOGY REVIEWED:              Assessment:  V1 zoster with trabeculitis, eye looked very bad yesterday and much better today after acyclovir for 2 days. She now has kim despite hydration and cutting the dose to ibw. She is much better wrt zoster so will hold systemic antiviral tx for today and then decide on restarting in am but likely just po since eye is so much better  Plan:  hydrate  renal  hold acyclovir , hold on po antiviral for now  topical tx per optho  no need for antibiotics

## 2020-08-13 NOTE — CONSULT NOTE ADULT - ASSESSMENT
63 yo woman with a pmhx of Insulin dependent DM, Hypothyroidism, HLD, HTN, neuropathy, Crohn's disease on immunosuppressants admitted for zoster opthalmicus found to have RODGER    Rodger   scr 0.8 on 8/12  Scr 4. 3 today  Rodger multifactorial -- sec to hyperglycmia, ACE, Ketorolac and Acyclovir  HODL lisinopril  Optimzie glucose control  AVOID all NSAID use  Acyclovir on hold,- -last dose on 8/12  Check UA , Urine Cr, Urine Na  Agree with NS at 100cc/hr -- MOnitor for fluid overlaod   PT reports no urine today --  Check Renal sonogram   Consent obtained for RRT if needed  MOnitor BMP   AVoid further nephrotoxics, NSAIDS RCA  * Discussed with patient and daughter at length    HTN  BP stable  Hold lisinopril  Monitor BP  Increase amlodipine to 10mg If needed  Low salt diet       Hyponatremia  etiology? hyperglycemia contributing  Check Urine Na, Urine Osm  Monitor at present

## 2020-08-13 NOTE — PHARMACOTHERAPY INTERVENTION NOTE - COMMENTS
Patient's SCr is 4.32 today (SCr noted to be 0.85 yesterday). Recommended to discontinue lisinopril and tramadol due to patient's worsening renal function. Additionally, recommended to change Lovenox to heparin, increase sodium chloride infusion rate to 100 ml/hr, and decrease or hold today's acyclovir dose.    Barbara Olivier, PharmD  PGY-1 Pharmacy Resident  [phone]

## 2020-08-13 NOTE — PROGRESS NOTE ADULT - ASSESSMENT
_________________________________________________________________________________________  ========>>  M E D I C A L   A T T E N D I N G    F O L L O W  U P  N O T E  <<=========  -----------------------------------------------------------------------------------------------------    - Patient seen and examined by me earlier today.   - In summary,  COLBY SAAB is a 64y year old woman who originally presented with facial pain   - Patient today overall doing ok, comfortable, eating OK. pain and swelling improved but still present .. pt with improved vision due to decreased swelling          pt noted with decreased urine output today with elevated creatinine     ==================>> REVIEW OF SYSTEM <<=================    GEN: no fever, no chills, pain as above   RESP: no SOB, no cough, no sputum  CVS: no chest pain, no palpitations  GI: no abdominal pain, no nausea, no constipation, no diarrhea  : no dysuria, no hematuria  Neuro: no headache, no dizziness  Derm : no itching, no rash    ==================>> PHYSICAL EXAM <<=================    GEN: A&O X 3 , NAD , comfortable  HEENT: PERRL, MMM, hearing intact, injected conjunctiva,  facial zoster starting to crust over on mid forehead are >> overall improved   Neck: supple , no JVD appreciated  CVS: S1S2 , regular , No M/R/G appreciated  PULM: CTA B/L,  no W/R/R appreciated  ABD.: soft. non tender, non distended,  bowel sounds present, obese   Extrem: intact pulses , no edema   PSYCH : normal mood,  not anxious      ==================>> MEDICATIONS <<====================    amLODIPine   Tablet 5 milliGRAM(s) Oral daily  ATENolol  Tablet 50 milliGRAM(s) Oral daily  atorvastatin 40 milliGRAM(s) Oral at bedtime  dextrose 5%. 1000 milliLiter(s) IV Continuous <Continuous>  dextrose 50% Injectable 12.5 Gram(s) IV Push once  dextrose 50% Injectable 25 Gram(s) IV Push once  dextrose 50% Injectable 25 Gram(s) IV Push once  dorzolamide 2%/timolol 0.5% Ophthalmic Solution 1 Drop(s) Left EYE two times a day  erythromycin   Ointment 1 Application(s) Left EYE four times a day  fenofibrate Tablet 145 milliGRAM(s) Oral daily  gabapentin 300 milliGRAM(s) Oral at bedtime  heparin   Injectable 5000 Unit(s) SubCutaneous every 8 hours  insulin glargine Injectable (LANTUS) 30 Unit(s) SubCutaneous every morning  insulin glargine Injectable (LANTUS) 30 Unit(s) SubCutaneous at bedtime  insulin lispro (HumaLOG) corrective regimen sliding scale   SubCutaneous three times a day before meals  levothyroxine 75 MICROGram(s) Oral daily  mesalamine ER Capsule 500 milliGRAM(s) Oral two times a day  pantoprazole    Tablet 40 milliGRAM(s) Oral before breakfast  sodium chloride 0.9%. 1000 milliLiter(s) IV Continuous <Continuous>    MEDICATIONS  (PRN):  acetaminophen   Tablet .. 650 milliGRAM(s) Oral every 4 hours PRN Mild Pain (1 - 3)  dextrose 40% Gel 15 Gram(s) Oral once PRN Blood Glucose LESS THAN 70 milliGRAM(s)/deciliter  glucagon  Injectable 1 milliGRAM(s) IntraMuscular once PRN Glucose LESS THAN 70 milligrams/deciliter    ___________  Active diet:  Diet, Regular:   Consistent Carbohydrate Evening Snack (CSTCHOSN)  Lacto-Ovo Veg (Accepts Milk Prod., Eggs)  ___________________    ==================>> VITAL SIGNS <<==================    Height (cm): 157.5  Weight (kg): 93.44  BMI (kg/m2): 37.7  Vital Signs Last 24 HrsT(C): 36.3 (08-13-20 @ 14:19)  T(F): 97.3 (08-13-20 @ 14:19), Max: 98 (08-12-20 @ 21:35)  HR: 85 (08-13-20 @ 14:19) (82 - 85)  BP: 122/75 (08-13-20 @ 14:19)  RR: 18 (08-13-20 @ 14:19) (18 - 18)  SpO2: 96% (08-13-20 @ 14:19) (93% - 96%)        POCT Blood Glucose.: 225 mg/dL (13 Aug 2020 12:46)  POCT Blood Glucose.: 158 mg/dL (13 Aug 2020 08:31)  POCT Blood Glucose.: 244 mg/dL (12 Aug 2020 21:27)  POCT Blood Glucose.: 135 mg/dL (12 Aug 2020 17:28)     ==================>> LAB AND IMAGING <<==================                        12.2   9.84  )-----------( 348      ( 12 Aug 2020 07:19 )             38.4        08-13    129<L>  |  94<L>  |  34<H>  ----------------------------<  176<H>  3.7   |  22  |  4.32<H>    Ca    8.6      13 Aug 2020 07:17  Mg     1.6     08-12    TPro  6.8  /  Alb  3.8  /  TBili  0.4  /  DBili  x   /  AST  36  /  ALT  27  /  AlkPhos  54  08-12    WBC count:   9.84 <<== ,  9.17 <<==   Hemoglobin:   12.2 <<==,  13.5 <<==  platelets:  348 <==, 389 <==    Creatinine:  4.32  <<==, 0.85  <<==, 0.72  <<==  Sodium:   129  <==, 133  <==, 134  <==       AST:          36 <== , 37 <==      ALT:        27  <== , 30  <==      AP:        54  <=, 62  <=     Bili:        0.4  <=, 0.4  <=    ___________________________________________________________________________________  ===============>>  A S S E S S M E N T   A N D   P L A N <<===============  ------------------------------------------------------------------------------------------    · Assessment		  63 yo woman with a pmhx of Insulin dependent DM, Hypothyroidism, HLD, HTN, neuropathy, Crohn's disease on immunosuppressants present to the ED with eye redness and pain, and rash that started about 5 days prior       Problem/Plan - :  ·  Problem: KAVIN  possibly due to Acyclovir ?   pt likely with baseline diabetic nephropathy  hold Acyclovir  AVOID all NSAID use  hold ACEI  Avoid nephrotoxic medications.   IVH started ( pt's IV infiltrated : to be changed as d/w RN  nephrology consulted and appreciated   monitor closely    Problem/Plan - 1:  ·  Problem: Zoster ophthalmicus, facial shingles, conjunctivitis..   overall improved  ophtho and ID following, appreciated  hold Acyclovir as above    ointment and drops as ordered   supportive care  pain mgmt.     Problem/Plan - 2:  ·  Problem: less likely Facial cellulitis  ID appreciated   monitor off systemic abx  erythromycin  Ointment as above     Problem/Plan - 3:  ·  Problem: Type 2 diabetes mellitus with other specified complication, with long-term current use of insulin.  Plan: meds reviewed with pt's son and reconciled   lantus BID dosing, 30 units  RISS  endo as needed  monitor FS and adjust meds as needed for better glycemic control   A1C 7.5  resume Ozempic on DC   Neurontin for diabetic neuropathy.     Problem/Plan - 4:  ·  Problem: Essential hypertension.  Plan: Continue Current medications and monitor.     Problem/Plan - 5:  ·  Problem: Hyperlipidemia, unspecified hyperlipidemia type.  Plan: Continue Current medications and monitor.     Problem/Plan - 6:  Problem: Class 3 severe obesity due to excess calories with serious comorbidity and body mass index (BMI) of 40.0 to 44.9 in adult. Plan: nutrition consult  - I had a long dissuasion with patient about life style changes, proper diet, exercise, weight loss, medication compliance, and close follow up and compliance with doctors.      Patient understood, all questions answered.     Problem/Plan - 7:  ·  Problem: Crohn's disease with complication, unspecified gastrointestinal tract location.  Plan: meds reviewed and reconciled with son  Balsalazide not formulary > changed to Mesalamine   monitor.     Problem/Plan - 8:  ·  Problem: Neuropathy due to type 2 diabetes mellitus.  Plan: Continue home medications and monitor.     Problem/Plan - 9:  ·  Problem: Need for prophylactic measure.  Plan: Lovenox  Protonix.     --------------------------------------------  Case discussed with pt, NP, ID, renal...   Education given on findings and plan of care  ___________________________  H. JOÃO Forman.  Pager: [phone]

## 2020-08-13 NOTE — PROGRESS NOTE ADULT - SUBJECTIVE AND OBJECTIVE BOX
[facility] DEPARTMENT OF OPHTHALMOLOGY  ------------------------------------------------------------------------------  Quyen Florentino MD PGY-III  Pager: [phone]/[facility]: [phone]  ------------------------------------------------------------------------------    Interval History: Patient reporting feeling better but still with lid lesions.     MEDICATIONS  (STANDING):  amLODIPine   Tablet 5 milliGRAM(s) Oral daily  ATENolol  Tablet 50 milliGRAM(s) Oral daily  atorvastatin 40 milliGRAM(s) Oral at bedtime  dextrose 5%. 1000 milliLiter(s) (50 mL/Hr) IV Continuous <Continuous>  dextrose 50% Injectable 12.5 Gram(s) IV Push once  dextrose 50% Injectable 25 Gram(s) IV Push once  dextrose 50% Injectable 25 Gram(s) IV Push once  dorzolamide 2%/timolol 0.5% Ophthalmic Solution 1 Drop(s) Left EYE two times a day  erythromycin   Ointment 1 Application(s) Left EYE four times a day  fenofibrate Tablet 145 milliGRAM(s) Oral daily  gabapentin 300 milliGRAM(s) Oral at bedtime  heparin   Injectable 5000 Unit(s) SubCutaneous every 8 hours  insulin glargine Injectable (LANTUS) 30 Unit(s) SubCutaneous every morning  insulin glargine Injectable (LANTUS) 30 Unit(s) SubCutaneous at bedtime  insulin lispro (HumaLOG) corrective regimen sliding scale   SubCutaneous three times a day before meals  levothyroxine 75 MICROGram(s) Oral daily  mesalamine ER Capsule 500 milliGRAM(s) Oral two times a day  pantoprazole    Tablet 40 milliGRAM(s) Oral before breakfast  sodium chloride 0.9%. 1000 milliLiter(s) (100 mL/Hr) IV Continuous <Continuous>    MEDICATIONS  (PRN):  acetaminophen   Tablet .. 650 milliGRAM(s) Oral every 4 hours PRN Mild Pain (1 - 3)  dextrose 40% Gel 15 Gram(s) Oral once PRN Blood Glucose LESS THAN 70 milliGRAM(s)/deciliter  glucagon  Injectable 1 milliGRAM(s) IntraMuscular once PRN Glucose LESS THAN 70 milligrams/deciliter      VITALS: T(C): 36.3 (08-13-20 @ 14:19)  T(F): 97.3 (08-13-20 @ 14:19), Max: 98 (08-12-20 @ 21:35)  HR: 85 (08-13-20 @ 14:19) (82 - 85)  BP: 122/75 (08-13-20 @ 14:19) (113/71 - 122/75)  RR:  (18 - 18)  SpO2:  (93% - 96%)  Wt(kg): --  General: AAO x 3, appropriate mood and affect    Ophthalmology Exam:  Visual acuity (sc): 20/20 OD 20/25 OS  Pupils: PERRL OU, no APD  Ttono: 19 OD 27 OS (patient squeezing OU)  Extraocular movements (EOMs): Full OU, no pain, no diplopia  Confrontational Visual Field (CVF): Full OU    Pen Light Exam (PLE)  External: Flat OD weeping vesicles and crusting over lesions OS  Lids/Lashes/Lacrimal Ducts: Flat OD weeping vesicles over left upper lid OS  Sclera/Conjunctiva: W+Q OD 1+ injection OS  Cornea: Cl OD punctate staining inferiorly at 5-7 o clock and 1 o clock  Anterior Chamber: D+F OU    Iris: Flat OU  Lens: Cl OU [facility] DEPARTMENT OF OPHTHALMOLOGY  ------------------------------------------------------------------------------  Quyen Florentino MD PGY-III  Pager: [phone]/[facility]: [phone]  ------------------------------------------------------------------------------    Interval History: Patient reporting feeling better but still with lid lesions. No pain, no new eye complaints.    MEDICATIONS  (STANDING):  amLODIPine   Tablet 5 milliGRAM(s) Oral daily  ATENolol  Tablet 50 milliGRAM(s) Oral daily  atorvastatin 40 milliGRAM(s) Oral at bedtime  dextrose 5%. 1000 milliLiter(s) (50 mL/Hr) IV Continuous <Continuous>  dextrose 50% Injectable 12.5 Gram(s) IV Push once  dextrose 50% Injectable 25 Gram(s) IV Push once  dextrose 50% Injectable 25 Gram(s) IV Push once  dorzolamide 2%/timolol 0.5% Ophthalmic Solution 1 Drop(s) Left EYE two times a day  erythromycin   Ointment 1 Application(s) Left EYE four times a day  fenofibrate Tablet 145 milliGRAM(s) Oral daily  gabapentin 300 milliGRAM(s) Oral at bedtime  heparin   Injectable 5000 Unit(s) SubCutaneous every 8 hours  insulin glargine Injectable (LANTUS) 30 Unit(s) SubCutaneous every morning  insulin glargine Injectable (LANTUS) 30 Unit(s) SubCutaneous at bedtime  insulin lispro (HumaLOG) corrective regimen sliding scale   SubCutaneous three times a day before meals  levothyroxine 75 MICROGram(s) Oral daily  mesalamine ER Capsule 500 milliGRAM(s) Oral two times a day  pantoprazole    Tablet 40 milliGRAM(s) Oral before breakfast  sodium chloride 0.9%. 1000 milliLiter(s) (100 mL/Hr) IV Continuous <Continuous>    MEDICATIONS  (PRN):  acetaminophen   Tablet .. 650 milliGRAM(s) Oral every 4 hours PRN Mild Pain (1 - 3)  dextrose 40% Gel 15 Gram(s) Oral once PRN Blood Glucose LESS THAN 70 milliGRAM(s)/deciliter  glucagon  Injectable 1 milliGRAM(s) IntraMuscular once PRN Glucose LESS THAN 70 milligrams/deciliter      VITALS: T(C): 36.3 (08-13-20 @ 14:19)  T(F): 97.3 (08-13-20 @ 14:19), Max: 98 (08-12-20 @ 21:35)  HR: 85 (08-13-20 @ 14:19) (82 - 85)  BP: 122/75 (08-13-20 @ 14:19) (113/71 - 122/75)  RR:  (18 - 18)  SpO2:  (93% - 96%)  Wt(kg): --  General: AAO x 3, appropriate mood and affect    Ophthalmology Exam:  Visual acuity (sc): 20/20 OD 20/25 OS  Pupils: PERRL OU, no APD  Ttono: 19 OD 27 OS (patient squeezing OU)  Extraocular movements (EOMs): Full OU, no pain, no diplopia  Confrontational Visual Field (CVF): Full OU    Pen Light Exam (PLE)  External: Flat OD weeping vesicles and crusting over lesions OS  Lids/Lashes/Lacrimal Ducts: Flat OD weeping vesicles over left upper lid OS  Sclera/Conjunctiva: W+Q OD 1+ injection OS  Cornea: Cl OD punctate staining inferiorly at 5-7 o clock and 1 o clock  Anterior Chamber: D+F OU    Iris: Flat OU  Lens: Cl OU

## 2020-08-13 NOTE — PROGRESS NOTE ADULT - ASSESSMENT
Assessment and Recommendations:  64y female w/ pmhx/ochx of Crohns on immunosuppresion, DM, HTN, HLD consulted for zoster of V1 distribution found to have early signs of zoster keratitis, in addition to conjunctivitis and slightly elevated pressure suggestive of a trabeculitis. Would recommend treatment as below for zoster ophthalmicus of the left eye.  No evidence of uveitis at this time, so will hold off on steroids, however may add if IOP not controlled and corneal findings improve.    1. Herpes zoster ophthalmicus, Left eye  - continue with systemic antiviral as per primary team / ID    - c/w erythromycin ophthalmic ointment QID to the left eye, into the eye and onto the eyelid lesions around the eye  - c/w dorzolomide/timolol (COSOPT) eyedrops BID to the left eye  - add brimonidine eyedrops TID to the left eye  - findings and plan discussed with patient   - will follow    Patient was seen and discussed with Dr. Davenport.    Outpatient follow-up: Patient should follow-up with his/her ophthalmologist or with [facility] Department of Ophthalmology within 1 week of after discharge, sooner if symptoms worsen or change at:    [address]  [place] [address]  [phone]    Quyen Florentino MD, PGY-III  Pager: [phone]/[facility]: [phone]  Also available on Microsoft Teams Assessment and Recommendations:  64y female w/ pmhx/ochx of Crohns on immunosuppresion, DM, HTN, HLD consulted for zoster of V1 distribution found to have early signs of zoster keratitis, in addition to conjunctivitis and slightly elevated pressure suggestive of a trabeculitis. Would recommend treatment as below for zoster ophthalmicus of the left eye.  No evidence of uveitis at this time, so will hold off on steroids, however may add if IOP not controlled and corneal findings improve.    1. Herpes zoster ophthalmicus, Left eye  - continue with systemic antiviral as per primary team / ID    - c/w erythromycin ophthalmic ointment QID to the left eye, into the eye and onto the eyelid lesions around the eye  - c/w dorzolomide/timolol (COSOPT) eyedrops BID to the left eye  - add brimonidine eyedrops TID to the left eye  -discussed with patient   - will follow    Patient was seen and discussed with  - findings and plan discussed with patient and primary team  - pt seen and discussed with Dr. Davenport    Outpatient follow-up: Patient should follow-up with his/her ophthalmologist or with [facility] Department of Ophthalmology within 1 week of after discharge, sooner if symptoms worsen or change at:    [address]  [place] [address]  [phone]    Quyen Florentino MD, PGY-III  Pager: [phone]/[facility]: [phone]  Also available on [employer] Teams

## 2020-08-13 NOTE — CONSULT NOTE ADULT - SUBJECTIVE AND OBJECTIVE BOX
[facility]   MD MARIELA SLOAN MD RUORU WONG, PA    TEL:  OFFICE: [phone]  DR WINSLOW CELL: [phone]  JAYME BEAVERS CELL: [phone]  DR. THAPA CELL: [phone]  DR. JIMENEZ CELl: [phone]    FROM 5 PM- 7 AM PLEASE CALL ANSWERING SERVICE AT [phone]    -- INITIAL RENAL CONSULT NOTE  --------------------------------------------------------------------------------  HPI:  63 yo woman with a pmhx of Insulin dependent DM, Hypothyroidism, HLD, HTN, neuropathy, Crohn's disease on immunosuppressants admitted for zoster opthalmicus found to have KAVIN  Pt denies priior history of kidney disease.     PAST HISTORY  --------------------------------------------------------------------------------  PAST MEDICAL & SURGICAL HISTORY:    FAMILY HISTORY:    PAST SOCIAL HISTORY:    ALLERGIES & MEDICATIONS  --------------------------------------------------------------------------------  Allergies    No Known Allergies    Intolerances      Standing Inpatient Medications  amLODIPine   Tablet 5 milliGRAM(s) Oral daily  ATENolol  Tablet 50 milliGRAM(s) Oral daily  atorvastatin 40 milliGRAM(s) Oral at bedtime  dextrose 5%. 1000 milliLiter(s) IV Continuous <Continuous>  dextrose 50% Injectable 12.5 Gram(s) IV Push once  dextrose 50% Injectable 25 Gram(s) IV Push once  dextrose 50% Injectable 25 Gram(s) IV Push once  dorzolamide 2%/timolol 0.5% Ophthalmic Solution 1 Drop(s) Left EYE two times a day  erythromycin   Ointment 1 Application(s) Left EYE four times a day  fenofibrate Tablet 145 milliGRAM(s) Oral daily  gabapentin 300 milliGRAM(s) Oral at bedtime  heparin   Injectable 5000 Unit(s) SubCutaneous every 8 hours  insulin glargine Injectable (LANTUS) 30 Unit(s) SubCutaneous every morning  insulin glargine Injectable (LANTUS) 30 Unit(s) SubCutaneous at bedtime  insulin lispro (HumaLOG) corrective regimen sliding scale   SubCutaneous three times a day before meals  levothyroxine 75 MICROGram(s) Oral daily  lisinopril 40 milliGRAM(s) Oral daily  mesalamine ER Capsule 500 milliGRAM(s) Oral two times a day  pantoprazole    Tablet 40 milliGRAM(s) Oral before breakfast  sodium chloride 0.9%. 1000 milliLiter(s) IV Continuous <Continuous>    PRN Inpatient Medications  acetaminophen   Tablet .. 650 milliGRAM(s) Oral every 4 hours PRN  dextrose 40% Gel 15 Gram(s) Oral once PRN  glucagon  Injectable 1 milliGRAM(s) IntraMuscular once PRN      REVIEW OF SYSTEMS  --------------------------------------------------------------------------------  Gen: No fevers/chills  Skin: + rashes  Head/Eyes/Ears: Normal hearing,  Normal vision   Respiratory: No dyspnea, cough  CV: No chest pain  GI: No abdominal pain,   : decreased urine oupt   MSK: No  edema  Heme: No easy bruising or bleeding  Psych: No significant depression    All other systems were reviewed and are negative, except as noted.    VITALS/PHYSICAL EXAM  --------------------------------------------------------------------------------  T(C): 36.6 (08-13-20 @ 04:04), Max: 36.7 (08-12-20 @ 21:35)  HR: 83 (08-13-20 @ 04:04) (82 - 83)  BP: 113/71 (08-13-20 @ 04:04) (113/71 - 122/71)  RR: 18 (08-13-20 @ 04:04) (18 - 18)  SpO2: 94% (08-13-20 @ 04:04) (93% - 94%)  Wt(kg): --  Height (cm): 157.5 (08-11-20 @ 22:59)  Weight (kg): 93.44 (08-11-20 @ 22:59)  BMI (kg/m2): 37.7 (08-11-20 @ 22:59)  BSA (m2): 1.94 (08-11-20 @ 22:59)      08-12-20 @ 07:01  -  08-13-20 @ 07:00  --------------------------------------------------------  IN: 1740 mL / OUT: 0 mL / NET: 1740 mL      Physical Exam:  	Gen: NAD  	HEENT: MMM  	Pulm: CTA B/L  	CV: S1S2  	Abd: Soft, +BS   	Ext: No LE edema B/L  	Neuro: Awake, alert  	Skin: rash on face  	Vascular access: no hd catheter          : no  andrei  LABS/STUDIES  --------------------------------------------------------------------------------              12.2   9.84  >-----------<  348      [08-12-20 @ 07:19]              38.4     129  |  94  |  34  ----------------------------<  176      [08-13-20 @ 07:17]  3.7   |  22  |  4.32        Ca     8.6     [08-13-20 @ 07:17]      Mg     1.6     [08-12-20 @ 07:19]    TPro  6.8  /  Alb  3.8  /  TBili  0.4  /  DBili  x   /  AST  36  /  ALT  27  /  AlkPhos  54  [08-12-20 @ 07:19]          Creatinine Trend:  SCr 4.32 [08-13 @ 07:17]  SCr 0.85 [08-12 @ 07:19]  SCr 0.72 [08-11 @ 12:14]        TSH 2.58      [08-12-20 @ 12:03]  Lipid: chol 149, , HDL 38, LDL 77      [08-12-20 @ 12:15]    HCV 0.11, Nonreact      [08-13-20 @ 09:04]

## 2020-08-14 LAB
ANION GAP SERPL CALC-SCNC: 16 MMOL/L — SIGNIFICANT CHANGE UP (ref 5–17)
APPEARANCE UR: CLEAR — SIGNIFICANT CHANGE UP
BACTERIA # UR AUTO: NEGATIVE — SIGNIFICANT CHANGE UP
BILIRUB UR-MCNC: NEGATIVE — SIGNIFICANT CHANGE UP
BUN SERPL-MCNC: 46 MG/DL — HIGH (ref 7–23)
CALCIUM SERPL-MCNC: 8.4 MG/DL — SIGNIFICANT CHANGE UP (ref 8.4–10.5)
CHLORIDE SERPL-SCNC: 95 MMOL/L — LOW (ref 96–108)
CHLORIDE UR-SCNC: <35 MMOL/L — SIGNIFICANT CHANGE UP
CO2 SERPL-SCNC: 16 MMOL/L — LOW (ref 22–31)
COLOR SPEC: SIGNIFICANT CHANGE UP
CREAT ?TM UR-MCNC: 37 MG/DL — SIGNIFICANT CHANGE UP
CREAT SERPL-MCNC: 6.35 MG/DL — HIGH (ref 0.5–1.3)
DIFF PNL FLD: NEGATIVE — SIGNIFICANT CHANGE UP
EPI CELLS # UR: 1 /HPF — SIGNIFICANT CHANGE UP (ref 0–5)
GLUCOSE BLDC GLUCOMTR-MCNC: 100 MG/DL — HIGH (ref 70–99)
GLUCOSE BLDC GLUCOMTR-MCNC: 120 MG/DL — HIGH (ref 70–99)
GLUCOSE BLDC GLUCOMTR-MCNC: 125 MG/DL — HIGH (ref 70–99)
GLUCOSE BLDC GLUCOMTR-MCNC: 136 MG/DL — HIGH (ref 70–99)
GLUCOSE BLDC GLUCOMTR-MCNC: 185 MG/DL — HIGH (ref 70–99)
GLUCOSE BLDC GLUCOMTR-MCNC: 69 MG/DL — LOW (ref 70–99)
GLUCOSE BLDC GLUCOMTR-MCNC: 69 MG/DL — LOW (ref 70–99)
GLUCOSE SERPL-MCNC: 73 MG/DL — SIGNIFICANT CHANGE UP (ref 70–99)
GLUCOSE UR QL: NEGATIVE — SIGNIFICANT CHANGE UP
HYALINE CASTS # UR AUTO: 0 /LPF — SIGNIFICANT CHANGE UP (ref 0–7)
KETONES UR-MCNC: NEGATIVE — SIGNIFICANT CHANGE UP
LEUKOCYTE ESTERASE UR-ACNC: NEGATIVE — SIGNIFICANT CHANGE UP
NITRITE UR-MCNC: NEGATIVE — SIGNIFICANT CHANGE UP
OSMOLALITY UR: 147 MOSM/KG — SIGNIFICANT CHANGE UP (ref 50–1200)
PH UR: 6.5 — SIGNIFICANT CHANGE UP (ref 5–8)
POTASSIUM SERPL-MCNC: 3.9 MMOL/L — SIGNIFICANT CHANGE UP (ref 3.5–5.3)
POTASSIUM SERPL-SCNC: 3.9 MMOL/L — SIGNIFICANT CHANGE UP (ref 3.5–5.3)
PROT UR-MCNC: ABNORMAL
RBC CASTS # UR COMP ASSIST: 11 /HPF — HIGH (ref 0–4)
SODIUM SERPL-SCNC: 127 MMOL/L — LOW (ref 135–145)
SODIUM UR-SCNC: <35 MMOL/L — SIGNIFICANT CHANGE UP
SP GR SPEC: 1.01 — LOW (ref 1.01–1.02)
UROBILINOGEN FLD QL: SIGNIFICANT CHANGE UP
WBC UR QL: 1 /HPF — SIGNIFICANT CHANGE UP (ref 0–5)

## 2020-08-14 PROCEDURE — 76770 US EXAM ABDO BACK WALL COMP: CPT | Mod: 26

## 2020-08-14 RX ORDER — VALACYCLOVIR 500 MG/1
500 TABLET, FILM COATED ORAL EVERY 24 HOURS
Refills: 0 | Status: DISCONTINUED | OUTPATIENT
Start: 2020-08-14 | End: 2020-08-20

## 2020-08-14 RX ORDER — INSULIN GLARGINE 100 [IU]/ML
15 INJECTION, SOLUTION SUBCUTANEOUS EVERY MORNING
Refills: 0 | Status: DISCONTINUED | OUTPATIENT
Start: 2020-08-14 | End: 2020-08-15

## 2020-08-14 RX ORDER — LANOLIN ALCOHOL/MO/W.PET/CERES
3 CREAM (GRAM) TOPICAL AT BEDTIME
Refills: 0 | Status: DISCONTINUED | OUTPATIENT
Start: 2020-08-14 | End: 2020-08-21

## 2020-08-14 RX ORDER — INSULIN GLARGINE 100 [IU]/ML
15 INJECTION, SOLUTION SUBCUTANEOUS AT BEDTIME
Refills: 0 | Status: DISCONTINUED | OUTPATIENT
Start: 2020-08-14 | End: 2020-08-15

## 2020-08-14 RX ADMIN — Medication 3 MILLIGRAM(S): at 22:40

## 2020-08-14 RX ADMIN — Medication 145 MILLIGRAM(S): at 12:41

## 2020-08-14 RX ADMIN — ATORVASTATIN CALCIUM 40 MILLIGRAM(S): 80 TABLET, FILM COATED ORAL at 22:40

## 2020-08-14 RX ADMIN — DORZOLAMIDE HYDROCHLORIDE TIMOLOL MALEATE 1 DROP(S): 20; 5 SOLUTION/ DROPS OPHTHALMIC at 05:57

## 2020-08-14 RX ADMIN — Medication 75 MICROGRAM(S): at 05:55

## 2020-08-14 RX ADMIN — BRIMONIDINE TARTRATE 1 DROP(S): 2 SOLUTION/ DROPS OPHTHALMIC at 07:45

## 2020-08-14 RX ADMIN — SODIUM CHLORIDE 100 MILLILITER(S): 9 INJECTION INTRAMUSCULAR; INTRAVENOUS; SUBCUTANEOUS at 22:39

## 2020-08-14 RX ADMIN — Medication 650 MILLIGRAM(S): at 23:30

## 2020-08-14 RX ADMIN — BRIMONIDINE TARTRATE 1 DROP(S): 2 SOLUTION/ DROPS OPHTHALMIC at 12:41

## 2020-08-14 RX ADMIN — AMLODIPINE BESYLATE 5 MILLIGRAM(S): 2.5 TABLET ORAL at 05:56

## 2020-08-14 RX ADMIN — DORZOLAMIDE HYDROCHLORIDE TIMOLOL MALEATE 1 DROP(S): 20; 5 SOLUTION/ DROPS OPHTHALMIC at 18:20

## 2020-08-14 RX ADMIN — VALACYCLOVIR 500 MILLIGRAM(S): 500 TABLET, FILM COATED ORAL at 22:56

## 2020-08-14 RX ADMIN — GABAPENTIN 300 MILLIGRAM(S): 400 CAPSULE ORAL at 22:40

## 2020-08-14 RX ADMIN — Medication 1 APPLICATION(S): at 05:55

## 2020-08-14 RX ADMIN — Medication 650 MILLIGRAM(S): at 22:40

## 2020-08-14 RX ADMIN — HEPARIN SODIUM 5000 UNIT(S): 5000 INJECTION INTRAVENOUS; SUBCUTANEOUS at 22:40

## 2020-08-14 RX ADMIN — ATENOLOL 50 MILLIGRAM(S): 25 TABLET ORAL at 05:56

## 2020-08-14 RX ADMIN — Medication 1: at 12:40

## 2020-08-14 RX ADMIN — Medication 1 APPLICATION(S): at 22:44

## 2020-08-14 RX ADMIN — INSULIN GLARGINE 15 UNIT(S): 100 INJECTION, SOLUTION SUBCUTANEOUS at 22:41

## 2020-08-14 RX ADMIN — PANTOPRAZOLE SODIUM 40 MILLIGRAM(S): 20 TABLET, DELAYED RELEASE ORAL at 05:55

## 2020-08-14 RX ADMIN — Medication 650 MILLIGRAM(S): at 07:20

## 2020-08-14 RX ADMIN — Medication 500 MILLIGRAM(S): at 05:55

## 2020-08-14 RX ADMIN — Medication 1 APPLICATION(S): at 18:20

## 2020-08-14 RX ADMIN — BRIMONIDINE TARTRATE 1 DROP(S): 2 SOLUTION/ DROPS OPHTHALMIC at 22:41

## 2020-08-14 RX ADMIN — INSULIN GLARGINE 30 UNIT(S): 100 INJECTION, SOLUTION SUBCUTANEOUS at 09:08

## 2020-08-14 RX ADMIN — HEPARIN SODIUM 5000 UNIT(S): 5000 INJECTION INTRAVENOUS; SUBCUTANEOUS at 05:55

## 2020-08-14 RX ADMIN — HEPARIN SODIUM 5000 UNIT(S): 5000 INJECTION INTRAVENOUS; SUBCUTANEOUS at 12:40

## 2020-08-14 RX ADMIN — Medication 1 APPLICATION(S): at 12:41

## 2020-08-14 NOTE — PHARMACOTHERAPY INTERVENTION NOTE - COMMENTS
Patient in KAVIN, with current SCr of 6.35 (Scr on admission was 0.85). Recommended a 50% decrease in patient's current Lantus regimen, as BG reading have been trending downwards given worsening renal dysfunction.    Barbara Olivier, PharmD  PGY-1 Pharmacy Resident  [phone]

## 2020-08-14 NOTE — PROGRESS NOTE ADULT - ASSESSMENT
65 yo woman with a pmhx of Insulin dependent DM, Hypothyroidism, HLD, HTN, neuropathy, Crohn's disease on immunosuppressants admitted for zoster opthalmicus found to have RODGER    Rodger   scr 0.8 on 8/12  Scr continues to worsen today--   Rodger multifactorial -- sec to hyperglycmia, ACE, Ketorolac, Acyclovir and rentention   s/p bladder scan with 700cc urine -- recommend to insert forbes  HODL lisinopril (last dose on 8/13)  Optimzie glucose control  AVOID all NSAID use ( s/p ketorolac on 8/11)  Acyclovir on hold,- -last dose on 8/12  Check UA , Urine Cr, Urine Na- -discussed with RN and NP to send asap  continue NS at 100cc/hr -- MOnitor for fluid overload   PT reports decreased urine oupt   Check Renal sonogram  with dopplers   Consent obtained for RRT if needed  MOnitor BMP   AVoid further nephrotoxics, NSAIDS RCA  * Discussed with patient and daughter at length    HTN  BP stable  Hold lisinopril  Monitor BP  Increase amlodipine to 10mg If needed  Low salt diet       Hyponatremia  etiology? hyperglycemia contributing  Check Urine Na, Urine Osm- discussed with RN to send   Monitor at present

## 2020-08-14 NOTE — PROGRESS NOTE ADULT - ATTENDING COMMENTS
I have not examined patient but agree with note based on resident findings above. continue with above treatment.
I have interviewed and examined the patient and reviewed the residents note including the history, exam, assessment, and plan.  I agree with the residents assessment and plan.    Zoster ophthalmicus OS with dermatitis, conjunctivitis, keratitis and elevated IOP, no evidence of uveitis on exam  Continue systemic anti-viral per primary team  Continue erythromycin QID to lesions around OS and into OS  Continue cosopt BID and add brimondine TID OS  Will recheck IOP tomorrow, sooner if symptoms worsen or change    Davida Davenport MD

## 2020-08-14 NOTE — PROGRESS NOTE ADULT - ASSESSMENT
Assessment and Recommendations:  64y female w/ pmhx/ochx of Crohns on immunosuppresion, DM, HTN, HLD consulted for zoster of V1 distribution found to have early signs of zoster keratitis, in addition to conjunctivitis and slightly elevated pressure suggestive of a trabeculitis. C/w recommend treatment as below for zoster ophthalmicus of the left eye.  No evidence of uveitis at this time, so will hold off on steroids, however may add if IOP not controlled and corneal findings improve.    1. Herpes zoster ophthalmicus, Left eye  - continue with systemic antiviral as per primary team / ID    - c/w erythromycin ophthalmic ointment QID to the left eye, into the eye and onto the eyelid lesions around the eye  - c/w dorzolomide/timolol (COSOPT) eyedrops BID to the left eye  - c/w brimonidine eyedrops TID to the left eye    Outpatient follow-up: Patient should follow-up with his/her ophthalmologist or with [facility] Department of Ophthalmology within 1 week of after discharge, sooner if symptoms worsen or change at:    [address]  [place] [address]  [phone]    Quyen Florentino MD, PGY-III  Pager: [phone]/[facility]: [phone]  Also available on [employer] Teams

## 2020-08-14 NOTE — PROGRESS NOTE ADULT - SUBJECTIVE AND OBJECTIVE BOX
[facility]   MD MARIELA SLOAN MD RUORU WONG, PA    TEL:  OFFICE: [phone]  DR WINSLOW CELL: [phone]  JAYME BEAVERS CELL: [phone]  DR. THAPA CELL: [phone]  DR. JIMENEZ CELL: [phone]    FROM 5 PM - 7 AM PLEASE CALL ANSWERING SERVICE: [phone]    RENAL FOLLOW UP NOTE  --------------------------------------------------------------------------------  HPI:      Pt seen and examined at bedside.   Denies SOB, chest pain     PAST HISTORY  --------------------------------------------------------------------------------  No significant changes to PMH, PSH, FHx, SHx, unless otherwise noted    ALLERGIES & MEDICATIONS  --------------------------------------------------------------------------------  Allergies    No Known Allergies    Intolerances      Standing Inpatient Medications  amLODIPine   Tablet 5 milliGRAM(s) Oral daily  ATENolol  Tablet 50 milliGRAM(s) Oral daily  atorvastatin 40 milliGRAM(s) Oral at bedtime  brimonidine 0.2% Ophthalmic Solution 1 Drop(s) Left EYE three times a day  dextrose 5%. 1000 milliLiter(s) IV Continuous <Continuous>  dextrose 50% Injectable 12.5 Gram(s) IV Push once  dextrose 50% Injectable 25 Gram(s) IV Push once  dextrose 50% Injectable 25 Gram(s) IV Push once  dorzolamide 2%/timolol 0.5% Ophthalmic Solution 1 Drop(s) Left EYE two times a day  erythromycin   Ointment 1 Application(s) Left EYE four times a day  fenofibrate Tablet 145 milliGRAM(s) Oral daily  gabapentin 300 milliGRAM(s) Oral at bedtime  heparin   Injectable 5000 Unit(s) SubCutaneous every 8 hours  insulin glargine Injectable (LANTUS) 30 Unit(s) SubCutaneous every morning  insulin glargine Injectable (LANTUS) 30 Unit(s) SubCutaneous at bedtime  insulin lispro (HumaLOG) corrective regimen sliding scale   SubCutaneous three times a day before meals  levothyroxine 75 MICROGram(s) Oral daily  mesalamine ER Capsule 500 milliGRAM(s) Oral two times a day  pantoprazole    Tablet 40 milliGRAM(s) Oral before breakfast  sodium chloride 0.9%. 1000 milliLiter(s) IV Continuous <Continuous>    PRN Inpatient Medications  acetaminophen   Tablet .. 650 milliGRAM(s) Oral every 4 hours PRN  dextrose 40% Gel 15 Gram(s) Oral once PRN  glucagon  Injectable 1 milliGRAM(s) IntraMuscular once PRN      REVIEW OF SYSTEMS  --------------------------------------------------------------------------------  General: no fever  CVS: no chest pain  RESP: no sob, no cough  ABD: no abdominal pain  : decreased urination   MSK: no edema     VITALS/PHYSICAL EXAM  --------------------------------------------------------------------------------  T(C): 36.5 (08-14-20 @ 04:46), Max: 36.9 (08-13-20 @ 21:28)  HR: 78 (08-14-20 @ 04:46) (78 - 86)  BP: 145/92 (08-14-20 @ 04:46) (122/75 - 145/92)  RR: 18 (08-14-20 @ 04:46) (18 - 18)  SpO2: 93% (08-14-20 @ 04:46) (93% - 96%)  Wt(kg): --        08-13-20 @ 07:01  -  08-14-20 @ 07:00  --------------------------------------------------------  IN: 1650 mL / OUT: 1000 mL / NET: 650 mL      Physical Exam:  	Gen: NAD  	HEENT: MMM  	Pulm: CTA B/L  	CV: S1S2  	Abd: Soft, +BS, + obese  	Ext: No LE edema B/L                      Neuro: Awake alert  	Skin: Warm and Dry , facial rash  	Vascular access: no hd catheter           no  forbes  LABS/STUDIES  --------------------------------------------------------------------------------    126  |  90  |  43  ----------------------------<  143      [08-13-20 @ 17:41]  3.7   |  21  |  5.24        Ca     8.5     [08-13-20 @ 17:41]            Creatinine Trend:  SCr 5.24 [08-13 @ 17:41]  SCr 4.32 [08-13 @ 07:17]  SCr 0.85 [08-12 @ 07:19]  SCr 0.72 [08-11 @ 12:14]        TSH 2.58      [08-12-20 @ 12:03]  Lipid: chol 149, , HDL 38, LDL 77      [08-12-20 @ 12:15]    HCV 0.11, Nonreact      [08-13-20 @ 09:04]

## 2020-08-14 NOTE — PROGRESS NOTE ADULT - SUBJECTIVE AND OBJECTIVE BOX
[facility] DEPARTMENT OF OPHTHALMOLOGY  ------------------------------------------------------------------------------  Quyen Florentino MD PGY-III  Pager: [phone]/[facility]: [phone]  ------------------------------------------------------------------------------    Interval History: Family member stating patient seems a little more confused today but she cooperates well with eye exam.    MEDICATIONS  (STANDING):  amLODIPine   Tablet 5 milliGRAM(s) Oral daily  ATENolol  Tablet 50 milliGRAM(s) Oral daily  atorvastatin 40 milliGRAM(s) Oral at bedtime  brimonidine 0.2% Ophthalmic Solution 1 Drop(s) Left EYE three times a day  dextrose 5%. 1000 milliLiter(s) (50 mL/Hr) IV Continuous <Continuous>  dextrose 50% Injectable 12.5 Gram(s) IV Push once  dextrose 50% Injectable 25 Gram(s) IV Push once  dextrose 50% Injectable 25 Gram(s) IV Push once  dorzolamide 2%/timolol 0.5% Ophthalmic Solution 1 Drop(s) Left EYE two times a day  erythromycin   Ointment 1 Application(s) Left EYE four times a day  fenofibrate Tablet 145 milliGRAM(s) Oral daily  gabapentin 300 milliGRAM(s) Oral at bedtime  heparin   Injectable 5000 Unit(s) SubCutaneous every 8 hours  insulin glargine Injectable (LANTUS) 15 Unit(s) SubCutaneous at bedtime  insulin glargine Injectable (LANTUS) 15 Unit(s) SubCutaneous every morning  insulin lispro (HumaLOG) corrective regimen sliding scale   SubCutaneous three times a day before meals  levothyroxine 75 MICROGram(s) Oral daily  melatonin 3 milliGRAM(s) Oral at bedtime  mesalamine ER Capsule 500 milliGRAM(s) Oral two times a day  pantoprazole    Tablet 40 milliGRAM(s) Oral before breakfast  sodium chloride 0.9%. 1000 milliLiter(s) (100 mL/Hr) IV Continuous <Continuous>  valACYclovir 500 milliGRAM(s) Oral every 24 hours    MEDICATIONS  (PRN):  acetaminophen   Tablet .. 650 milliGRAM(s) Oral every 4 hours PRN Mild Pain (1 - 3)  dextrose 40% Gel 15 Gram(s) Oral once PRN Blood Glucose LESS THAN 70 milliGRAM(s)/deciliter  glucagon  Injectable 1 milliGRAM(s) IntraMuscular once PRN Glucose LESS THAN 70 milligrams/deciliter      VITALS: T(C): 36.6 (08-14-20 @ 11:47)  T(F): 97.9 (08-14-20 @ 11:47), Max: 97.9 (08-13-20 @ 23:34)  HR: 79 (08-14-20 @ 11:47) (78 - 82)  BP: 137/85 (08-14-20 @ 11:47) (137/85 - 145/92)  RR:  (18 - 18)  SpO2:  (91% - 95%)  Wt(kg): --  General: AAO x 3, appropriate mood and affect    Ophthalmology Exam:  Visual acuity (sc): 20/20 OD 20/40 (patient had lacrilube just placed) OS  Pupils: PERRL OU, no APD  Ttono: 20 OD 15 OS (patient squeezing OU)  Extraocular movements (EOMs): Full OU, no pain, no diplopia  Confrontational Visual Field (CVF): Full OU    Pen Light Exam (PLE)  External: Flat OD weeping vesicles and crusting over lesions OS  Lids/Lashes/Lacrimal Ducts: Flat OD weeping vesicles over left upper lid OS  Sclera/Conjunctiva: W+Q OD 1+ injection OS  Cornea: Cl OD fewer punctate staining inferiorly at 5-7 o clock and 1 o clock  Anterior Chamber: D+F OU    Iris: Flat OU  Lens: Cl OU

## 2020-08-14 NOTE — PROGRESS NOTE ADULT - ASSESSMENT
_________________________________________________________________________________________  ========>>  M E D I C A L   A T T E N D I N G    F O L L O W  U P  N O T E  <<=========  -----------------------------------------------------------------------------------------------------    - Patient seen and examined by me earlier today.   - In summary,  COLBY SAAB is a 64y year old woman who originally presented with facial pain   - Patient today overall doing ok, comfortable, eating fairly  . pain and swelling improved but still present .. pt with improved vision due to decreased swelling          pt noted with decreased urine output >> found out this morning pt had urinary retention with 700 ml on straight cath!! > d/w team to do bladder scan now and place forbes if needed     ==================>> REVIEW OF SYSTEM <<=================    GEN: no fever, no chills, pain as above   RESP: no SOB, no cough, no sputum  CVS: no chest pain, no palpitations  GI: no abdominal pain, no nausea, no constipation, no diarrhea  : no dysuria, no hematuria  Neuro: no headache, no dizziness  Derm : no itching, no rash    ==================>> PHYSICAL EXAM <<=================    GEN: A&O X 2 , NAD , comfortable, a bit disoriented today   HEENT: PERRL, MMM, hearing intact, injected conjunctiva,  facial zoster starting to crust over on mid forehead are >> overall improved   Neck: supple , no JVD appreciated  CVS: S1S2 , regular , No M/R/G appreciated  PULM: CTA B/L,  no W/R/R appreciated  ABD.: soft. non tender, non distended,  bowel sounds present, obese   Extrem: intact pulses , no edema   PSYCH : normal mood,  not anxious       ==================>> MEDICATIONS <<====================    amLODIPine   Tablet 5 milliGRAM(s) Oral daily  ATENolol  Tablet 50 milliGRAM(s) Oral daily  atorvastatin 40 milliGRAM(s) Oral at bedtime  brimonidine 0.2% Ophthalmic Solution 1 Drop(s) Left EYE three times a day  dextrose 5%. 1000 milliLiter(s) IV Continuous <Continuous>  dextrose 50% Injectable 12.5 Gram(s) IV Push once  dextrose 50% Injectable 25 Gram(s) IV Push once  dextrose 50% Injectable 25 Gram(s) IV Push once  dorzolamide 2%/timolol 0.5% Ophthalmic Solution 1 Drop(s) Left EYE two times a day  erythromycin   Ointment 1 Application(s) Left EYE four times a day  fenofibrate Tablet 145 milliGRAM(s) Oral daily  gabapentin 300 milliGRAM(s) Oral at bedtime  heparin   Injectable 5000 Unit(s) SubCutaneous every 8 hours  insulin glargine Injectable (LANTUS) 15 Unit(s) SubCutaneous at bedtime  insulin glargine Injectable (LANTUS) 15 Unit(s) SubCutaneous every morning  insulin lispro (HumaLOG) corrective regimen sliding scale   SubCutaneous three times a day before meals  levothyroxine 75 MICROGram(s) Oral daily  mesalamine ER Capsule 500 milliGRAM(s) Oral two times a day  pantoprazole    Tablet 40 milliGRAM(s) Oral before breakfast  sodium chloride 0.9%. 1000 milliLiter(s) IV Continuous <Continuous>    MEDICATIONS  (PRN):  acetaminophen   Tablet .. 650 milliGRAM(s) Oral every 4 hours PRN Mild Pain (1 - 3)  dextrose 40% Gel 15 Gram(s) Oral once PRN Blood Glucose LESS THAN 70 milliGRAM(s)/deciliter  glucagon  Injectable 1 milliGRAM(s) IntraMuscular once PRN Glucose LESS THAN 70 milligrams/deciliter    ___________  Active diet:  Diet, Regular:   Consistent Carbohydrate Evening Snack (CSTCHOSN)  Lacto-Ovo Veg (Accepts Milk Prod., Eggs)  ___________________    ==================>> VITAL SIGNS <<==================    Height (cm): 157.5  Weight (kg): 93.44  BMI (kg/m2): 37.7  Vital Signs Last 24 HrsT(C): 36.6 (08-14-20 @ 11:47)  T(F): 97.9 (08-14-20 @ 11:47), Max: 98.5 (08-13-20 @ 21:28)  HR: 79 (08-14-20 @ 11:47) (78 - 86)  BP: 137/85 (08-14-20 @ 11:47)  RR: 18 (08-14-20 @ 11:47) (18 - 18)  SpO2: 91% (08-14-20 @ 11:47) (91% - 96%)      POCT Blood Glucose.: 120 mg/dL (14 Aug 2020 09:11)  POCT Blood Glucose.: 136 mg/dL (14 Aug 2020 09:01)  POCT Blood Glucose.: 69 mg/dL (14 Aug 2020 08:29)  POCT Blood Glucose.: 69 mg/dL (14 Aug 2020 08:27)  POCT Blood Glucose.: 125 mg/dL (13 Aug 2020 21:23)  POCT Blood Glucose.: 149 mg/dL (13 Aug 2020 17:08)  POCT Blood Glucose.: 225 mg/dL (13 Aug 2020 12:46)     ==================>> LAB AND IMAGING <<==================       08-14    127<L>  |  95<L>  |  46<H>  ----------------------------<  73  3.9   |  16<L>  |  6.35<H>    Ca    8.4      14 Aug 2020 07:06    WBC count:   9.84 <<== ,  9.17 <<==   Hemoglobin:   12.2 <<==,  13.5 <<==  platelets:  348 <==, 389 <==    Creatinine:  6.35  <<==, 5.24  <<==, 4.32  <<==, 0.85  <<==, 0.72  <<==  Sodium:   127  <==, 126  <==, 129  <==, 133  <==, 134  <==       AST:          36 <== , 37 <==      ALT:        27  <== , 30  <==      AP:        54  <=, 62  <=     Bili:        0.4  <=, 0.4  <=    pending sono   ___________________________________________________________________________________  ===============>>  A S S E S S M E N T   A N D   P L A N <<===============  ------------------------------------------------------------------------------------------    · Assessment		  63 yo woman with a pmhx of Insulin dependent DM, Hypothyroidism, HLD, HTN, neuropathy, Crohn's disease on immunosuppressants present to the ED with eye redness and pain, and rash that started about 5 days prior       Problem/Plan - :  ·  Problem: KAVIN  possibly due to Acyclovir ?     a degeree of urinary retention based on reports   pt likely with baseline diabetic nephropathy  holding Acyclovir  AVOID all NSAID use  hold ACEI  Avoid nephrotoxic medications.   IVH to continue   nephrology appreciated   further testing in process / pending   monitor BMP  Forbes as needed  monitor urine output   may need short term HD ?     Problem/Plan - 1:  ·  Problem: Zoster ophthalmicus, facial shingles, conjunctivitis..   overall improved  ophtho and ID following, appreciated  hold Acyclovir as above    ointment and drops as ordered / adjusted   supportive care  pain mgmt.     Problem/Plan - 2:  ·  Problem: less likely Facial cellulitis  ID appreciated   monitor off systemic abx  erythromycin  Ointment as above     Problem/Plan - 3:  ·  Problem: Type 2 diabetes mellitus with other specified complication, with long-term current use of insulin.  Plan: meds reviewed with pt's son and reconciled   lantus BID dosing, 30 units  RISS  endo as needed  monitor FS and adjust meds as needed for better glycemic control   A1C 7.5  resume Ozempic on DC   Neurontin for diabetic neuropathy.     Problem/Plan - 4:  ·  Problem: Essential hypertension.  Plan: Continue Current medications and monitor.     Problem/Plan - 5:  ·  Problem: Hyperlipidemia, unspecified hyperlipidemia type.  Plan: Continue Current medications and monitor.     Problem/Plan - 6:  Problem: Class 3 severe obesity due to excess calories with serious comorbidity and body mass index (BMI) of 40.0 to 44.9 in adult. Plan: nutrition consult  - I had a long dissuasion with patient about life style changes, proper diet, exercise, weight loss, medication compliance, and close follow up and compliance with doctors.      Patient understood, all questions answered.     Problem/Plan - 7:  ·  Problem: Crohn's disease with complication, unspecified gastrointestinal tract location.  Plan: meds reviewed and reconciled with son  Balsalazide not formulary > changed to Mesalamine   monitor.     Problem/Plan - 8:  ·  Problem: Neuropathy due to type 2 diabetes mellitus.  Plan: Continue home medications and monitor.     Problem/Plan - 9:  ·  Problem: Need for prophylactic measure.  Plan: Lovenox  Protonix.     --------------------------------------------  Case discussed with pt, NP, RN//   Education given on findings and plan of care  ___________________________  H. JOÃO Forman.  Pager: [phone]

## 2020-08-14 NOTE — PROGRESS NOTE ADULT - SUBJECTIVE AND OBJECTIVE BOX
CC: f/u forV1 zoster left    Patient reports  she is very hungry  REVIEW OF SYSTEMS:  All other review of systems negative (Comprehensive ROS)    Antimicrobials Day #  :    Other Medications Reviewed    T(F): 97.9 (20 @ 11:47), Max: 98.5 (20 @ 21:28)  HR: 79 (20 @ 11:47)  BP: 137/85 (20 @ 11:47)  RR: 18 (20 @ 11:47)  SpO2: 91% (20 @ 11:47)  Wt(kg): --    PHYSICAL EXAM:  General: alert, no acute distress  Eyes:  anicteric, left  conjunctival injection, no discharge. rash over left face improved  Oropharynx: no lesions or injection 	  Neck: supple, without adenopathy  Lungs: clear to auscultation  Heart: regular rate and rhythm; no murmur, rubs or gallops  Abdomen: soft, nondistended, nontender, without mass or organomegaly  Skin: no lesions  Extremities: no clubbing, cyanosis, or edema  Neurologic: alert, oriented, moves all extremities    LAB RESULTS:        127<L>  |  95<L>  |  46<H>  ----------------------------<  73  3.9   |  16<L>  |  6.35<H>    Ca    8.4      14 Aug 2020 07:06        Urinalysis Basic - ( 14 Aug 2020 09:20 )    Color: Light Yellow / Appearance: Clear / S.006 / pH: x  Gluc: x / Ketone: Negative  / Bili: Negative / Urobili: <2 mg/dL   Blood: x / Protein: 30 mg/dL / Nitrite: Negative   Leuk Esterase: Negative / RBC: 11 /HPF / WBC 1 /HPF   Sq Epi: x / Non Sq Epi: 1 /HPF / Bacteria: Negative      MICROBIOLOGY:  RECENT CULTURES:      RADIOLOGY REVIEWED:              Assessment:  Patient with V1 zoster left with eye involved, got about 4 doses of acyclovir and developed kim. Zoster appears improved but given eye involved will restart systemic antiviral with po valtrex renally adjusted. .   Plan:  valtrex 500mg q 24h po  topical tx per ophtho  renal follows

## 2020-08-15 DIAGNOSIS — F05 DELIRIUM DUE TO KNOWN PHYSIOLOGICAL CONDITION: ICD-10-CM

## 2020-08-15 LAB
ALBUMIN SERPL ELPH-MCNC: 3 G/DL — LOW (ref 3.3–5)
ALP SERPL-CCNC: 75 U/L — SIGNIFICANT CHANGE UP (ref 40–120)
ALT FLD-CCNC: 23 U/L — SIGNIFICANT CHANGE UP (ref 10–45)
ANION GAP SERPL CALC-SCNC: 19 MMOL/L — HIGH (ref 5–17)
ANION GAP SERPL CALC-SCNC: 19 MMOL/L — HIGH (ref 5–17)
AST SERPL-CCNC: 45 U/L — HIGH (ref 10–40)
BASOPHILS # BLD AUTO: 0.03 K/UL — SIGNIFICANT CHANGE UP (ref 0–0.2)
BASOPHILS NFR BLD AUTO: 0.2 % — SIGNIFICANT CHANGE UP (ref 0–2)
BILIRUB SERPL-MCNC: 0.6 MG/DL — SIGNIFICANT CHANGE UP (ref 0.2–1.2)
BUN SERPL-MCNC: 57 MG/DL — HIGH (ref 7–23)
BUN SERPL-MCNC: 61 MG/DL — HIGH (ref 7–23)
CALCIUM SERPL-MCNC: 8.1 MG/DL — LOW (ref 8.4–10.5)
CALCIUM SERPL-MCNC: 8.1 MG/DL — LOW (ref 8.4–10.5)
CHLORIDE SERPL-SCNC: 97 MMOL/L — SIGNIFICANT CHANGE UP (ref 96–108)
CHLORIDE SERPL-SCNC: 98 MMOL/L — SIGNIFICANT CHANGE UP (ref 96–108)
CO2 SERPL-SCNC: 13 MMOL/L — LOW (ref 22–31)
CO2 SERPL-SCNC: 14 MMOL/L — LOW (ref 22–31)
CREAT ?TM UR-MCNC: 25 MG/DL — SIGNIFICANT CHANGE UP
CREAT SERPL-MCNC: 6.61 MG/DL — HIGH (ref 0.5–1.3)
CREAT SERPL-MCNC: 7.16 MG/DL — HIGH (ref 0.5–1.3)
CULTURE RESULTS: NO GROWTH — SIGNIFICANT CHANGE UP
EOSINOPHIL # BLD AUTO: 0.09 K/UL — SIGNIFICANT CHANGE UP (ref 0–0.5)
EOSINOPHIL NFR BLD AUTO: 0.7 % — SIGNIFICANT CHANGE UP (ref 0–6)
GAS PNL BLDA: SIGNIFICANT CHANGE UP
GAS PNL BLDV: SIGNIFICANT CHANGE UP
GLUCOSE BLDC GLUCOMTR-MCNC: 120 MG/DL — HIGH (ref 70–99)
GLUCOSE BLDC GLUCOMTR-MCNC: 134 MG/DL — HIGH (ref 70–99)
GLUCOSE BLDC GLUCOMTR-MCNC: 140 MG/DL — HIGH (ref 70–99)
GLUCOSE BLDC GLUCOMTR-MCNC: 184 MG/DL — HIGH (ref 70–99)
GLUCOSE BLDC GLUCOMTR-MCNC: 246 MG/DL — HIGH (ref 70–99)
GLUCOSE BLDC GLUCOMTR-MCNC: 39 MG/DL — CRITICAL LOW (ref 70–99)
GLUCOSE SERPL-MCNC: 273 MG/DL — HIGH (ref 70–99)
GLUCOSE SERPL-MCNC: 32 MG/DL — CRITICAL LOW (ref 70–99)
HCT VFR BLD CALC: 36.8 % — SIGNIFICANT CHANGE UP (ref 34.5–45)
HCT VFR BLD CALC: 40.2 % — SIGNIFICANT CHANGE UP (ref 34.5–45)
HGB BLD-MCNC: 11.8 G/DL — SIGNIFICANT CHANGE UP (ref 11.5–15.5)
HGB BLD-MCNC: 13.1 G/DL — SIGNIFICANT CHANGE UP (ref 11.5–15.5)
IMM GRANULOCYTES NFR BLD AUTO: 0.7 % — SIGNIFICANT CHANGE UP (ref 0–1.5)
LYMPHOCYTES # BLD AUTO: 1.22 K/UL — SIGNIFICANT CHANGE UP (ref 1–3.3)
LYMPHOCYTES # BLD AUTO: 8.9 % — LOW (ref 13–44)
MAGNESIUM SERPL-MCNC: 1.7 MG/DL — SIGNIFICANT CHANGE UP (ref 1.6–2.6)
MCHC RBC-ENTMCNC: 27.5 PG — SIGNIFICANT CHANGE UP (ref 27–34)
MCHC RBC-ENTMCNC: 27.5 PG — SIGNIFICANT CHANGE UP (ref 27–34)
MCHC RBC-ENTMCNC: 32.1 GM/DL — SIGNIFICANT CHANGE UP (ref 32–36)
MCHC RBC-ENTMCNC: 32.6 GM/DL — SIGNIFICANT CHANGE UP (ref 32–36)
MCV RBC AUTO: 84.5 FL — SIGNIFICANT CHANGE UP (ref 80–100)
MCV RBC AUTO: 85.8 FL — SIGNIFICANT CHANGE UP (ref 80–100)
MONOCYTES # BLD AUTO: 0.77 K/UL — SIGNIFICANT CHANGE UP (ref 0–0.9)
MONOCYTES NFR BLD AUTO: 5.6 % — SIGNIFICANT CHANGE UP (ref 2–14)
NEUTROPHILS # BLD AUTO: 11.46 K/UL — HIGH (ref 1.8–7.4)
NEUTROPHILS NFR BLD AUTO: 83.9 % — HIGH (ref 43–77)
NRBC # BLD: 0 /100 WBCS — SIGNIFICANT CHANGE UP (ref 0–0)
NRBC # BLD: 0 /100 WBCS — SIGNIFICANT CHANGE UP (ref 0–0)
PHOSPHATE SERPL-MCNC: 7 MG/DL — HIGH (ref 2.5–4.5)
PLATELET # BLD AUTO: 303 K/UL — SIGNIFICANT CHANGE UP (ref 150–400)
PLATELET # BLD AUTO: 375 K/UL — SIGNIFICANT CHANGE UP (ref 150–400)
POTASSIUM SERPL-MCNC: 4.4 MMOL/L — SIGNIFICANT CHANGE UP (ref 3.5–5.3)
POTASSIUM SERPL-MCNC: 5 MMOL/L — SIGNIFICANT CHANGE UP (ref 3.5–5.3)
POTASSIUM SERPL-SCNC: 4.4 MMOL/L — SIGNIFICANT CHANGE UP (ref 3.5–5.3)
POTASSIUM SERPL-SCNC: 5 MMOL/L — SIGNIFICANT CHANGE UP (ref 3.5–5.3)
PROT SERPL-MCNC: 6.7 G/DL — SIGNIFICANT CHANGE UP (ref 6–8.3)
RBC # BLD: 4.29 M/UL — SIGNIFICANT CHANGE UP (ref 3.8–5.2)
RBC # BLD: 4.76 M/UL — SIGNIFICANT CHANGE UP (ref 3.8–5.2)
RBC # FLD: 15.1 % — HIGH (ref 10.3–14.5)
RBC # FLD: 15.3 % — HIGH (ref 10.3–14.5)
SARS-COV-2 RNA SPEC QL NAA+PROBE: SIGNIFICANT CHANGE UP
SODIUM SERPL-SCNC: 130 MMOL/L — LOW (ref 135–145)
SODIUM SERPL-SCNC: 130 MMOL/L — LOW (ref 135–145)
SODIUM UR-SCNC: 79 MMOL/L — SIGNIFICANT CHANGE UP
SPECIMEN SOURCE: SIGNIFICANT CHANGE UP
WBC # BLD: 11.88 K/UL — HIGH (ref 3.8–10.5)
WBC # BLD: 13.67 K/UL — HIGH (ref 3.8–10.5)
WBC # FLD AUTO: 11.88 K/UL — HIGH (ref 3.8–10.5)
WBC # FLD AUTO: 13.67 K/UL — HIGH (ref 3.8–10.5)

## 2020-08-15 PROCEDURE — 99232 SBSQ HOSP IP/OBS MODERATE 35: CPT

## 2020-08-15 PROCEDURE — 93010 ELECTROCARDIOGRAM REPORT: CPT

## 2020-08-15 PROCEDURE — 71045 X-RAY EXAM CHEST 1 VIEW: CPT | Mod: 26

## 2020-08-15 PROCEDURE — 99222 1ST HOSP IP/OBS MODERATE 55: CPT | Mod: GC

## 2020-08-15 RX ORDER — QUETIAPINE FUMARATE 200 MG/1
12.5 TABLET, FILM COATED ORAL EVERY 8 HOURS
Refills: 0 | Status: DISCONTINUED | OUTPATIENT
Start: 2020-08-15 | End: 2020-08-15

## 2020-08-15 RX ORDER — SODIUM BICARBONATE 1 MEQ/ML
0.08 SYRINGE (ML) INTRAVENOUS
Qty: 150 | Refills: 0 | Status: DISCONTINUED | OUTPATIENT
Start: 2020-08-15 | End: 2020-08-19

## 2020-08-15 RX ORDER — HALOPERIDOL DECANOATE 100 MG/ML
0.5 INJECTION INTRAMUSCULAR EVERY 6 HOURS
Refills: 0 | Status: DISCONTINUED | OUTPATIENT
Start: 2020-08-15 | End: 2020-08-19

## 2020-08-15 RX ORDER — FUROSEMIDE 40 MG
40 TABLET ORAL ONCE
Refills: 0 | Status: COMPLETED | OUTPATIENT
Start: 2020-08-15 | End: 2020-08-15

## 2020-08-15 RX ORDER — INSULIN GLARGINE 100 [IU]/ML
10 INJECTION, SOLUTION SUBCUTANEOUS EVERY MORNING
Refills: 0 | Status: DISCONTINUED | OUTPATIENT
Start: 2020-08-15 | End: 2020-08-21

## 2020-08-15 RX ORDER — BUMETANIDE 0.25 MG/ML
2 INJECTION INTRAMUSCULAR; INTRAVENOUS ONCE
Refills: 0 | Status: COMPLETED | OUTPATIENT
Start: 2020-08-15 | End: 2020-08-15

## 2020-08-15 RX ORDER — IPRATROPIUM/ALBUTEROL SULFATE 18-103MCG
3 AEROSOL WITH ADAPTER (GRAM) INHALATION ONCE
Refills: 0 | Status: COMPLETED | OUTPATIENT
Start: 2020-08-15 | End: 2020-08-15

## 2020-08-15 RX ORDER — DEXTROSE 50 % IN WATER 50 %
25 SYRINGE (ML) INTRAVENOUS ONCE
Refills: 0 | Status: COMPLETED | OUTPATIENT
Start: 2020-08-15 | End: 2020-08-15

## 2020-08-15 RX ORDER — INSULIN GLARGINE 100 [IU]/ML
10 INJECTION, SOLUTION SUBCUTANEOUS AT BEDTIME
Refills: 0 | Status: DISCONTINUED | OUTPATIENT
Start: 2020-08-15 | End: 2020-08-21

## 2020-08-15 RX ORDER — HALOPERIDOL DECANOATE 100 MG/ML
0.5 INJECTION INTRAMUSCULAR ONCE
Refills: 0 | Status: COMPLETED | OUTPATIENT
Start: 2020-08-15 | End: 2020-08-15

## 2020-08-15 RX ORDER — FUROSEMIDE 40 MG
20 TABLET ORAL ONCE
Refills: 0 | Status: COMPLETED | OUTPATIENT
Start: 2020-08-15 | End: 2020-08-18

## 2020-08-15 RX ORDER — SODIUM BICARBONATE 1 MEQ/ML
650 SYRINGE (ML) INTRAVENOUS THREE TIMES A DAY
Refills: 0 | Status: DISCONTINUED | OUTPATIENT
Start: 2020-08-15 | End: 2020-08-18

## 2020-08-15 RX ADMIN — ATENOLOL 50 MILLIGRAM(S): 25 TABLET ORAL at 06:12

## 2020-08-15 RX ADMIN — Medication 50 MEQ/KG/HR: at 17:35

## 2020-08-15 RX ADMIN — ATORVASTATIN CALCIUM 40 MILLIGRAM(S): 80 TABLET, FILM COATED ORAL at 21:41

## 2020-08-15 RX ADMIN — Medication 0: at 17:35

## 2020-08-15 RX ADMIN — HALOPERIDOL DECANOATE 0.5 MILLIGRAM(S): 100 INJECTION INTRAMUSCULAR at 12:27

## 2020-08-15 RX ADMIN — BRIMONIDINE TARTRATE 1 DROP(S): 2 SOLUTION/ DROPS OPHTHALMIC at 12:32

## 2020-08-15 RX ADMIN — BRIMONIDINE TARTRATE 1 DROP(S): 2 SOLUTION/ DROPS OPHTHALMIC at 06:13

## 2020-08-15 RX ADMIN — Medication 25 GRAM(S): at 08:55

## 2020-08-15 RX ADMIN — Medication 3 MILLIGRAM(S): at 21:41

## 2020-08-15 RX ADMIN — PANTOPRAZOLE SODIUM 40 MILLIGRAM(S): 20 TABLET, DELAYED RELEASE ORAL at 06:12

## 2020-08-15 RX ADMIN — Medication 1 APPLICATION(S): at 17:31

## 2020-08-15 RX ADMIN — Medication 500 MILLIGRAM(S): at 06:12

## 2020-08-15 RX ADMIN — Medication 1 APPLICATION(S): at 06:13

## 2020-08-15 RX ADMIN — SODIUM CHLORIDE 50 MILLILITER(S): 9 INJECTION INTRAMUSCULAR; INTRAVENOUS; SUBCUTANEOUS at 03:43

## 2020-08-15 RX ADMIN — BUMETANIDE 2 MILLIGRAM(S): 0.25 INJECTION INTRAMUSCULAR; INTRAVENOUS at 20:52

## 2020-08-15 RX ADMIN — DORZOLAMIDE HYDROCHLORIDE TIMOLOL MALEATE 1 DROP(S): 20; 5 SOLUTION/ DROPS OPHTHALMIC at 06:12

## 2020-08-15 RX ADMIN — GABAPENTIN 300 MILLIGRAM(S): 400 CAPSULE ORAL at 21:41

## 2020-08-15 RX ADMIN — BRIMONIDINE TARTRATE 1 DROP(S): 2 SOLUTION/ DROPS OPHTHALMIC at 21:43

## 2020-08-15 RX ADMIN — Medication 1 APPLICATION(S): at 23:11

## 2020-08-15 RX ADMIN — INSULIN GLARGINE 10 UNIT(S): 100 INJECTION, SOLUTION SUBCUTANEOUS at 21:39

## 2020-08-15 RX ADMIN — VALACYCLOVIR 500 MILLIGRAM(S): 500 TABLET, FILM COATED ORAL at 21:47

## 2020-08-15 RX ADMIN — Medication 75 MICROGRAM(S): at 06:12

## 2020-08-15 RX ADMIN — Medication 650 MILLIGRAM(S): at 21:40

## 2020-08-15 RX ADMIN — HEPARIN SODIUM 5000 UNIT(S): 5000 INJECTION INTRAVENOUS; SUBCUTANEOUS at 12:28

## 2020-08-15 RX ADMIN — Medication 500 MILLIGRAM(S): at 17:31

## 2020-08-15 RX ADMIN — Medication 40 MILLIGRAM(S): at 01:06

## 2020-08-15 RX ADMIN — Medication 3 MILLILITER(S): at 01:05

## 2020-08-15 RX ADMIN — Medication 50 MEQ/KG/HR: at 21:47

## 2020-08-15 RX ADMIN — DORZOLAMIDE HYDROCHLORIDE TIMOLOL MALEATE 1 DROP(S): 20; 5 SOLUTION/ DROPS OPHTHALMIC at 17:31

## 2020-08-15 RX ADMIN — Medication 1 APPLICATION(S): at 12:27

## 2020-08-15 RX ADMIN — Medication 145 MILLIGRAM(S): at 12:28

## 2020-08-15 RX ADMIN — HEPARIN SODIUM 5000 UNIT(S): 5000 INJECTION INTRAVENOUS; SUBCUTANEOUS at 21:43

## 2020-08-15 RX ADMIN — AMLODIPINE BESYLATE 5 MILLIGRAM(S): 2.5 TABLET ORAL at 06:12

## 2020-08-15 RX ADMIN — HEPARIN SODIUM 5000 UNIT(S): 5000 INJECTION INTRAVENOUS; SUBCUTANEOUS at 06:12

## 2020-08-15 NOTE — PROVIDER CONTACT NOTE (CRITICAL VALUE NOTIFICATION) - SITUATION
this test was from the am routine labs.  stat was glucose 39 and we already fixed it.  d50 given and >100 x 2.

## 2020-08-15 NOTE — CHART NOTE - NSCHARTNOTEFT_GEN_A_CORE
CC: hypoxia, shortness of breath      HPI:  Called by RN to evaluate patient for episode of hypoxia with SpO2 noted to be 74% on room air; at that time, patient had pulled out her Delvalle catheter. Patient was placed on NRB mask with improvement in SpO2 to 100%. Patient seen and assessed at bedside, noted with work of breathing,   Patient denied headache, dizziness, CP, SOB, abdominal  pain, N/V/D, numbness/tingling, extremity weakness, dysuria.         ROS:  CONSTITUTIONAL:  No fever, chills, rigors  CARDIOVASCULAR:  No chest pain or palpitations  RESPIRATORY:   No SOB, cough, wheezing  GASTROINTESTINAL:  No abd pain, N/V/D  EXTREMITIES:  No swelling or joint pain  GENITOURINARY:  No burning on urination, increased frequency or urgency.  No flank pain.  NEUROLOGIC:  No HA, visual disturbances  SKIN: No rashes        PAST MEDICAL & SURGICAL HISTORY:  Other specified disorders of eye and adnexa  Handoff  MEWS Score  Eye redness  Need for prophylactic measure  Neuropathy due to type 2 diabetes mellitus  Crohn's disease with complication, unspecified gastrointestinal tract location  Class 3 severe obesity due to excess calories with serious comorbidity and body mass index (BMI) of 40.0 to 44.9 in adult  Hyperlipidemia, unspecified hyperlipidemia type  Essential hypertension  Type 2 diabetes mellitus with other specified complication, with long-term current use of insulin  Facial cellulitis  Zoster ophthalmicus  LEFT EYE CELLULITUS  SysAdmin_VisitLink        Vital Signs Last 24 Hrs  T(C): 36.6 (14 Aug 2020 22:01), Max: 36.6 (14 Aug 2020 11:47)  T(F): 97.8 (14 Aug 2020 22:01), Max: 97.9 (14 Aug 2020 11:47)  HR: 73 (14 Aug 2020 22:01) (73 - 79)  BP: 153/79 (14 Aug 2020 22:01) (137/85 - 153/79)  BP(mean): --  RR: 18 (14 Aug 2020 22:01) (18 - 18)  SpO2: 92% (14 Aug 2020 22:01) (91% - 93%)      Physical Exam:  General: WN/WD NAD, AOx3, nontoxic appearing  Head:  NC/AT  CV: RRR, S1S2   Respiratory: CTA B/L, nonlabored  Abdominal: (+) bowel sounds x4. Soft, NT, ND, no palpable mass, no guarding, or rebound tenderness  Genitourinary: ? Mook   MSK: No BLLE edema, + peripheral pulses, FROM all 4 extremity  Skin: (+) warm, dry   Psych: Appropriate affect       Labs:    08-14    127<L>  |  95<L>  |  46<H>  ----------------------------<  73  3.9   |  16<L>  |  6.35<H>    Ca    8.4      14 Aug 2020 07:06        Urinalysis Basic - ( 08-14 @ 09:20 )    Color: Negative / Appearance: Negative / SG: -- / pH: Negative  Gluc: Negative / Ketone: Light Yellow  / Bili: -- / Urobili: 0   Blood: 1 / Protein: -- / Nitrite: Negative   Leuk Esterase: Negative / RBC: 1.006 / WBC --   Sq Epi: 30 mg/dL / Non Sq Epi: 11 / Bacteria: 6.5            Radiology:          Assessment & Plan:  HPI:  >>>>>>Medical Attending Initial / Admission note<<<<<<  -------------------------------------------------------------------------------  CHIEF COMPLAINT & HISTORY OF PRESENT ILLNESS:      Pt is a 65 yo woman with a pmhx of Insulin dependent DM, Hypothyroidism, HLD, HTN, neuropathy, Crohn's disease on immunosuppressants present to the ED with eye redness and pain, and rash that started about 5 days ago,   She was seen in urgent care and was started on Tobramycin eye drops ~ 3days ago, and seen by ophthalmologist yesterday and given doxycycline which she took total 2 doses) with no improvement and came to ED today   Her rash is localized over her left eye and forehead. She denies rashes anywhere else. She denies any vision changes. c/o pain  and pressure on that side of the face   She denies any CP, SOB, abdominal pain, urinary symptoms, numbness or tingling, tinnitus, ear pain.  + feverish on and off for the past few days at home and took tylenol as needed   in ED also febrile, not resolved with tylenols     --------------------------------------------------------------------------------  PAST MEDICAL HISTORY:    DM  Crohn's  Hypothyroidism  HLD  HTN  neuropathy    --------------------------------------------------------------------------------  PAST SURGICAL HISTORY:    Bilateral cataract Sx  Rt knee sx    --------------------------------------------------------------------------------  FAMILY HISTORY:    none / denies   --------------------------------------------------------------------------------  SOCIAL HISTORY:  Alcohol: None reported  Smoking: None reported     --------------------------------------------------------------------------------  ALLERGIES:    [As shireen, josse]    --------------------------------------------------------------------------------  MEDICATIONS:   [As shireen, reviewed]    --------------------------------------------------------------------------------  REVIEW OF SYSTEM:    GEN: + fever, no chills, + pain as above , no vial changes   RESP: no SOB, no cough, no sputum  CVS: no chest pain, no palpitations, no edema  GI: no abdominal pain, no nausea, no vomiting, no constipation, no diarrhea  : no dysuria, no frequency, no hematuria  Neuro: no headache, no dizziness  PSYCH: no anxiety, no depression  Derm : no itching, + rash, pain and swelling as above     --------------------------------------------------------------------------------  VITAL SIGNS:     T(C): 37.9 (08-11-20 @ 17:13), Max: 37.9 (08-11-20 @ 17:13)  HR: 94 (08-11-20 @ 17:13) (90 - 94)  BP: 133/63 (08-11-20 @ 17:13) (133/63 - 158/88)  BP(mean): 108 (08-11-20 @ 11:39)  RR: 18 (08-11-20 @ 17:13) (18 - 20)  SpO2: 98% (08-11-20 @ 17:13) (97% - 98%)     --------------------------------------------------------------------------------  PHYSICAL EXAM:    GEN: A&O X 3 , NAD , comfortable  HEENT: PERRL, MMM, hearing intact     left facial swelling and redness, some pustules.. left conjunctival injection and edema   Neck: supple , no JVD  CVS: S1S2 , regular , No M/R/G appreciated  PULM: CTA B/L,  limited exam due to body habitus.   ABD.: soft. non tender, non distended,  obese   Extrem: intact pulses , no edema   Derm: No rash , no ecchymoses  PSYCH : normal mood,  no delusion not anxious    --------------------------------------------------------------------------------  LAB AND IMAGING:   [As shireen, reviewed]    --------------------------------------------------------------------------------  ASSESSMENT AND PLAN:   [As shireen]    --------------------  Case discussed with pt, RN, ED, ID   ___________________________  H. JOÃO Forman.  Pager: [phone] (11 Aug 2020 17:28)        -  -  -Will continue to closely monitor patient/vitals  -Primary Team to follow up in AM, attending to follow       Jaden Antonio PA-C  Dept of Medicine CC: hypoxia, shortness of breath      HPI:  Called by RN to evaluate patient for episode of hypoxia with SpO2 noted to be 74% on room air; at that time, patient had pulled out her Delvalle catheter. Patient was placed on NRB mask with improvement in SpO2 to 100%. Patient seen and assessed at bedside, noted with work of breathing with abdominal muscle use. Patient not answering questions, unable to obtain ROS.          ROS:  Unable to assess secondary to patient's current status      PAST MEDICAL & SURGICAL HISTORY:  Other specified disorders of eye and adnexa  Handoff  MEWS Score  Eye redness  Need for prophylactic measure  Neuropathy due to type 2 diabetes mellitus  Crohn's disease with complication, unspecified gastrointestinal tract location  Class 3 severe obesity due to excess calories with serious comorbidity and body mass index (BMI) of 40.0 to 44.9 in adult  Hyperlipidemia, unspecified hyperlipidemia type  Essential hypertension  Type 2 diabetes mellitus with other specified complication, with long-term current use of insulin  Facial cellulitis  Zoster ophthalmicus  LEFT EYE CELLULITUS  SysAdmin_VisitLink        Vital Signs Last 24 Hrs  T(C): 36.6 (14 Aug 2020 22:01), Max: 36.6 (14 Aug 2020 11:47)  T(F): 97.8 (14 Aug 2020 22:01), Max: 97.9 (14 Aug 2020 11:47)  HR: 73 (14 Aug 2020 22:01) (73 - 79)  BP: 153/79 (14 Aug 2020 22:01) (137/85 - 153/79)  RR: 18 (14 Aug 2020 22:01) (18 - 18)  SpO2: 92% (14 Aug 2020 22:01) (91% - 93%)      Physical Exam:  General: Obese female sitting up in bed, on NRB, increased work of breathing, alert and awake  Head:  (+) herpes zoster rash on left side of face  EENT: (+) left eye conjunctival injection  CV: RRR, S1S2   Respiratory: (+) increased WOB. (+) bibasilar crackles  Abdominal: (+) bowel sounds x4. Large, soft abdomen, NT  Genitourinary: (+) Delvalle, removed by patient. Bag noted with ~300mL yellow urine.   MSK: No BLLE edema, + peripheral pulses, FROM all 4 extremity  Skin: (+) warm, dry         Labs:    08-14    127<L>  |  95<L>  |  46<H>  ----------------------------<  73  3.9   |  16<L>  |  6.35<H>    Ca    8.4      14 Aug 2020 07:06        Urinalysis Basic - ( 08-14 @ 09:20 )  Color: Negative / Appearance: Negative / SG: -- / pH: Negative  Gluc: Negative / Ketone: Light Yellow  / Bili: -- / Urobili: 0   Blood: 1 / Protein: -- / Nitrite: Negative   Leuk Esterase: Negative / RBC: 1.006 / WBC --   Sq Epi: 30 mg/dL / Non Sq Epi: 11 / Bacteria: 6.5            Radiology:  < from: US Kidney and Bladder (08.14.20 @ 13:16) >  Normal renal ultrasound.  < end of copied text >              Assessment & Plan:  Patient with V1 zoster left with eye involved, got about 4 doses of acyclovir and developed kim. Zoster appears improved but given eye involved will restart systemic antiviral with po valtrex renally adjusted.   Patient now presenting acutely with episode of hypoxia noted shortly after she pulled out her Delvalle catheter. Patient was immediately placed on NRB mask with improvement in Spo2 to 100% however patient still noted with work of breathing.     #Shortness of breath with work of breathing- ?secondary to fluid overload  -Urgent CXR ordered  -Lasix 40mg IVP STAT x1  -Duoneb treatment STAT x1  -STAT Blood gas ordered  -Delvalle replaced to monitor I/Os  -IVF on hold while awaiting results from CXR  -Will place patient on BiPAP for increased work of breathing  -Will continue to closely monitor patient/vitals  -Primary Team to follow up in AM, attending to follow       Jaden Antonio PA-C  Dept of Medicine  [id number] CC: hypoxia, shortness of breath      HPI:  Called by RN to evaluate patient for episode of hypoxia with SpO2 noted to be 74% on room air; at that time, patient had pulled out her Delvalle catheter. Patient was placed on NRB mask with improvement in SpO2 to 100%. Patient seen and assessed at bedside, noted with work of breathing with abdominal muscle use. Patient not answering questions, unable to obtain ROS.          ROS:  Unable to assess secondary to patient's current status      PAST MEDICAL & SURGICAL HISTORY:  Other specified disorders of eye and adnexa  Handoff  MEWS Score  Eye redness  Need for prophylactic measure  Neuropathy due to type 2 diabetes mellitus  Crohn's disease with complication, unspecified gastrointestinal tract location  Class 3 severe obesity due to excess calories with serious comorbidity and body mass index (BMI) of 40.0 to 44.9 in adult  Hyperlipidemia, unspecified hyperlipidemia type  Essential hypertension  Type 2 diabetes mellitus with other specified complication, with long-term current use of insulin  Facial cellulitis  Zoster ophthalmicus  LEFT EYE CELLULITUS  SysAdmin_VisitLink        Vital Signs Last 24 Hrs  T(C): 36.6 (14 Aug 2020 22:01), Max: 36.6 (14 Aug 2020 11:47)  T(F): 97.8 (14 Aug 2020 22:01), Max: 97.9 (14 Aug 2020 11:47)  HR: 73 (14 Aug 2020 22:01) (73 - 79)  BP: 153/79 (14 Aug 2020 22:01) (137/85 - 153/79)  RR: 18 (14 Aug 2020 22:01) (18 - 18)  SpO2: 92% (14 Aug 2020 22:01) (91% - 93%)      Physical Exam:  General: Obese female sitting up in bed, on NRB, increased work of breathing, alert and awake  Head:  (+) herpes zoster rash on left side of face  EENT: (+) left eye conjunctival injection  CV: RRR, S1S2   Respiratory: (+) increased WOB (+) bibasilar crackles (+) expiratory wheezes  Abdominal: (+) bowel sounds x4. Large, soft abdomen, NT  Genitourinary: (+) Delvalle, removed by patient. Bag noted with ~300mL yellow urine.   MSK: No BLLE edema, + peripheral pulses, FROM all 4 extremity  Skin: (+) warm, dry         Labs:    08-14    127<L>  |  95<L>  |  46<H>  ----------------------------<  73  3.9   |  16<L>  |  6.35<H>    Ca    8.4      14 Aug 2020 07:06        Urinalysis Basic - ( 08-14 @ 09:20 )  Color: Negative / Appearance: Negative / SG: -- / pH: Negative  Gluc: Negative / Ketone: Light Yellow  / Bili: -- / Urobili: 0   Blood: 1 / Protein: -- / Nitrite: Negative   Leuk Esterase: Negative / RBC: 1.006 / WBC --   Sq Epi: 30 mg/dL / Non Sq Epi: 11 / Bacteria: 6.5        Radiology:  < from: US Kidney and Bladder (08.14.20 @ 13:16) >  Normal renal ultrasound.  < end of copied text >        Assessment & Plan:  Patient with V1 zoster left with eye involved, got about 4 doses of acyclovir and developed kim. Zoster appears improved but given eye involved will restart systemic antiviral with po valtrex renally adjusted.   Patient now presenting acutely with episode of hypoxia with SpO2 74% on room air noted shortly after she pulled out her Delvalle catheter. Patient was immediately placed on NRB mask with improvement in Spo2 to 100% however patient still noted with work of breathing. Decision was made to place patient on BiPAP for increased work of breathing. Per RN, patient with episodes of vomiting 2 nights ago (?aspiration).     #Shortness of breath with work of breathing- ?secondary to fluid overload vs pneumonia   -Vital signs hemodynamically stable, patient is afebrile  -Urgent CXR ordered  -Lasix 40mg IVP STAT x1  -Duoneb treatment STAT x1  -STAT Blood gas ordered  -Delvalle replaced to monitor I/Os  -IVF on hold while awaiting results from CXR  -Will place patient on BiPAP for increased work of breathing  -Will continue to closely monitor patient/vitals  -Primary Team to follow up in AM, attending to follow       Jaden Antonio PA-C  Dept of Medicine  [id number]

## 2020-08-15 NOTE — CHART NOTE - NSCHARTNOTEFT_GEN_A_CORE
Patient is a 64y old  Female who presents with a chief complaint of facial pain (15 Aug 2020 14:38)      RRT Called by RN  for hypoxic respiratory failure. O2 sat 87% 0n BIPAP. RRT Seen and examined at the bedside.  Patient is having SOB with volume overload. Bumex 2 mg IV given.    Vital Signs Last 24 Hrs  T(C): 36.4 (15 Aug 2020 13:52), Max: 36.5 (15 Aug 2020 00:30)  T(F): 97.5 (15 Aug 2020 13:52), Max: 97.7 (15 Aug 2020 00:30)  HR: 81 (15 Aug 2020 13:52) (69 - 90)  BP: 138/83 (15 Aug 2020 13:52) (126/85 - 150/79)  BP(mean): --  RR: 24 (15 Aug 2020 06:06) (20 - 25)  SpO2: 97% (15 Aug 2020 13:52) (74% - 99%)                          13.1   13.67 )-----------( 375      ( 15 Aug 2020 21:03 )             40.2     08-15    130<L>  |  97  |  61<H>  ----------------------------<  273<H>  5.0   |  14<L>  |  6.61<H>    Ca    8.1<L>      15 Aug 2020 21:03  Phos  7.0     08-15  Mg     1.7     08-15    TPro  6.7  /  Alb  3.0<L>  /  TBili  0.6  /  DBili  x   /  AST  45<H>  /  ALT  23  /  AlkPhos  75  08-15        PHYSICAL EXAM:  GENERAL: NAD, well-developed  HEAD:  Atraumatic, Normocephalic  EYES: EOMI, PERRLA, conjunctiva and sclera clear  NECK: Supple, No JVD  CHEST/LUNG: Clear to auscultation bilaterally; No wheeze  HEART: Regular rate and rhythm; No murmurs, rubs, or gallops  ABDOMEN: Soft, Nontender, Nondistended; Bowel sounds present  EXTREMITIES:  2+ Peripheral Pulses, No clubbing, cyanosis, or edema  PSYCH: AAOx3  NEUROLOGY: non-focal  SKIN: No rashes or lesions    Plan and RRT recommendation:    - continue to monitor urine output.   - If the patient does not respond to bumex over the next two hours, will consult MICU for urgent dialysis in the setting of acute renal failure.   - If the patient puts out a good amount of urine over the next few hours with improvement in respiratory status, would continue with 2 mg of IV bumex BID vs. non-urgent dialysis   - Will follow up BMP.  ABG, NIPPV/CPAP  2 mg Bumetanide IV  BIPAP on 100 % O2  Remained on unit  Son updated on patient status  D/w Attending, Dr. Snow          Phone: [phone]

## 2020-08-15 NOTE — PROGRESS NOTE ADULT - ASSESSMENT
_____· Assessment		  63 yo woman with a pmhx of Insulin dependent DM, Hypothyroidism, HLD, HTN, neuropathy, Crohn's disease on immunosuppressants present to the ED with eye redness and pain, and rash that started about 5 days prior      08/15/2020 Acute Hypoxic Respiratory Failure - Patient was placed on Bipap last night. Cxr shows pulmonary edema.   Will get CT chest, currently sating well.   Will give low dose Lasix for now. Follow up BNP am tomorrow.        Problem/Plan - :  ·  Problem: KAVIN  possibly due to Acyclovir ?     a degeree of urinary retention based on reports   pt likely with baseline diabetic nephropathy  holding Acyclovir  AVOID all NSAID use  hold ACEI  Avoid nephrotoxic medications.   IVH to continue   nephrology appreciated   further testing in process / pending   monitor BMP  Taylor as needed  monitor urine output   may need short term HD ?     Problem/Plan - 1:  ·  Problem: Zoster ophthalmicus, facial shingles, conjunctivitis..   overall improved  ophtho and ID following, appreciated  hold Acyclovir as above    ointment and drops as ordered / adjusted   supportive care  pain mgmt.     Problem/Plan - 2:  ·  Problem: less likely Facial cellulitis  ID appreciated   monitor off systemic abx  erythromycin  Ointment as above     Problem/Plan - 3:  ·  Problem: Type 2 diabetes mellitus with other specified complication, with long-term current use of insulin.  Plan: meds reviewed with pt's son and reconciled   lantus BID dosing, 30 units  RISS  endo as needed  monitor FS and adjust meds as needed for better glycemic control   A1C 7.5  resume Ozempic on DC   Neurontin for diabetic neuropathy.     Problem/Plan - 4:  ·  Problem: Essential hypertension.  Plan: Continue Current medications and monitor.     Problem/Plan - 5:  ·  Problem: Hyperlipidemia, unspecified hyperlipidemia type.  Plan: Continue Current medications and monitor.     Problem/Plan - 6:  Problem: Class 3 severe obesity due to excess calories with serious comorbidity and body mass index (BMI) of 40.0 to 44.9 in adult. Plan: nutrition consult  Problem/Plan - 7:  ·  Problem: Crohn's disease with complication, unspecified gastrointestinal tract location.  Plan: meds reviewed and reconciled with son  Balsalazide not formulary > changed to Mesalamine   monitor.     Problem/Plan - 8:  ·  Problem: Neuropathy due to type 2 diabetes mellitus.  Plan: Continue home medications and monitor.     Problem/Plan - 9:  ·  Problem: Need for prophylactic measure.  Plan: Lovenox  Protonix.     ----------------------------

## 2020-08-15 NOTE — BEHAVIORAL HEALTH ASSESSMENT NOTE - HPI (INCLUDE ILLNESS QUALITY, SEVERITY, DURATION, TIMING, CONTEXT, MODIFYING FACTORS, ASSOCIATED SIGNS AND SYMPTOMS)
Pt is a 65 yo Staci speaking woman with a pmhx of Insulin dependent DM, Hypothyroidism, HLD, HTN, neuropathy, Crohn's disease on immunosuppressants present to the ED with eye redness and pain, and rash that started about 5 days ago.  She was seen in urgent care and was started on Tobramycin eye drops ~ 3days ago, and seen by ophthalmologist yesterday and given doxycycline which she took total 2 doses) with no improvement and came to ED today.  Her rash is localized over her left eye and forehead. She denies rashes anywhere else. She denies any vision changes. c/o pain  and pressure on that side of the face.    She denies any CP, SOB, abdominal pain, urinary symptoms, numbness or tingling, tinnitus, ear pain.+ feverish on and off for the past few days at home and took tylenol as needed in ED also febrile, not resolved with tylenols.  Hospital course has also been complicated by acute kidney injury.   Consult called for confusion and agitation.  Just prior to evaluation, patient was given haldol 0.5mg iv for agitation and was currently sedated/lethargic and unable to complete for assessment.  Spoke to patient's son at bedside who provided further history.    Patient has no prior psychiatric contact.  No history of suicide attempts.  No history of substance use.  There is no family history of psychiatric illness except for the patient's mother who had dementia in her 80s.  Patient at baseline has no history of dementia or other confusional states.       According to son, patient has been more confused.  He reports that she has been visually hallucinating of seeing people in her room.  She is convinced that these people are attempting to kill her.  She has the confusion worse at night.    She has been agitated and yesterday pulled out her Delvalle according to the medical team.    she has been sleeping most of the day and up at night.   Her appetite is decreased.   there are no depressive or manic symptoms.   Symptoms started since admission

## 2020-08-15 NOTE — PROGRESS NOTE ADULT - ASSESSMENT
63 yo woman with a pmhx of Insulin dependent DM, Hypothyroidism, HLD, HTN, neuropathy, Crohn's disease on immunosuppressants admitted for zoster opthalmicus found to have KIERA    Kiera   scr 0.8 on 8/12  Scr continues to worsen today--   KIERA multifactorial -- sec to hyperglycmia, ACE, Ketorolac, Acyclovir and rentention   s/p bladder scan with 700cc urine --s/p forbes  continue to HOLD lisinopril (last dose on 8/13)  Optimize glucose control  AVOID all NSAID use (s/p ketorolac on 8/11)  Acyclovir on hold,- -last dose on 8/12  FENa >1 suggest ATN, Renal US is unremarkable  Urine has minimal protein, RBC + but has forbes  Has been hypoglycemia, may be sec to worsening of renal function  Change IVF D5 with NaHCO3 150meq/L 50cc/hr  urine output has improved  Monitor at present  Consent obtained for RRT if needed  Monitor BMP   AVoid further nephrotoxics, NSAIDS RCA  * Discussed with patient and daughter at length    HTN  BP stable  Hold lisinopril  Monitor BP  Increase amlodipine to 10mg If needed  Low salt diet       Hyponatremia  etiology? hyperglycemia contributing  Work up suggest polydipsia/liquid diet intake  Monitor at present 63 yo woman with a pmhx of Insulin dependent DM, Hypothyroidism, HLD, HTN, neuropathy, Crohn's disease on immunosuppressants admitted for zoster opthalmicus found to have KIERA    Kiera   scr 0.8 on 8/12  Scr continues to worsen today--   KIERA multifactorial -- sec to hyperglycmia, ACE, Ketorolac, Acyclovir and rentention   s/p bladder scan with 700cc urine --s/p forbes  continue to HOLD lisinopril (last dose on 8/13)  Optimize glucose control  AVOID all NSAID use (s/p ketorolac on 8/11)  Acyclovir on hold,- -last dose on 8/12  FENa >1 suggest ATN, Renal US is unremarkable  Urine has minimal protein, RBC + but has forbes  Has been hypoglycemia, may be sec to worsening of renal function  Change IVF D5 with NaHCO3 150meq/L 50cc/hr  urine output has improved  Monitor at present  Consent obtained for RRT if needed  Monitor BMP   AVoid further nephrotoxics, NSAIDS RCA  * Discussed with patient and daughter at length    Acidosis:  AG + Non-AG  Start NaHCO3 650mg TID    HTN  BP stable  Hold lisinopril  Monitor BP  Increase amlodipine to 10mg If needed  Low salt diet       Hyponatremia  etiology? hyperglycemia contributing  Work up suggest polydipsia/liquid diet intake  Monitor at present 65 yo woman with a pmhx of Insulin dependent DM, Hypothyroidism, HLD, HTN, neuropathy, Crohn's disease on immunosuppressants admitted for zoster opthalmicus found to have KIERA    Kiera   scr 0.8 on 8/12  Scr continues to worsen today--   KIERA multifactorial -- sec to hyperglycmia, ACE, Ketorolac, Acyclovir and rentention   s/p bladder scan with 700cc urine --s/p forbes  continue to HOLD lisinopril (last dose on 8/13)  Optimize glucose control  AVOID all NSAID use (s/p ketorolac on 8/11)  Acyclovir on hold,- -last dose on 8/12  FENa >1 suggest ATN, Renal US is unremarkable  Urine has minimal protein, RBC + but has forbes  Has been hypoglycemia, may be sec to worsening of renal function  Change IVF D5 with NaHCO3 150meq/L 50cc/hr  urine output has improved  Monitor at present  Use Lasix 40mg IV PRN  Consent obtained for RRT if needed  Monitor BMP   AVoid further nephrotoxics, NSAIDS RCA  * Discussed with patient and daughter at length    Acidosis:  AG + Non-AG  Start NaHCO3 650mg TID    HTN  BP stable  Hold lisinopril  Monitor BP  Increase amlodipine to 10mg If needed  Low salt diet       Hyponatremia  etiology? hyperglycemia contributing  Work up suggest polydipsia/liquid diet intake  Monitor at present

## 2020-08-15 NOTE — BEHAVIORAL HEALTH ASSESSMENT NOTE - SUMMARY
65 yo woman with a pmhx of Insulin dependent DM, Hypothyroidism, HLD, HTN, neuropathy, Crohn's disease on immunosuppressants present to the ED with eye redness and pain, and rash that started about 5 days ago.   Hospital course has complicated by acute kidney injury.  Called to assess patient for agitation.  patient received haldol 0.5mg iv just prior to evaluation and currently was sedated.  Spoke to patient son who reports at baseline, patient does not have any memory issues or confusion.  Since being in the hospital, patient has had hallucinations, paranoia, reversal of day/night, confusion, agitation (removed forbes 1 day ago).  patient likely suffering from delirium secondary to general medical condition

## 2020-08-15 NOTE — PROGRESS NOTE ADULT - SUBJECTIVE AND OBJECTIVE BOX
CC: f/u for  V1 zoster  Patient reports she is having blurry vision    REVIEW OF SYSTEMS:  All other review of systems negative (Comprehensive ROS)    Antimicrobials Day #  :  valACYclovir 500 milliGRAM(s) Oral every 24 hours    Other Medications Reviewed    T(F): 97.5 (08-15-20 @ 13:52), Max: 97.8 (20 @ 22:01)  HR: 81 (08-15-20 @ 13:52)  BP: 138/83 (08-15-20 @ 13:52)  RR: 24 (08-15-20 @ 06:06)  SpO2: 97% (08-15-20 @ 13:52)  Wt(kg): --    PHYSICAL EXAM:  General:more  alert, no acute distress  Eyes:  anicteric, left  conjunctival injection, no discharge, vesicles crusting, can see how many fingers in both eyes  Oropharynx: no lesions or injection 	  Neck: supple, without adenopathy  Lungs: rales  to auscultation  Heart: regular rate and rhythm; no murmur, rubs or gallops  Abdomen: soft, nondistended, nontender, without mass or organomegaly  Skin: no lesions  Extremities: no clubbing, cyanosis, or edema  Neurologic: follows commands moves all extremities    LAB RESULTS:                        11.8   11.88 )-----------( 303      ( 15 Aug 2020 07:35 )             36.8     08-15    130<L>  |  98  |  57<H>  ----------------------------<  32<LL>  4.4   |  13<L>  |  7.16<H>    Ca    8.1<L>      15 Aug 2020 07:32        Urinalysis Basic - ( 14 Aug 2020 09:20 )    Color: Light Yellow / Appearance: Clear / S.006 / pH: x  Gluc: x / Ketone: Negative  / Bili: Negative / Urobili: <2 mg/dL   Blood: x / Protein: 30 mg/dL / Nitrite: Negative   Leuk Esterase: Negative / RBC: 11 /HPF / WBC 1 /HPF   Sq Epi: x / Non Sq Epi: 1 /HPF / Bacteria: Negative      MICROBIOLOGY:  RECENT CULTURES:   @ 09:50 .Urine Clean Catch (Midstream)     No growth          RADIOLOGY REVIEWED:    < from: Xray Chest 1 View- PORTABLE-Urgent (08.15.20 @ 01:53) >    EXAM:  XR CHEST PORTABLE URGENT 1V                            PROCEDURE DATE:  08/15/2020            INTERPRETATION:  Indication: Shortness of breath, wheezing, hypoxia.    Technique: Single portable view of the chest.    Comparison: None.    Findings: The heart size cannot be adequately assessed on this single view. There is a probable small right pleural effusion. There is moderate pulmonary edema. No focal consolidation is seen.    Impression: Small right pleural effusion and moderate pulmonary edema.        < end of copied text >            Assessment:  V1 left zoster with eyeball involved, got 2 days iv acyclovir and developed kim now with pulmonary edema and some delirium which is better. She says she has blurry vision so need ophtho to resee to see if arn syndrome though not blind. zoster looks much better. trying to avoid high doses of antivirals with renal failure  Plan:  continue valtrex renal adjusted.   local eye care per ophtho, re eval today  renal to decide if and when dialyze,   np addressing pulmonary edema. lasix being given

## 2020-08-15 NOTE — BEHAVIORAL HEALTH ASSESSMENT NOTE - NSBHCONSULTRECOMMENDOTHER_PSY_A_CORE FT
check b12, folate, tesh  Check ekg prior to further doses of haldol to check ekg check b12, folate  Check ekg prior to further doses of haldol to check qtc

## 2020-08-15 NOTE — BEHAVIORAL HEALTH ASSESSMENT NOTE - NSBHCHARTREVIEWVS_PSY_A_CORE FT
ICU Vital Signs Last 24 Hrs  T(C): 36.3 (15 Aug 2020 05:07), Max: 36.6 (14 Aug 2020 22:01)  T(F): 97.4 (15 Aug 2020 05:07), Max: 97.8 (14 Aug 2020 22:01)  HR: 71 (15 Aug 2020 09:55) (71 - 90)  BP: 126/85 (15 Aug 2020 05:07) (126/85 - 153/79)  BP(mean): --  ABP: --  ABP(mean): --  RR: 24 (15 Aug 2020 06:06) (18 - 25)  SpO2: 94% (15 Aug 2020 09:55) (74% - 99%)

## 2020-08-15 NOTE — BEHAVIORAL HEALTH ASSESSMENT NOTE - NSBHCHARTREVIEWLAB_PSY_A_CORE FT
11.8   11.88 )-----------( 303      ( 15 Aug 2020 07:35 )             36.8   08-15    130<L>  |  98  |  57<H>  ----------------------------<  32<LL>  4.4   |  13<L>  |  7.16<H>    Ca    8.1<L>      15 Aug 2020 07:32

## 2020-08-15 NOTE — RAPID RESPONSE TEAM SUMMARY - NSSITUATIONBACKGROUNDRRT_GEN_ALL_CORE
The patient is a  woman with a pmhx of Insulin dependent DM, Hypothyroidism, HLD, HTN, neuropathy, Crohn's disease on immunosuppressants admitted for zoster opthalmicus course complicated by KAVIN with volume overload.     RRT called for hypoxic respiratory failure. The patient has been volume overloaded in the setting of acute renal failure likely secondary to ATN related to medication exposure. The patient was initially satting in the 70s was placed on BIPAP and improved to the 90s on 100% FiO2. /86, HR in the 90s and afebrile. The patient had a recent chest xray showing diffuse pulmonary edema and was given only 20 mg of lasix x1. The creatinine in the AM was 7.16 and was started on a bicarb drip at 50 cc/hr this PM. The patient was volume overloaded on exam with diffuse crackles and trace peripheral edema in the lower extremities.     Repeat chest xray at the [facility] showed worsening pulmonary edema. The patient was given 2 mg of IV Bumetanide with good urine output afterwards. ABG was done which showed a metabolic acidosis with a pH of 7.26 with appropriate decrease in PCO2. repeat CBC and BMP were sent as well.     Plan  - continue to monitor urine output.   - If the patient does not respond to bumex over the next two hours, will consult MICU for urgent dialysis in the setting of acute renal failure.   - If the patient puts out a good amount of urine over the next few hours with improvement in respiratory status, would continue with 2 mg of IV bumex BID vs. non-urgent dialysis   - Will follow up BMP.

## 2020-08-15 NOTE — BEHAVIORAL HEALTH ASSESSMENT NOTE - RISK ASSESSMENT
Unable to determine Suicide Risk Unable to assess secondary to patient being lethargic.   Will reassess when patient able to cooperate.  Son currently has no concerns for si

## 2020-08-15 NOTE — PROGRESS NOTE ADULT - SUBJECTIVE AND OBJECTIVE BOX
Patient confused, paranoid, sating well off Biapap.   Son Bed side. Psych eval.   ==================>> REVIEW OF SYSTEM <<=================    GEN: no fever, no chills, pain as above   RESP: no SOB, no cough, no sputum  CVS: no chest pain, no palpitations  GI: no abdominal pain, no nausea, no constipation, no diarrhea  : no dysuria, no hematuria  Neuro: no headache, no dizziness  Derm : no itching, no rash    ==================>> PHYSICAL EXAM <<=================    GEN: A&O X 2 , NAD , comfortable, a bit disoriented today   HEENT: PERRL, MMM, hearing intact, injected conjunctiva,  facial zoster starting to crust over on mid forehead are >> overall improved   Neck: supple , no JVD appreciated  CVS: S1S2 , regular , No M/R/G appreciated  PULM: CTA B/L,  no W/R/R appreciated  ABD.: soft. non tender, non distended,  bowel sounds present, obese   Extrem: intact pulses , no edema   PSYCH : Confused, Paranoid        MEDICATIONS  (STANDING):  amLODIPine   Tablet 5 milliGRAM(s) Oral daily  ATENolol  Tablet 50 milliGRAM(s) Oral daily  atorvastatin 40 milliGRAM(s) Oral at bedtime  brimonidine 0.2% Ophthalmic Solution 1 Drop(s) Left EYE three times a day  dextrose 5%. 1000 milliLiter(s) (50 mL/Hr) IV Continuous <Continuous>  dextrose 50% Injectable 12.5 Gram(s) IV Push once  dextrose 50% Injectable 25 Gram(s) IV Push once  dextrose 50% Injectable 25 Gram(s) IV Push once  dorzolamide 2%/timolol 0.5% Ophthalmic Solution 1 Drop(s) Left EYE two times a day  erythromycin   Ointment 1 Application(s) Left EYE four times a day  fenofibrate Tablet 145 milliGRAM(s) Oral daily  gabapentin 300 milliGRAM(s) Oral at bedtime  heparin   Injectable 5000 Unit(s) SubCutaneous every 8 hours  insulin glargine Injectable (LANTUS) 10 Unit(s) SubCutaneous at bedtime  insulin glargine Injectable (LANTUS) 10 Unit(s) SubCutaneous every morning  insulin lispro (HumaLOG) corrective regimen sliding scale   SubCutaneous three times a day before meals  levothyroxine 75 MICROGram(s) Oral daily  melatonin 3 milliGRAM(s) Oral at bedtime  mesalamine ER Capsule 500 milliGRAM(s) Oral two times a day  pantoprazole    Tablet 40 milliGRAM(s) Oral before breakfast  sodium bicarbonate 650 milliGRAM(s) Oral three times a day  sodium bicarbonate  Infusion 0.08 mEq/kG/Hr (50 mL/Hr) IV Continuous <Continuous>  valACYclovir 500 milliGRAM(s) Oral every 24 hours    MEDICATIONS  (PRN):  acetaminophen   Tablet .. 650 milliGRAM(s) Oral every 4 hours PRN Mild Pain (1 - 3)  dextrose 40% Gel 15 Gram(s) Oral once PRN Blood Glucose LESS THAN 70 milliGRAM(s)/deciliter  glucagon  Injectable 1 milliGRAM(s) IntraMuscular once PRN Glucose LESS THAN 70 milligrams/deciliter  haloperidol    Injectable 0.5 milliGRAM(s) IntraMuscular every 6 hours PRN agigtation    ___________  Active diet:  Diet, Regular:   Consistent Carbohydrate Evening Snack (CSTCHOSN)  Lacto-Ovo Veg (Accepts Milk Prod., Eggs)  ___________________  T(C): 36.4 (08-15-20 @ 13:52), Max: 36.4 (08-15-20 @ 13:52)  HR: 81 (08-15-20 @ 13:52) (69 - 81)  BP: 138/83 (08-15-20 @ 13:52) (138/83 - 138/83)  RR: --  SpO2: 97% (08-15-20 @ 13:52) (94% - 97%)      CAPILLARY BLOOD GLUCOSE      POCT Blood Glucose.: 140 mg/dL (15 Aug 2020 17:31)  POCT Blood Glucose.: 120 mg/dL (15 Aug 2020 11:54)  POCT Blood Glucose.: 134 mg/dL (15 Aug 2020 09:21)  POCT Blood Glucose.: 184 mg/dL (15 Aug 2020 09:07)  POCT Blood Glucose.: 39 mg/dL (15 Aug 2020 08:42)  POCT Blood Glucose.: 100 mg/dL (14 Aug 2020 21:49)       ==================>> LAB AND IMAGING <<=====

## 2020-08-15 NOTE — PROGRESS NOTE ADULT - SUBJECTIVE AND OBJECTIVE BOX
Dr. Ware  Office [phone]  Cell [phone]  Carolina GUEVARA  Cell [phone]      Patient is a 64y old  Female who presents with a chief complaint of facial pain (14 Aug 2020 21:35)      Patient seen and examined at bedside. No chest pain/sob    VITALS:  T(F): 97.5 (08-15-20 @ 13:52), Max: 97.8 (08-14-20 @ 22:01)  HR: 81 (08-15-20 @ 13:52)  BP: 138/83 (08-15-20 @ 13:52)  RR: 24 (08-15-20 @ 06:06)  SpO2: 97% (08-15-20 @ 13:52)  Wt(kg): --    08-14 @ 07:01  -  08-15 @ 07:00  --------------------------------------------------------  IN: 1530 mL / OUT: 1800 mL / NET: -270 mL    08-15 @ 07:01  -  08-15 @ 14:08  --------------------------------------------------------  IN: 0 mL / OUT: 800 mL / NET: -800 mL          PHYSICAL EXAM:  Constitutional: NAD  Neck: No JVD  Respiratory: Bilateral basal decreased air entry  Cardiovascular: S1, S2, RRR  Gastrointestinal: BS+, soft, NT/ND  Extremities: No peripheral edema    Hospital Medications:   MEDICATIONS  (STANDING):  amLODIPine   Tablet 5 milliGRAM(s) Oral daily  ATENolol  Tablet 50 milliGRAM(s) Oral daily  atorvastatin 40 milliGRAM(s) Oral at bedtime  brimonidine 0.2% Ophthalmic Solution 1 Drop(s) Left EYE three times a day  dextrose 5%. 1000 milliLiter(s) (50 mL/Hr) IV Continuous <Continuous>  dextrose 50% Injectable 12.5 Gram(s) IV Push once  dextrose 50% Injectable 25 Gram(s) IV Push once  dextrose 50% Injectable 25 Gram(s) IV Push once  dorzolamide 2%/timolol 0.5% Ophthalmic Solution 1 Drop(s) Left EYE two times a day  erythromycin   Ointment 1 Application(s) Left EYE four times a day  fenofibrate Tablet 145 milliGRAM(s) Oral daily  gabapentin 300 milliGRAM(s) Oral at bedtime  heparin   Injectable 5000 Unit(s) SubCutaneous every 8 hours  insulin glargine Injectable (LANTUS) 10 Unit(s) SubCutaneous at bedtime  insulin glargine Injectable (LANTUS) 10 Unit(s) SubCutaneous at bedtime  insulin lispro (HumaLOG) corrective regimen sliding scale   SubCutaneous three times a day before meals  levothyroxine 75 MICROGram(s) Oral daily  melatonin 3 milliGRAM(s) Oral at bedtime  mesalamine ER Capsule 500 milliGRAM(s) Oral two times a day  pantoprazole    Tablet 40 milliGRAM(s) Oral before breakfast  sodium bicarbonate  Infusion 0.08 mEq/kG/Hr (50 mL/Hr) IV Continuous <Continuous>  valACYclovir 500 milliGRAM(s) Oral every 24 hours      LABS:  08-15    130<L>  |  98  |  57<H>  ----------------------------<  32<LL>  4.4   |  13<L>  |  7.16<H>    Ca    8.1<L>      15 Aug 2020 07:32      Creatinine Trend: 7.16 <--, 6.35 <--, 5.24 <--, 4.32 <--, 0.85 <--, 0.72 <--                                11.8   11.88 )-----------( 303      ( 15 Aug 2020 07:35 )             36.8     Urine Studies:  Urinalysis - [08-14-20 @ 09:20]      Color Light Yellow / Appearance Clear / SG 1.006 / pH 6.5      Gluc Negative / Ketone Negative  / Bili Negative / Urobili <2 mg/dL       Blood Negative / Protein 30 mg/dL / Leuk Est Negative / Nitrite Negative      RBC 11 / WBC 1 / Hyaline 0 / Gran  / Sq Epi  / Non Sq Epi 1 / Bacteria Negative    Urine Creatinine 25      [08-15-20 @ 12:37]  Urine Sodium 79      [08-15-20 @ 12:37]  Urine Chloride <35      [08-14-20 @ 07:24]  Urine Osmolality 147      [08-14-20 @ 09:20]    TSH 2.58      [08-12-20 @ 12:03]  Lipid: chol 149, , HDL 38, LDL 77      [08-12-20 @ 12:15]    HCV 0.11, Nonreact      [08-13-20 @ 09:04]      RADIOLOGY & ADDITIONAL STUDIES:

## 2020-08-15 NOTE — BEHAVIORAL HEALTH ASSESSMENT NOTE - SUICIDE PROTECTIVE FACTORS
Cultural, spiritual and/or moral attitudes against suicide/Identifies reasons for living/Supportive social network of family or friends/Responsibility to family and others/Fear of death or the actual act of killing self

## 2020-08-15 NOTE — BEHAVIORAL HEALTH ASSESSMENT NOTE - NSBHCONSULTMEDS_PSY_A_CORE FT
will hold off on standing antipsychotics for now until patient can be assessed further and also since there is no ekg documented in the patients medical record.  However, if receiving multiple prns for agitation will consider standing medications    avoid use of opoids, anticholingerics, benzos, hypnotics, or other meds which could cause confusion    Frequent reorientation    Attempt to keep patient up during the day with lights open, shades open will hold off on standing antipsychotics for now until patient can be assessed further and also since there is no ekg documented in the patients medical record.  However, if receiving multiple prns for agitation will consider standing medications    avoid use of opoids, anticholinergics, benzos, hypnotics, or other meds which could cause confusion    Frequent reorientation    Attempt to keep patient up during the day with lights open, shades open

## 2020-08-16 LAB
ANION GAP SERPL CALC-SCNC: 18 MMOL/L — HIGH (ref 5–17)
BUN SERPL-MCNC: 63 MG/DL — HIGH (ref 7–23)
CALCIUM SERPL-MCNC: 8.1 MG/DL — LOW (ref 8.4–10.5)
CHLORIDE SERPL-SCNC: 97 MMOL/L — SIGNIFICANT CHANGE UP (ref 96–108)
CO2 SERPL-SCNC: 18 MMOL/L — LOW (ref 22–31)
CREAT SERPL-MCNC: 6.8 MG/DL — HIGH (ref 0.5–1.3)
GLUCOSE BLDC GLUCOMTR-MCNC: 159 MG/DL — HIGH (ref 70–99)
GLUCOSE BLDC GLUCOMTR-MCNC: 204 MG/DL — HIGH (ref 70–99)
GLUCOSE BLDC GLUCOMTR-MCNC: 223 MG/DL — HIGH (ref 70–99)
GLUCOSE BLDC GLUCOMTR-MCNC: 234 MG/DL — HIGH (ref 70–99)
GLUCOSE SERPL-MCNC: 167 MG/DL — HIGH (ref 70–99)
HCT VFR BLD CALC: 33.6 % — LOW (ref 34.5–45)
HGB BLD-MCNC: 10.8 G/DL — LOW (ref 11.5–15.5)
MCHC RBC-ENTMCNC: 27 PG — SIGNIFICANT CHANGE UP (ref 27–34)
MCHC RBC-ENTMCNC: 32.1 GM/DL — SIGNIFICANT CHANGE UP (ref 32–36)
MCV RBC AUTO: 84 FL — SIGNIFICANT CHANGE UP (ref 80–100)
NRBC # BLD: 0 /100 WBCS — SIGNIFICANT CHANGE UP (ref 0–0)
PLATELET # BLD AUTO: 358 K/UL — SIGNIFICANT CHANGE UP (ref 150–400)
POTASSIUM SERPL-MCNC: 4 MMOL/L — SIGNIFICANT CHANGE UP (ref 3.5–5.3)
POTASSIUM SERPL-SCNC: 4 MMOL/L — SIGNIFICANT CHANGE UP (ref 3.5–5.3)
RBC # BLD: 4 M/UL — SIGNIFICANT CHANGE UP (ref 3.8–5.2)
RBC # FLD: 15.1 % — HIGH (ref 10.3–14.5)
SODIUM SERPL-SCNC: 133 MMOL/L — LOW (ref 135–145)
TSH SERPL-MCNC: 1.29 UIU/ML — SIGNIFICANT CHANGE UP (ref 0.27–4.2)
VIT B12 SERPL-MCNC: 1023 PG/ML — SIGNIFICANT CHANGE UP (ref 232–1245)
WBC # BLD: 12.36 K/UL — HIGH (ref 3.8–10.5)
WBC # FLD AUTO: 12.36 K/UL — HIGH (ref 3.8–10.5)

## 2020-08-16 PROCEDURE — 71250 CT THORAX DX C-: CPT | Mod: 26

## 2020-08-16 RX ORDER — BUMETANIDE 0.25 MG/ML
2 INJECTION INTRAMUSCULAR; INTRAVENOUS ONCE
Refills: 0 | Status: COMPLETED | OUTPATIENT
Start: 2020-08-16 | End: 2020-08-16

## 2020-08-16 RX ADMIN — BRIMONIDINE TARTRATE 1 DROP(S): 2 SOLUTION/ DROPS OPHTHALMIC at 05:56

## 2020-08-16 RX ADMIN — Medication 50 MEQ/KG/HR: at 21:46

## 2020-08-16 RX ADMIN — Medication 75 MICROGRAM(S): at 05:57

## 2020-08-16 RX ADMIN — Medication 2: at 12:19

## 2020-08-16 RX ADMIN — Medication 1: at 08:51

## 2020-08-16 RX ADMIN — GABAPENTIN 300 MILLIGRAM(S): 400 CAPSULE ORAL at 21:41

## 2020-08-16 RX ADMIN — ATORVASTATIN CALCIUM 40 MILLIGRAM(S): 80 TABLET, FILM COATED ORAL at 21:41

## 2020-08-16 RX ADMIN — HEPARIN SODIUM 5000 UNIT(S): 5000 INJECTION INTRAVENOUS; SUBCUTANEOUS at 05:58

## 2020-08-16 RX ADMIN — BRIMONIDINE TARTRATE 1 DROP(S): 2 SOLUTION/ DROPS OPHTHALMIC at 11:43

## 2020-08-16 RX ADMIN — ATENOLOL 50 MILLIGRAM(S): 25 TABLET ORAL at 05:57

## 2020-08-16 RX ADMIN — DORZOLAMIDE HYDROCHLORIDE TIMOLOL MALEATE 1 DROP(S): 20; 5 SOLUTION/ DROPS OPHTHALMIC at 05:56

## 2020-08-16 RX ADMIN — HEPARIN SODIUM 5000 UNIT(S): 5000 INJECTION INTRAVENOUS; SUBCUTANEOUS at 21:43

## 2020-08-16 RX ADMIN — Medication 145 MILLIGRAM(S): at 11:42

## 2020-08-16 RX ADMIN — BRIMONIDINE TARTRATE 1 DROP(S): 2 SOLUTION/ DROPS OPHTHALMIC at 21:42

## 2020-08-16 RX ADMIN — Medication 650 MILLIGRAM(S): at 21:42

## 2020-08-16 RX ADMIN — BUMETANIDE 2 MILLIGRAM(S): 0.25 INJECTION INTRAMUSCULAR; INTRAVENOUS at 17:14

## 2020-08-16 RX ADMIN — INSULIN GLARGINE 10 UNIT(S): 100 INJECTION, SOLUTION SUBCUTANEOUS at 08:51

## 2020-08-16 RX ADMIN — AMLODIPINE BESYLATE 5 MILLIGRAM(S): 2.5 TABLET ORAL at 05:58

## 2020-08-16 RX ADMIN — Medication 500 MILLIGRAM(S): at 05:58

## 2020-08-16 RX ADMIN — Medication 3 MILLIGRAM(S): at 21:42

## 2020-08-16 RX ADMIN — Medication 2: at 17:16

## 2020-08-16 RX ADMIN — DORZOLAMIDE HYDROCHLORIDE TIMOLOL MALEATE 1 DROP(S): 20; 5 SOLUTION/ DROPS OPHTHALMIC at 17:15

## 2020-08-16 RX ADMIN — Medication 650 MILLIGRAM(S): at 11:42

## 2020-08-16 RX ADMIN — Medication 1 APPLICATION(S): at 11:42

## 2020-08-16 RX ADMIN — Medication 650 MILLIGRAM(S): at 05:57

## 2020-08-16 RX ADMIN — HEPARIN SODIUM 5000 UNIT(S): 5000 INJECTION INTRAVENOUS; SUBCUTANEOUS at 11:42

## 2020-08-16 RX ADMIN — INSULIN GLARGINE 10 UNIT(S): 100 INJECTION, SOLUTION SUBCUTANEOUS at 21:43

## 2020-08-16 RX ADMIN — Medication 1 APPLICATION(S): at 23:40

## 2020-08-16 RX ADMIN — VALACYCLOVIR 500 MILLIGRAM(S): 500 TABLET, FILM COATED ORAL at 21:41

## 2020-08-16 RX ADMIN — PANTOPRAZOLE SODIUM 40 MILLIGRAM(S): 20 TABLET, DELAYED RELEASE ORAL at 05:58

## 2020-08-16 RX ADMIN — Medication 1 APPLICATION(S): at 05:57

## 2020-08-16 RX ADMIN — Medication 1 APPLICATION(S): at 17:15

## 2020-08-16 RX ADMIN — Medication 500 MILLIGRAM(S): at 17:15

## 2020-08-16 NOTE — PROGRESS NOTE ADULT - SUBJECTIVE AND OBJECTIVE BOX
Dr. Ware  Office [phone]  Cell [phone]  Carolina GUEVARA  Cell [phone]      Patient is a 64y old  Female who presents with a chief complaint of facial pain (16 Aug 2020 12:58)      Patient seen and examined at bedside. No chest pain/sob    VITALS:  T(F): 98.2 (08-16-20 @ 13:46), Max: 99 (08-16-20 @ 00:14)  HR: 76 (08-16-20 @ 13:46)  BP: 125/84 (08-16-20 @ 13:46)  RR: 24 (08-16-20 @ 04:26)  SpO2: 94% (08-16-20 @ 13:46)  Wt(kg): --    08-15 @ 07:01  -  08-16 @ 07:00  --------------------------------------------------------  IN: 1080 mL / OUT: 2220 mL / NET: -1140 mL    08-16 @ 07:01  -  08-16 @ 15:31  --------------------------------------------------------  IN: 0 mL / OUT: 900 mL / NET: -900 mL          PHYSICAL EXAM:  Constitutional: NAD  Neck: No JVD  Respiratory: Bilateral decreased air entry  Cardiovascular: S1, S2, RRR  Gastrointestinal: BS+, soft, NT/ND  Extremities: No peripheral edema    Hospital Medications:   MEDICATIONS  (STANDING):  amLODIPine   Tablet 5 milliGRAM(s) Oral daily  ATENolol  Tablet 50 milliGRAM(s) Oral daily  atorvastatin 40 milliGRAM(s) Oral at bedtime  brimonidine 0.2% Ophthalmic Solution 1 Drop(s) Left EYE three times a day  buMETAnide Injectable 2 milliGRAM(s) IV Push once  dextrose 5%. 1000 milliLiter(s) (50 mL/Hr) IV Continuous <Continuous>  dextrose 50% Injectable 12.5 Gram(s) IV Push once  dextrose 50% Injectable 25 Gram(s) IV Push once  dextrose 50% Injectable 25 Gram(s) IV Push once  dorzolamide 2%/timolol 0.5% Ophthalmic Solution 1 Drop(s) Left EYE two times a day  erythromycin   Ointment 1 Application(s) Left EYE four times a day  fenofibrate Tablet 145 milliGRAM(s) Oral daily  furosemide   Injectable 20 milliGRAM(s) IV Push once  gabapentin 300 milliGRAM(s) Oral at bedtime  heparin   Injectable 5000 Unit(s) SubCutaneous every 8 hours  insulin glargine Injectable (LANTUS) 10 Unit(s) SubCutaneous at bedtime  insulin glargine Injectable (LANTUS) 10 Unit(s) SubCutaneous every morning  insulin lispro (HumaLOG) corrective regimen sliding scale   SubCutaneous three times a day before meals  levothyroxine 75 MICROGram(s) Oral daily  melatonin 3 milliGRAM(s) Oral at bedtime  mesalamine ER Capsule 500 milliGRAM(s) Oral two times a day  pantoprazole    Tablet 40 milliGRAM(s) Oral before breakfast  sodium bicarbonate 650 milliGRAM(s) Oral three times a day  sodium bicarbonate  Infusion 0.08 mEq/kG/Hr (50 mL/Hr) IV Continuous <Continuous>  valACYclovir 500 milliGRAM(s) Oral every 24 hours      LABS:  08-16    133<L>  |  97  |  63<H>  ----------------------------<  167<H>  4.0   |  18<L>  |  6.80<H>    Ca    8.1<L>      16 Aug 2020 06:48  Phos  7.0     08-15  Mg     1.7     08-15    TPro  6.7  /  Alb  3.0<L>  /  TBili  0.6  /  DBili      /  AST  45<H>  /  ALT  23  /  AlkPhos  75  08-15    Creatinine Trend: 6.80 <--, 6.61 <--, 7.16 <--, 6.35 <--, 5.24 <--, 4.32 <--, 0.85 <--, 0.72 <--    Albumin, Serum: 3.0 g/dL (08-15 @ 21:03)  Phosphorus Level, Serum: 7.0 mg/dL (08-15 @ 21:03)                              10.8   12.36 )-----------( 358      ( 16 Aug 2020 06:48 )             33.6     Urine Studies:  Urinalysis - [08-14-20 @ 09:20]      Color Light Yellow / Appearance Clear / SG 1.006 / pH 6.5      Gluc Negative / Ketone Negative  / Bili Negative / Urobili <2 mg/dL       Blood Negative / Protein 30 mg/dL / Leuk Est Negative / Nitrite Negative      RBC 11 / WBC 1 / Hyaline 0 / Gran  / Sq Epi  / Non Sq Epi 1 / Bacteria Negative    Urine Creatinine 25      [08-15-20 @ 12:37]  Urine Sodium 79      [08-15-20 @ 12:37]  Urine Chloride <35      [08-14-20 @ 07:24]  Urine Osmolality 147      [08-14-20 @ 09:20]    TSH 1.29      [08-16-20 @ 09:36]  Lipid: chol 149, , HDL 38, LDL 77      [08-12-20 @ 12:15]    HCV 0.11, Nonreact      [08-13-20 @ 09:04]      RADIOLOGY & ADDITIONAL STUDIES:

## 2020-08-16 NOTE — PROGRESS NOTE ADULT - SUBJECTIVE AND OBJECTIVE BOX
CC: f/u for V1 zoster    Patient reports  she is much better   REVIEW OF SYSTEMS:  All other review of systems negative (Comprehensive ROS)    Antimicrobials Day #  :day 6/7-10   valACYclovir 500 milliGRAM(s) Oral every 24 hours    Other Medications Reviewed    T(F): 98.2 (08-16-20 @ 13:46), Max: 99 (08-16-20 @ 00:14)  HR: 77 (08-16-20 @ 15:42)  BP: 125/84 (08-16-20 @ 13:46)  RR: 24 (08-16-20 @ 04:26)  SpO2: 93% (08-16-20 @ 15:42)  Wt(kg): --    PHYSICAL EXAM:  General: alert, no acute distress  Eyes:  anicteric, mild left  conjunctival injection, no discharge vesicles all crusted  Oropharynx: no lesions or injection 	  Neck: supple, without adenopathy  Lungs: basilar rales to auscultation  Heart: regular rate and rhythm; no murmur, rubs or gallops  Abdomen: soft, nondistended, nontender, without mass or organomegaly  Skin: no lesions  Extremities: no clubbing, cyanosis, or edema  Neurologic: alert, oriented, moves all extremities    LAB RESULTS:                        10.8   12.36 )-----------( 358      ( 16 Aug 2020 06:48 )             33.6     08-16    133<L>  |  97  |  63<H>  ----------------------------<  167<H>  4.0   |  18<L>  |  6.80<H>    Ca    8.1<L>      16 Aug 2020 06:48  Phos  7.0     08-15  Mg     1.7     08-15    TPro  6.7  /  Alb  3.0<L>  /  TBili  0.6  /  DBili  x   /  AST  45<H>  /  ALT  23  /  AlkPhos  75  08-15    LIVER FUNCTIONS - ( 15 Aug 2020 21:03 )  Alb: 3.0 g/dL / Pro: 6.7 g/dL / ALK PHOS: 75 U/L / ALT: 23 U/L / AST: 45 U/L / GGT: x             MICROBIOLOGY:  RECENT CULTURES:  08-14 @ 09:50 .Urine Clean Catch (Midstream)     No growth          RADIOLOGY REVIEWED:    < from: CT Chest No Cont (08.16.20 @ 09:11) >    EXAM:  CT CHEST                            PROCEDURE DATE:  08/16/2020            INTERPRETATION:  CLINICAL INFORMATION: Hypoxia.    COMPARISON: Chest radiograph 8/15/2020.    PROCEDURE:  CT of the Chest was performed without intravenous contrast.  Sagittal and coronal reformats were performed.    FINDINGS:    LUNGS AND AIRWAYS: Patent central airways.  Bilateral patchy groundglass and dense opacities with interlobular septal thickening.  PLEURA: Bilateral small pleural effusions.  MEDIASTINUM AND KEN: No lymphadenopathy.  VESSELS: Within normal limits.  HEART: Heart size is enlarged. No pericardial effusion.  CHEST WALL AND LOWER NECK: Within normal limits.  VISUALIZED UPPER ABDOMEN: Within normal limits.  BONES: Degenerative changes.    IMPRESSION:  Findings suggestive of pulmonary edema.    Bilateral small pleural effusions.    < end of copied text >            Assessment:  patient with V1 zoster, early keratitis, got a couple of days of iv acyclovir then severe kim multifactorial-ace, nsaid, acyclovir, retention, now a bit better. Pulmonary edema with aggressive hydration and poor output is better.   Plan:  4 more days of valtrex  ophtho f/u appreciated, drops as ordered

## 2020-08-16 NOTE — PROGRESS NOTE ADULT - ASSESSMENT
_________________________________________________________________________________________  ========>>  M E D I C A L   A T T E N D I N G    F O L L O W  U P  N O T E  <<=========  -----------------------------------------------------------------------------------------------------    - Patient seen and examined by me earlier today.   - In summary,  COLBY SAAB is a 64y year old woman who originally presented with facial pain       noted no major events reported / noted otherwise yesterday and last night, pt with respiratory distress / failure / hypoxia, post Bipap, off today   - Patient today overall doing better comfortable, eating poorly / poor appetite,       ==================>> REVIEW OF SYSTEM <<=================    GEN: no fever, no chills, pain improved overall , feels tired and sleepy   RESP: no SOB, no cough, no sputum  CVS: no chest pain, no palpitations  GI: no abdominal pain, no nausea  : no issues with forbes today  Neuro: no headache, no dizziness  Derm : no itching, no rash    ==================>> PHYSICAL EXAM <<=================    GEN: A&O X 3 , NAD , comfortable, MS has improved   HEENT: PERRL, MMM, hearing intact, injected conjunctiva,  facial zoster starting crusted over >> overall improved   Neck: supple , no JVD appreciated  CVS: S1S2 , regular , No M/R/G appreciated  PULM: CTA B/L,  no W/R/R appreciated  ABD.: soft. non tender, non distended,  bowel sounds present, obese   Extrem: intact pulses , no edema      + forbes with good urine output   PSYCH : normal mood,  not anxious      ==================>> MEDICATIONS <<====================    amLODIPine   Tablet 5 milliGRAM(s) Oral daily  ATENolol  Tablet 50 milliGRAM(s) Oral daily  atorvastatin 40 milliGRAM(s) Oral at bedtime  brimonidine 0.2% Ophthalmic Solution 1 Drop(s) Left EYE three times a day  dextrose 5%. 1000 milliLiter(s) IV Continuous <Continuous>  dextrose 50% Injectable 12.5 Gram(s) IV Push once  dextrose 50% Injectable 25 Gram(s) IV Push once  dextrose 50% Injectable 25 Gram(s) IV Push once  dorzolamide 2%/timolol 0.5% Ophthalmic Solution 1 Drop(s) Left EYE two times a day  erythromycin   Ointment 1 Application(s) Left EYE four times a day  fenofibrate Tablet 145 milliGRAM(s) Oral daily  furosemide   Injectable 20 milliGRAM(s) IV Push once  gabapentin 300 milliGRAM(s) Oral at bedtime  heparin   Injectable 5000 Unit(s) SubCutaneous every 8 hours  insulin glargine Injectable (LANTUS) 10 Unit(s) SubCutaneous at bedtime  insulin glargine Injectable (LANTUS) 10 Unit(s) SubCutaneous every morning  insulin lispro (HumaLOG) corrective regimen sliding scale   SubCutaneous three times a day before meals  levothyroxine 75 MICROGram(s) Oral daily  melatonin 3 milliGRAM(s) Oral at bedtime  mesalamine ER Capsule 500 milliGRAM(s) Oral two times a day  pantoprazole    Tablet 40 milliGRAM(s) Oral before breakfast  sodium bicarbonate 650 milliGRAM(s) Oral three times a day  sodium bicarbonate  Infusion 0.08 mEq/kG/Hr IV Continuous <Continuous>  valACYclovir 500 milliGRAM(s) Oral every 24 hours    MEDICATIONS  (PRN):  acetaminophen   Tablet .. 650 milliGRAM(s) Oral every 4 hours PRN Mild Pain (1 - 3)  dextrose 40% Gel 15 Gram(s) Oral once PRN Blood Glucose LESS THAN 70 milliGRAM(s)/deciliter  glucagon  Injectable 1 milliGRAM(s) IntraMuscular once PRN Glucose LESS THAN 70 milligrams/deciliter  haloperidol    Injectable 0.5 milliGRAM(s) IntraMuscular every 6 hours PRN agigtation    ___________  Active diet:  Diet, Regular:   Consistent Carbohydrate Evening Snack (CSTCHOSN)  Lacto-Ovo Veg (Accepts Milk Prod., Eggs)  ___________________    ==================>> VITAL SIGNS <<==================    Vital Signs Last 24 HrsT(C): 36.9 (08-16-20 @ 04:26)  T(F): 98.5 (08-16-20 @ 04:26), Max: 99 (08-16-20 @ 00:14)  HR: 71 (08-16-20 @ 10:27) (69 - 99)  BP: 112/77 (08-16-20 @ 04:26)  RR: 24 (08-16-20 @ 04:26) (24 - 24)  SpO2: 96% (08-16-20 @ 10:27) (87% - 100%)      POCT Blood Glucose.: 223 mg/dL (16 Aug 2020 12:05)  POCT Blood Glucose.: 159 mg/dL (16 Aug 2020 08:34)  POCT Blood Glucose.: 246 mg/dL (15 Aug 2020 20:41)  POCT Blood Glucose.: 140 mg/dL (15 Aug 2020 17:31)     ==================>> LAB AND IMAGING <<==================                        10.8   12.36 )-----------( 358      ( 16 Aug 2020 06:48 )             33.6        08-16    133<L>  |  97  |  63<H>  ----------------------------<  167<H>  4.0   |  18<L>  |  6.80<H>    Ca    8.1<L>      16 Aug 2020 06:48  Phos  7.0     08-15  Mg     1.7     08-15    TPro  6.7  /  Alb  3.0<L>  /  TBili  0.6  /  DBili  x   /  AST  45<H>  /  ALT  23  /  AlkPhos  75  08-15    WBC count:   12.36 <<== ,  13.67 <<== ,  11.88 <<== ,  9.84 <<==   Hemoglobin:   10.8 <<==,  13.1 <<==,  11.8 <<==,  12.2 <<==  platelets:  358 <==, 375 <==, 303 <==, 348 <==, 389 <==    Creatinine:  6.80  <<==, 6.61  <<==, 7.16  <<==, 6.35  <<==, 5.24  <<==, 4.32  <<==  Sodium:   133  <==, 130  <==, 130  <==, 127  <==, 126  <==, 129  <==, 133  <==       AST:          45 <== , 36 <==      ALT:        23  <== , 27  <==      AP:        75  <=, 54  <=     Bili:        0.6  <=, 0.4  <=    < from: US Kidney and Bladder (08.14.20 @ 13:16) >  IMPRESSION:  Normal renal ultrasound.  < end of copied text >    ___________________________________________________________________________________  ===============>>  A S S E S S M E N T   A N D   P L A N <<===============  ------------------------------------------------------------------------------------------    · Assessment		  65 yo woman with a pmhx of Insulin dependent DM, Hypothyroidism, HLD, HTN, neuropathy, Crohn's disease on immunosuppressants present to the ED with eye redness and pain, and rash that started about 5 days prior       Problem/Plan - :  ·  Problem: Acute Hypoxic Respiratory Failure   - Patient was placed on Bipap last night. Cxr showed pulmonary edema.   post diuretics and Bipap wit good results  now back on nasal canula   monitor  clinically closely  if worsens, ma need dialysis     Problem/Plan - :  ·  Problem: KAVIN  possibly due to Acyclovir and urinary retention   pt likely with baseline diabetic nephropathy  held Acyclovir  AVOID all NSAID use  held ACEI  Avoid nephrotoxic medications.   nephrology appreciated   sono as noted above / no hydro   monitor BMP  Forbes : monitor output   may need short term HD ?     Problem/Plan - 1:  ·  Problem: Zoster ophthalmicus, facial shingles, conjunctivitis..   overall improved  ophtho and ID following, appreciated  on valacyclovir now   ointment and drops as ordered / adjusted   supportive care  pain mgmt.     Problem/Plan - 2:  ·  Problem: less likely Facial cellulitis  ID appreciated   monitor off systemic abx  erythromycin  Ointment as above     Problem/Plan - 3:  ·  Problem: Type 2 diabetes mellitus   lantus decreased due to hypoglycemia   RISS  monitor FS and adjust meds as needed for better glycemic control   A1C 7.5  Neurontin for diabetic neuropathy.     Problem/Plan - 4:  ·  Problem: Essential hypertension.  Plan: Continue Current medications and monitor.     Problem/Plan - 5:  ·  Problem: Hyperlipidemia, unspecified hyperlipidemia type.  Plan: Continue Current medications and monitor.     Problem/Plan - 6:  Problem: Class 3 severe obesity due to excess calories with serious comorbidity and body mass index (BMI) of 40.0 to 44.9 in adult. Plan: nutrition consult  pt should get a sleep study as OP     Problem/Plan - 7:  ·  Problem: Crohn's disease with complication, unspecified gastrointestinal tract location.  Plan: meds reviewed and reconciled with olivia  Balsalazide not formulary > changed to Mesalamine   monitor.     Problem/Plan - 8:  ·  Problem: Need for prophylactic measure.    heparin   Protonix.     --------------------------------------------  Case discussed with pt, NP, ID//   Education given on findings and plan of care  ___________________________  H. JOÃO Forman.  Pager: [phone]

## 2020-08-16 NOTE — PROGRESS NOTE ADULT - ASSESSMENT
65 yo woman with a pmhx of Insulin dependent DM, Hypothyroidism, HLD, HTN, neuropathy, Crohn's disease on immunosuppressants admitted for zoster opthalmicus found to have KIERA    Kiera   scr 0.8 on 8/12  Scr continues to worsen today--   KIERA multifactorial -- sec to hyperglycmia, ACE, Ketorolac, Acyclovir and rentention   s/p bladder scan with 700cc urine --s/p forbes  continue to HOLD lisinopril (last dose on 8/13)  Optimize glucose control  AVOID all NSAID use (s/p ketorolac on 8/11)  Acyclovir on hold,- -last dose on 8/12  FENa >1 suggest ATN, Renal US is unremarkable  Urine has minimal protein, RBC + but has forbes  Has been hypoglycemia, may be sec to worsening of renal function  Changed IVF D5 with NaHCO3 150meq/L 50cc/hr  urine output has improved  Monitor at present  Use bumex 2mg IV one dose and PRN in view of pulmonary edema in CT chest  No emergent indication for RRT at present  Consent obtained for RRT if needed  Monitor BMP   AVoid further nephrotoxics, NSAIDS RCA  * Discussed with patient and son at length    Acidosis:  AG + Non-AG  Started NaHCO3 650mg TID  Improving    HTN  BP stable  Hold lisinopril  Monitor BP  Increase amlodipine to 10mg If needed  Low salt diet       Hyponatremia  etiology? hyperglycemia contributing  Work up suggest polydipsia/liquid diet intake  Monitor at present

## 2020-08-17 LAB
ANION GAP SERPL CALC-SCNC: 17 MMOL/L — SIGNIFICANT CHANGE UP (ref 5–17)
BASOPHILS # BLD AUTO: 0.05 K/UL — SIGNIFICANT CHANGE UP (ref 0–0.2)
BASOPHILS NFR BLD AUTO: 0.5 % — SIGNIFICANT CHANGE UP (ref 0–2)
BUN SERPL-MCNC: 61 MG/DL — HIGH (ref 7–23)
CALCIUM SERPL-MCNC: 8.4 MG/DL — SIGNIFICANT CHANGE UP (ref 8.4–10.5)
CHLORIDE SERPL-SCNC: 95 MMOL/L — LOW (ref 96–108)
CO2 SERPL-SCNC: 24 MMOL/L — SIGNIFICANT CHANGE UP (ref 22–31)
CREAT SERPL-MCNC: 5.71 MG/DL — HIGH (ref 0.5–1.3)
EOSINOPHIL # BLD AUTO: 0.46 K/UL — SIGNIFICANT CHANGE UP (ref 0–0.5)
EOSINOPHIL NFR BLD AUTO: 4.7 % — SIGNIFICANT CHANGE UP (ref 0–6)
GLUCOSE BLDC GLUCOMTR-MCNC: 150 MG/DL — HIGH (ref 70–99)
GLUCOSE BLDC GLUCOMTR-MCNC: 174 MG/DL — HIGH (ref 70–99)
GLUCOSE BLDC GLUCOMTR-MCNC: 222 MG/DL — HIGH (ref 70–99)
GLUCOSE BLDC GLUCOMTR-MCNC: 225 MG/DL — HIGH (ref 70–99)
GLUCOSE SERPL-MCNC: 143 MG/DL — HIGH (ref 70–99)
HCT VFR BLD CALC: 34.2 % — LOW (ref 34.5–45)
HGB BLD-MCNC: 11.6 G/DL — SIGNIFICANT CHANGE UP (ref 11.5–15.5)
IMM GRANULOCYTES NFR BLD AUTO: 0.4 % — SIGNIFICANT CHANGE UP (ref 0–1.5)
LYMPHOCYTES # BLD AUTO: 3.49 K/UL — HIGH (ref 1–3.3)
LYMPHOCYTES # BLD AUTO: 35.6 % — SIGNIFICANT CHANGE UP (ref 13–44)
MCHC RBC-ENTMCNC: 27.6 PG — SIGNIFICANT CHANGE UP (ref 27–34)
MCHC RBC-ENTMCNC: 33.9 GM/DL — SIGNIFICANT CHANGE UP (ref 32–36)
MCV RBC AUTO: 81.4 FL — SIGNIFICANT CHANGE UP (ref 80–100)
MONOCYTES # BLD AUTO: 0.85 K/UL — SIGNIFICANT CHANGE UP (ref 0–0.9)
MONOCYTES NFR BLD AUTO: 8.7 % — SIGNIFICANT CHANGE UP (ref 2–14)
NEUTROPHILS # BLD AUTO: 4.91 K/UL — SIGNIFICANT CHANGE UP (ref 1.8–7.4)
NEUTROPHILS NFR BLD AUTO: 50.1 % — SIGNIFICANT CHANGE UP (ref 43–77)
NRBC # BLD: 0 /100 WBCS — SIGNIFICANT CHANGE UP (ref 0–0)
PLATELET # BLD AUTO: 434 K/UL — HIGH (ref 150–400)
POTASSIUM SERPL-MCNC: 3.7 MMOL/L — SIGNIFICANT CHANGE UP (ref 3.5–5.3)
POTASSIUM SERPL-SCNC: 3.7 MMOL/L — SIGNIFICANT CHANGE UP (ref 3.5–5.3)
RBC # BLD: 4.2 M/UL — SIGNIFICANT CHANGE UP (ref 3.8–5.2)
RBC # FLD: 15.3 % — HIGH (ref 10.3–14.5)
SODIUM SERPL-SCNC: 136 MMOL/L — SIGNIFICANT CHANGE UP (ref 135–145)
WBC # BLD: 9.8 K/UL — SIGNIFICANT CHANGE UP (ref 3.8–10.5)
WBC # FLD AUTO: 9.8 K/UL — SIGNIFICANT CHANGE UP (ref 3.8–10.5)

## 2020-08-17 PROCEDURE — 99232 SBSQ HOSP IP/OBS MODERATE 35: CPT

## 2020-08-17 RX ADMIN — ATORVASTATIN CALCIUM 40 MILLIGRAM(S): 80 TABLET, FILM COATED ORAL at 22:47

## 2020-08-17 RX ADMIN — Medication 650 MILLIGRAM(S): at 04:18

## 2020-08-17 RX ADMIN — Medication 2: at 12:30

## 2020-08-17 RX ADMIN — VALACYCLOVIR 500 MILLIGRAM(S): 500 TABLET, FILM COATED ORAL at 22:39

## 2020-08-17 RX ADMIN — PANTOPRAZOLE SODIUM 40 MILLIGRAM(S): 20 TABLET, DELAYED RELEASE ORAL at 05:20

## 2020-08-17 RX ADMIN — Medication 500 MILLIGRAM(S): at 17:41

## 2020-08-17 RX ADMIN — HEPARIN SODIUM 5000 UNIT(S): 5000 INJECTION INTRAVENOUS; SUBCUTANEOUS at 05:22

## 2020-08-17 RX ADMIN — INSULIN GLARGINE 10 UNIT(S): 100 INJECTION, SOLUTION SUBCUTANEOUS at 22:45

## 2020-08-17 RX ADMIN — BRIMONIDINE TARTRATE 1 DROP(S): 2 SOLUTION/ DROPS OPHTHALMIC at 12:21

## 2020-08-17 RX ADMIN — Medication 650 MILLIGRAM(S): at 22:39

## 2020-08-17 RX ADMIN — ATENOLOL 50 MILLIGRAM(S): 25 TABLET ORAL at 05:20

## 2020-08-17 RX ADMIN — Medication 500 MILLIGRAM(S): at 05:20

## 2020-08-17 RX ADMIN — Medication 1 APPLICATION(S): at 17:40

## 2020-08-17 RX ADMIN — Medication 650 MILLIGRAM(S): at 05:03

## 2020-08-17 RX ADMIN — HEPARIN SODIUM 5000 UNIT(S): 5000 INJECTION INTRAVENOUS; SUBCUTANEOUS at 12:30

## 2020-08-17 RX ADMIN — BRIMONIDINE TARTRATE 1 DROP(S): 2 SOLUTION/ DROPS OPHTHALMIC at 05:21

## 2020-08-17 RX ADMIN — Medication 75 MICROGRAM(S): at 05:20

## 2020-08-17 RX ADMIN — Medication 1 APPLICATION(S): at 12:31

## 2020-08-17 RX ADMIN — AMLODIPINE BESYLATE 5 MILLIGRAM(S): 2.5 TABLET ORAL at 05:20

## 2020-08-17 RX ADMIN — Medication 1 APPLICATION(S): at 05:21

## 2020-08-17 RX ADMIN — INSULIN GLARGINE 10 UNIT(S): 100 INJECTION, SOLUTION SUBCUTANEOUS at 08:41

## 2020-08-17 RX ADMIN — HEPARIN SODIUM 5000 UNIT(S): 5000 INJECTION INTRAVENOUS; SUBCUTANEOUS at 22:40

## 2020-08-17 RX ADMIN — DORZOLAMIDE HYDROCHLORIDE TIMOLOL MALEATE 1 DROP(S): 20; 5 SOLUTION/ DROPS OPHTHALMIC at 17:40

## 2020-08-17 RX ADMIN — GABAPENTIN 300 MILLIGRAM(S): 400 CAPSULE ORAL at 22:39

## 2020-08-17 RX ADMIN — BRIMONIDINE TARTRATE 1 DROP(S): 2 SOLUTION/ DROPS OPHTHALMIC at 22:53

## 2020-08-17 RX ADMIN — DORZOLAMIDE HYDROCHLORIDE TIMOLOL MALEATE 1 DROP(S): 20; 5 SOLUTION/ DROPS OPHTHALMIC at 05:21

## 2020-08-17 RX ADMIN — Medication 3 MILLIGRAM(S): at 22:39

## 2020-08-17 RX ADMIN — Medication 1: at 08:40

## 2020-08-17 RX ADMIN — Medication 650 MILLIGRAM(S): at 12:30

## 2020-08-17 RX ADMIN — Medication 145 MILLIGRAM(S): at 12:31

## 2020-08-17 RX ADMIN — Medication 1 APPLICATION(S): at 23:43

## 2020-08-17 RX ADMIN — Medication 650 MILLIGRAM(S): at 05:20

## 2020-08-17 NOTE — DIETITIAN INITIAL EVALUATION ADULT. - DIET TYPE
Recommend soft + Consistent Carbohydrate with snack + [id number]-Qekohehmdd diet. Will continue to monitor and adjust as needed (will monitor phosphorus). Spoke to NP.

## 2020-08-17 NOTE — DIETITIAN INITIAL EVALUATION ADULT. - ENERGY NEEDS
Pertinent information as per chart: Pt 63 y/o F with PMH: insulin dependent DM, hypothyroidism, HLD, HTN, neuropathy, Crohn's disease on immunosuppressants, present to the ED with eye redness and pain, and rash that started about 5 days prior, found with Zoster ophthalmicus, facial shingles, and conjunctivitis, acute hypoxic respiratory failure-resolved, KAVIN- improving.

## 2020-08-17 NOTE — DIETITIAN INITIAL EVALUATION ADULT. - ADD RECOMMEND
1. Will continue to monitor PO intake, weight, labs, skin, GI status, diet. 2. Encourage PO intake and obtain food preferences (obtained at this time) - reviewed menu order procedures. 3. Provide education on weight loss, heart healthy, and DM nutrition therapy as needed/requested - pt/son made aware RD remains available. 1. Will continue to monitor PO intake, weight, labs, skin, GI status, diet. 2. Encourage PO intake and obtain food preferences (obtained at this time) - reviewed menu order procedures. 3. Provide education on weight loss, heart healthy, and DM nutrition therapy as feasible/requested - pt/son made aware RD remains available.

## 2020-08-17 NOTE — PROGRESS NOTE BEHAVIORAL HEALTH - NSBHCHARTREVIEWLAB_PSY_A_CORE FT
11.6   9.80  )-----------( 434      ( 17 Aug 2020 06:28 )             34.2     08-17    136  |  95<L>  |  61<H>  ----------------------------<  143<H>  3.7   |  24  |  5.71<H>    Ca    8.4      17 Aug 2020 06:28  Phos  7.0     08-15  Mg     1.7     08-15    TPro  6.7  /  Alb  3.0<L>  /  TBili  0.6  /  DBili  x   /  AST  45<H>  /  ALT  23  /  AlkPhos  75  08-15

## 2020-08-17 NOTE — PROGRESS NOTE ADULT - ASSESSMENT
_________________________________________________________________________________________  ========>>  M E D I C A L   A T T E N D I N G    F O L L O W  U P  N O T E  <<=========  -----------------------------------------------------------------------------------------------------    - Patient seen and examined by me earlier today.   - In summary,  COLBY SAAB is a 64y year old woman who originally presented with facial pain   - Patient today overall doing much better comfortable, eating better, drinking better, better mental status, good urine output       ==================>> REVIEW OF SYSTEM <<=================    GEN: no fever, no chills, pain improved overall , feels better, wants OOB to chair as well   RESP: no SOB, no cough, no sputum  CVS: no chest pain, no palpitations  GI: no abdominal pain, no nausea  : no issues with forbes   Neuro: no headache, no dizziness  Derm : no itching, no rash    ==================>> PHYSICAL EXAM <<=================    GEN: A&O X 3 , NAD , comfortable, MS has improved   HEENT: PERRL, MMM, hearing intact, injected conjunctiva,  facial zoster starting crusted over >> overall improved   Neck: supple , no JVD appreciated  CVS: S1S2 , regular , No M/R/G appreciated  PULM: CTA B/L,  no W/R/R appreciated  ABD.: soft. non tender, non distended,  bowel sounds present, obese   Extrem: intact pulses , no edema      + forbes with good urine output   PSYCH : normal mood,  not anxious      ==================>> MEDICATIONS <<====================    amLODIPine   Tablet 5 milliGRAM(s) Oral daily  ATENolol  Tablet 50 milliGRAM(s) Oral daily  atorvastatin 40 milliGRAM(s) Oral at bedtime  brimonidine 0.2% Ophthalmic Solution 1 Drop(s) Left EYE three times a day  dextrose 5%. 1000 milliLiter(s) IV Continuous <Continuous>  dextrose 50% Injectable 12.5 Gram(s) IV Push once  dextrose 50% Injectable 25 Gram(s) IV Push once  dextrose 50% Injectable 25 Gram(s) IV Push once  dorzolamide 2%/timolol 0.5% Ophthalmic Solution 1 Drop(s) Left EYE two times a day  erythromycin   Ointment 1 Application(s) Left EYE four times a day  fenofibrate Tablet 145 milliGRAM(s) Oral daily  furosemide   Injectable 20 milliGRAM(s) IV Push once  gabapentin 300 milliGRAM(s) Oral at bedtime  heparin   Injectable 5000 Unit(s) SubCutaneous every 8 hours  insulin glargine Injectable (LANTUS) 10 Unit(s) SubCutaneous at bedtime  insulin glargine Injectable (LANTUS) 10 Unit(s) SubCutaneous every morning  insulin lispro (HumaLOG) corrective regimen sliding scale   SubCutaneous three times a day before meals  levothyroxine 75 MICROGram(s) Oral daily  melatonin 3 milliGRAM(s) Oral at bedtime  mesalamine ER Capsule 500 milliGRAM(s) Oral two times a day  pantoprazole    Tablet 40 milliGRAM(s) Oral before breakfast  sodium bicarbonate 650 milliGRAM(s) Oral three times a day  sodium bicarbonate  Infusion 0.08 mEq/kG/Hr IV Continuous <Continuous>  valACYclovir 500 milliGRAM(s) Oral every 24 hours    MEDICATIONS  (PRN):  acetaminophen   Tablet .. 650 milliGRAM(s) Oral every 4 hours PRN Mild Pain (1 - 3)  dextrose 40% Gel 15 Gram(s) Oral once PRN Blood Glucose LESS THAN 70 milliGRAM(s)/deciliter  glucagon  Injectable 1 milliGRAM(s) IntraMuscular once PRN Glucose LESS THAN 70 milligrams/deciliter  haloperidol    Injectable 0.5 milliGRAM(s) IntraMuscular every 6 hours PRN agigtation    ___________  Active diet:  Diet, Regular:   Consistent Carbohydrate Evening Snack (CSTCHOSN)  Lacto-Ovo Veg (Accepts Milk Prod., Eggs)  ___________________    ==================>> VITAL SIGNS <<==================    Vital Signs Last 24 HrsT(C): 36.3 (08-17-20 @ 11:50)  T(F): 97.4 (08-17-20 @ 11:50), Max: 98.2 (08-16-20 @ 13:46)  HR: 77 (08-17-20 @ 11:50) (71 - 81)  BP: 121/78 (08-17-20 @ 11:50)  RR: 18 (08-17-20 @ 11:50) (18 - 24)  SpO2: 94% (08-17-20 @ 11:50) (93% - 98%)      POCT Blood Glucose.: 225 mg/dL (17 Aug 2020 12:08)  POCT Blood Glucose.: 174 mg/dL (17 Aug 2020 08:36)  POCT Blood Glucose.: 234 mg/dL (16 Aug 2020 21:30)  POCT Blood Glucose.: 204 mg/dL (16 Aug 2020 17:04)     ==================>> LAB AND IMAGING <<==================                        11.6   9.80  )-----------( 434      ( 17 Aug 2020 06:28 )             34.2        136  |  95<L>  |  61<H>  ----------------------------<  143<H>  3.7   |  24  |  5.71<H>    Ca    8.4      17 Aug 2020 06:28  Phos  7.0     08-15  Mg     1.7     08-15    TPro  6.7  /  Alb  3.0<L>  /  TBili  0.6  /  DBili  x   /  AST  45<H>  /  ALT  23  /  AlkPhos  75  08-15    WBC count:   9.80 <<== ,  12.36 <<== ,  13.67 <<== ,  11.88 <<==   Hemoglobin:   11.6 <<==,  10.8 <<==,  13.1 <<==,  11.8 <<==  platelets:  434 <==, 358 <==, 375 <==, 303 <==, 348 <==    Creatinine:  5.71  <<==, 6.80  <<==, 6.61  <<==, 7.16  <<==, 6.35  <<==, 5.24  <<==  Sodium:   136  <==, 133  <==, 130  <==, 130  <==, 127  <==, 126  <==, 129  <==    < from: US Kidney and Bladder (08.14.20 @ 13:16) >  IMPRESSION:  Normal renal ultrasound.  < end of copied text >    _______________________  C U L T U R E S :    Culture - Urine (collected 14 Aug 2020 09:50)  Source: .Urine Clean Catch (Midstream)  Final Report (15 Aug 2020 07:10):    No growth    ___________________________________________________________________________________  ===============>>  A S S E S S M E N T   A N D   P L A N <<===============  ------------------------------------------------------------------------------------------    · Assessment		  65 yo woman with a pmhx of Insulin dependent DM, Hypothyroidism, HLD, HTN, neuropathy, Crohn's disease on immunosuppressants present to the ED with eye redness and pain, and rash that started about 5 days prior       Problem/Plan - :  ·  Problem: Acute Hypoxic Respiratory Failure resolved   - Patient was placed on Bipap last night. Cxr showed pulmonary edema.   post diuretics and Bipap wit good results  now back on nasal canula  >. wean off to RA as able   monitor  clinically closely    Problem/Plan - :  ·  Problem: KAVIN improving   possibly due to Acyclovir and urinary retention   pt likely with baseline diabetic nephropathy  held Acyclovir  AVOID all NSAID use  held ACEI  Avoid nephrotoxic medications.   nephrology appreciated   sono as noted above / no hydro   monitor BMP  Forbes : monitor output   may need short term HD ?     Problem/Plan - 1:  ·  Problem: Zoster ophthalmicus, facial shingles, conjunctivitis..   overall improved  ophtho and ID following, appreciated  on valacyclovir now   ointment and drops as ordered / adjusted   supportive care  pain mgmt.     Problem/Plan - 2:  ·  Problem: Type 2 diabetes mellitus   lantus decreased due to hypoglycemia   RISS  monitor FS and adjust meds as needed for better glycemic control   A1C 7.5  Neurontin for diabetic neuropathy.     Problem/Plan - 3:  ·  Problem:  HTN / HLD   Continue Current medications and monitor.     Problem/Plan - 4:  Problem: Class 3 severe obesity due to excess calories with serious comorbidity and body mass index (BMI) of 40.0 to 44.9 in adult. Plan: nutrition consult  pt should get a sleep study as OP >> d/w pt     Problem/Plan - 5:  ·  Problem: h/o Crohn's disease   Balsalazide not formulary > changed to Mesalamine   monitor.     Problem/Plan - 6:  ·  Problem: Need for prophylactic measure.    heparin   Protonix.   OOB to chair, PT    --------------------------------------------  Case discussed with pt, RN  Education given on findings and plan of care  ___________________________  H. JOÃO Forman.  Pager: [phone]

## 2020-08-17 NOTE — DIETITIAN INITIAL EVALUATION ADULT. - PHYSICAL APPEARANCE
obese/No visual signs of muscle/fat loss noted/other (specify) Ht: 62 inches Wt: 206 pounds BMI: 37.6 kg/m2 IBW: 110 (+/-10%) 187.3 %IBW  Noted +1 left face edema as per flow sheets.   Skin: no noted pressure injuries as per documentation.

## 2020-08-17 NOTE — DIETITIAN INITIAL EVALUATION ADULT. - PERTINENT LABORATORY DATA
(08/12) HbA1c 7.5%, , HDL 38; Finger sticks: (08/17) 174 - 225 (08/16) 159 - 234; (08/17) BUN 61, Cr 5.71, ; (08/15) Phos 7.0

## 2020-08-17 NOTE — PROGRESS NOTE BEHAVIORAL HEALTH - NSBHCONSULTMEDS_PSY_A_CORE FT
cont haldol prn for agitation   avoid use of opoids, anticholinergics, benzos, hypnotics, or other meds which could cause confusion    Frequent reorientation    Attempt to keep patient up during the day with lights open, shades open

## 2020-08-17 NOTE — PROGRESS NOTE ADULT - SUBJECTIVE AND OBJECTIVE BOX
CC: f/u for V1 zoster    Patient reports: no headaches, no complaints    REVIEW OF SYSTEMS:  All other review of systems negative (Comprehensive ROS)    Antimicrobials Day #  :day 7/10  valACYclovir 500 milliGRAM(s) Oral every 24 hours    Other Medications Reviewed  MEDICATIONS  (STANDING):  amLODIPine   Tablet 5 milliGRAM(s) Oral daily  ATENolol  Tablet 50 milliGRAM(s) Oral daily  atorvastatin 40 milliGRAM(s) Oral at bedtime  brimonidine 0.2% Ophthalmic Solution 1 Drop(s) Left EYE three times a day  dextrose 5%. 1000 milliLiter(s) (50 mL/Hr) IV Continuous <Continuous>  dextrose 50% Injectable 12.5 Gram(s) IV Push once  dextrose 50% Injectable 25 Gram(s) IV Push once  dextrose 50% Injectable 25 Gram(s) IV Push once  dorzolamide 2%/timolol 0.5% Ophthalmic Solution 1 Drop(s) Left EYE two times a day  erythromycin   Ointment 1 Application(s) Left EYE four times a day  fenofibrate Tablet 145 milliGRAM(s) Oral daily  furosemide   Injectable 20 milliGRAM(s) IV Push once  gabapentin 300 milliGRAM(s) Oral at bedtime  heparin   Injectable 5000 Unit(s) SubCutaneous every 8 hours  insulin glargine Injectable (LANTUS) 10 Unit(s) SubCutaneous at bedtime  insulin glargine Injectable (LANTUS) 10 Unit(s) SubCutaneous every morning  insulin lispro (HumaLOG) corrective regimen sliding scale   SubCutaneous three times a day before meals  levothyroxine 75 MICROGram(s) Oral daily  melatonin 3 milliGRAM(s) Oral at bedtime  mesalamine ER Capsule 500 milliGRAM(s) Oral two times a day  pantoprazole    Tablet 40 milliGRAM(s) Oral before breakfast  sodium bicarbonate 650 milliGRAM(s) Oral three times a day  sodium bicarbonate  Infusion 0.08 mEq/kG/Hr (50 mL/Hr) IV Continuous <Continuous>  valACYclovir 500 milliGRAM(s) Oral every 24 hours    T(F): 97.4 (08-17-20 @ 11:50), Max: 98.2 (08-16-20 @ 23:56)  HR: 77 (08-17-20 @ 11:50)  BP: 121/78 (08-17-20 @ 11:50)  RR: 18 (08-17-20 @ 11:50)  SpO2: 94% (08-17-20 @ 11:50)  Wt(kg): --    PHYSICAL EXAM:  General: alert, no acute distress  Eyes:  anicteric, left eye is red.Dermatomal zoster lesions are custed  Oropharynx: no lesions or injection 	  Neck: supple, without adenopathy  Lungs: clear to auscultation  Heart: regular rate and rhythm; no murmur, rubs or gallops  Abdomen: soft, nondistended, nontender, without mass or organomegaly  Skin: no lesions  Extremities: no clubbing, cyanosis, or edema  Neurologic: alert, oriented, moves all extremities    LAB RESULTS:                        11.6   9.80  )-----------( 434      ( 17 Aug 2020 06:28 )             34.2     08-17    136  |  95<L>  |  61<H>  ----------------------------<  143<H>  3.7   |  24  |  5.71<H>    Ca    8.4      17 Aug 2020 06:28  Phos  7.0     08-15  Mg     1.7     08-15    TPro  6.7  /  Alb  3.0<L>  /  TBili  0.6  /  DBili  x   /  AST  45<H>  /  ALT  23  /  AlkPhos  75  08-15    LIVER FUNCTIONS - ( 15 Aug 2020 21:03 )  Alb: 3.0 g/dL / Pro: 6.7 g/dL / ALK PHOS: 75 U/L / ALT: 23 U/L / AST: 45 U/L / GGT: x             MICROBIOLOGY:  RECENT CULTURES:  08-14 @ 09:50 .Urine Clean Catch (Midstream)     No growth          RADIOLOGY REVIEWED:  < from: CT Chest No Cont (08.16.20 @ 09:11) >  IMPRESSION:  Findings suggestive of pulmonary edema.    Bilateral small pleural effusions.    < end of copied text >

## 2020-08-17 NOTE — PROGRESS NOTE BEHAVIORAL HEALTH - NSBHFUPINTERVALHXFT_PSY_A_CORE
Pt poor historian, slightly confused. Pt denies si/hi, psychosis, depression, inessa. pt currently calm. no prns given.

## 2020-08-17 NOTE — PROGRESS NOTE ADULT - ASSESSMENT
65 yo woman with a pmhx of Insulin dependent DM, Hypothyroidism, HLD, HTN, neuropathy, Crohn's disease on immunosuppressants admitted for zoster opthalmicus found to have KIERA    Kiera   scr 0.8 on 8/12  Scr continues to worsen today--   KIERA multifactorial -- sec to hyperglycmia, ACE, Ketorolac, Acyclovir and rentention   s/p bladder scan with 700cc urine --s/p forbes  continue to HOLD lisinopril (last dose on 8/13)  Optimize glucose control  AVOID all NSAID use (s/p ketorolac on 8/11)  Acyclovir on hold,- -last dose on 8/12  FENa >1 suggest ATN, Renal US is unremarkable  Urine has minimal protein, RBC + but has forbes  Monitor at present  Renal function improving today-- Pt non oliguric   Use bumex 2mg IV one dose and PRN in view of pulmonary edema in CT chest  No emergent indication for RRT at present  Consent obtained for RRT if needed  Monitor BMP   AVoid further nephrotoxics, NSAIDS RCA      Acidosis:  AG + Non-AG  Improving    HTN  BP stable  Hold lisinopril  Monitor BP  Increase amlodipine to 10mg If needed  Low salt diet       Hyponatremia  etiology? hyperglycemia contributing  Work up suggest polydipsia/liquid diet intake  Monitor at present

## 2020-08-17 NOTE — PROGRESS NOTE BEHAVIORAL HEALTH - NSBHCHARTREVIEWVS_PSY_A_CORE FT
Vital Signs Last 24 Hrs  T(C): 36.3 (17 Aug 2020 11:50), Max: 36.8 (16 Aug 2020 13:46)  T(F): 97.4 (17 Aug 2020 11:50), Max: 98.2 (16 Aug 2020 13:46)  HR: 77 (17 Aug 2020 11:50) (71 - 81)  BP: 121/78 (17 Aug 2020 11:50) (121/78 - 141/85)  BP(mean): --  RR: 18 (17 Aug 2020 11:50) (18 - 24)  SpO2: 94% (17 Aug 2020 11:50) (93% - 98%)

## 2020-08-17 NOTE — DIETITIAN INITIAL EVALUATION ADULT. - REASON INDICATOR FOR ASSESSMENT
Pt seen for length of stay initial assessment.   Information obtained from: medical record, pt, and pt's son at bedside.

## 2020-08-17 NOTE — DIETITIAN INITIAL EVALUATION ADULT. - OTHER INFO
Fall risk
Pt falling asleep during interview - son at bedside provided the majority of information. Son reports pt with good appetite and PO intake at home. Confirms pt with NKFA. Reports pt taking Vitamin D PTA; denies pt drinking any nutritional supplement. Reports pt following a Halzi-Mki-Ltqjwebzbh diet at home (consumes eggs and dairy; does not consume poultry, fish, or meat). Reports pt monitoring BG 2xday with ranges between 120 - 135 pounds and states pt taking Metformin, Novolog, and   Basaglar at home; HbA1c (08/12) 7.5% - indicates fair BG control.     Son reports pt continues with good appetite and PO intake. Noted mostly 100% PO intake as per flow sheets. Reports pt with mild difficulty chewing hard foods due to missing teeth - requests soft diet. Denies pt with difficulty swallowing. Denies pt with nausea, vomiting, diarrhea, or constipation; last BM 3 days ago (08/14) as per flow sheets.     Son denies pt with weight changes PTA, reports  pounds. Weight as per flow sheets (08/11) 206 pounds - will continue to monitor.     Reviewed menu order procedures and obtained food preferences. Pt/son deny having questions/concerns about diet and nutrition at this time; made aware RD remains available.

## 2020-08-17 NOTE — PROGRESS NOTE ADULT - ASSESSMENT
65 yo female with crohns ,IDDM,hypothyroidism, and HTN admitted with V1 zoster with keratitis  ACV induced KAVIN, starting to improve.  Zoster lesions are crusted  Continue valtrex, day 7/10  Hydration per renal and medicine  KAVIN from ACV should be reversable.

## 2020-08-17 NOTE — PROGRESS NOTE BEHAVIORAL HEALTH - NSBHCHARTREVIEWINVESTIGATE_PSY_A_CORE FT
Ventricular Rate 85 BPM    Atrial Rate 85 BPM    P-R Interval 158 ms    QRS Duration 80 ms    Q-T Interval 350 ms    QTC Calculation(Bezet) 416 ms    P Axis 23 degrees    R Axis 22 degrees    T Axis 37 degrees    Diagnosis Line NORMAL SINUS RHYTHM  LOW VOLTAGE QRS  CANNOT RULE OUT ANTERIOR INFARCT , AGE UNDETERMINED  ABNORMAL ECG  NO PREVIOUS ECGS AVAILABLE  Confirmed by CHRISTOPHER REYEZ MD ([id number]) on 8/16/2020 1:56:42 PM

## 2020-08-17 NOTE — PROGRESS NOTE ADULT - SUBJECTIVE AND OBJECTIVE BOX
[facility] [place] [facility]   MD MARIELA SLOAN MD RUORU WONG, PA    TEL:  OFFICE: [phone]  DR WINSLOW CELL: [phone]  JAYME BEAVERS CELL: [phone]  DR. THAPA CELL: [phone]  DR. JIMENEZ CELL: [phone]    FROM 5 PM - 7 AM PLEASE CALL ANSWERING SERVICE: [phone]    RENAL FOLLOW UP NOTE  --------------------------------------------------------------------------------  HPI:      Pt seen and examined at bedside.   Denies SOB, chest pain     PAST HISTORY  --------------------------------------------------------------------------------  No significant changes to PMH, PSH, FHx, SHx, unless otherwise noted    ALLERGIES & MEDICATIONS  --------------------------------------------------------------------------------  Allergies    No Known Allergies    Intolerances      Standing Inpatient Medications  amLODIPine   Tablet 5 milliGRAM(s) Oral daily  ATENolol  Tablet 50 milliGRAM(s) Oral daily  atorvastatin 40 milliGRAM(s) Oral at bedtime  brimonidine 0.2% Ophthalmic Solution 1 Drop(s) Left EYE three times a day  dextrose 5%. 1000 milliLiter(s) IV Continuous <Continuous>  dextrose 50% Injectable 12.5 Gram(s) IV Push once  dextrose 50% Injectable 25 Gram(s) IV Push once  dextrose 50% Injectable 25 Gram(s) IV Push once  dorzolamide 2%/timolol 0.5% Ophthalmic Solution 1 Drop(s) Left EYE two times a day  erythromycin   Ointment 1 Application(s) Left EYE four times a day  fenofibrate Tablet 145 milliGRAM(s) Oral daily  furosemide   Injectable 20 milliGRAM(s) IV Push once  gabapentin 300 milliGRAM(s) Oral at bedtime  heparin   Injectable 5000 Unit(s) SubCutaneous every 8 hours  insulin glargine Injectable (LANTUS) 10 Unit(s) SubCutaneous at bedtime  insulin glargine Injectable (LANTUS) 10 Unit(s) SubCutaneous every morning  insulin lispro (HumaLOG) corrective regimen sliding scale   SubCutaneous three times a day before meals  levothyroxine 75 MICROGram(s) Oral daily  melatonin 3 milliGRAM(s) Oral at bedtime  mesalamine ER Capsule 500 milliGRAM(s) Oral two times a day  pantoprazole    Tablet 40 milliGRAM(s) Oral before breakfast  sodium bicarbonate 650 milliGRAM(s) Oral three times a day  sodium bicarbonate  Infusion 0.08 mEq/kG/Hr IV Continuous <Continuous>  valACYclovir 500 milliGRAM(s) Oral every 24 hours    PRN Inpatient Medications  acetaminophen   Tablet .. 650 milliGRAM(s) Oral every 4 hours PRN  dextrose 40% Gel 15 Gram(s) Oral once PRN  glucagon  Injectable 1 milliGRAM(s) IntraMuscular once PRN  haloperidol    Injectable 0.5 milliGRAM(s) IntraMuscular every 6 hours PRN      REVIEW OF SYSTEMS  --------------------------------------------------------------------------------  General: no fever  CVS: no chest pain  RESP: no sob, no cough  ABD: no abdominal pain  MSK: no edema     VITALS/PHYSICAL EXAM  --------------------------------------------------------------------------------  T(C): 36.4 (08-17-20 @ 05:00), Max: 36.8 (08-16-20 @ 13:46)  HR: 80 (08-17-20 @ 06:28) (71 - 81)  BP: 126/78 (08-17-20 @ 05:00) (125/84 - 141/85)  RR: 18 (08-17-20 @ 05:32) (18 - 24)  SpO2: 95% (08-17-20 @ 06:28) (93% - 98%)  Wt(kg): --        08-16-20 @ 07:01  -  08-17-20 @ 07:00  --------------------------------------------------------  IN: 1200 mL / OUT: 3550 mL / NET: -2350 mL      Physical Exam:  	Gen: NAD  	HEENT: MMM  	Pulm: CTA B/L  	CV: S1S2  	Abd: Soft, +BS  	Ext: No LE edema B/L                      Neuro: Awake alert  	Skin: Warm and Dry   	Vascular access: no hd catheter          LEXUS forbes  LABS/STUDIES  --------------------------------------------------------------------------------              11.6   9.80  >-----------<  434      [08-17-20 @ 06:28]              34.2     136  |  95  |  61  ----------------------------<  143      [08-17-20 @ 06:28]  3.7   |  24  |  5.71        Ca     8.4     [08-17-20 @ 06:28]      Mg     1.7     [08-15-20 @ 21:03]      Phos  7.0     [08-15-20 @ 21:03]    TPro  6.7  /  Alb  3.0  /  TBili  0.6  /  DBili  x   /  AST  45  /  ALT  23  /  AlkPhos  75  [08-15-20 @ 21:03]          Creatinine Trend:  SCr 5.71 [08-17 @ 06:28]  SCr 6.80 [08-16 @ 06:48]  SCr 6.61 [08-15 @ 21:03]  SCr 7.16 [08-15 @ 07:32]  SCr 6.35 [08-14 @ 07:06]    Urinalysis - [08-14-20 @ 09:20]      Color Light Yellow / Appearance Clear / SG 1.006 / pH 6.5      Gluc Negative / Ketone Negative  / Bili Negative / Urobili <2 mg/dL       Blood Negative / Protein 30 mg/dL / Leuk Est Negative / Nitrite Negative      RBC 11 / WBC 1 / Hyaline 0 / Gran  / Sq Epi  / Non Sq Epi 1 / Bacteria Negative    Urine Creatinine 25      [08-15-20 @ 12:37]  Urine Sodium 79      [08-15-20 @ 12:37]  Urine Chloride <35      [08-14-20 @ 07:24]  Urine Osmolality 147      [08-14-20 @ 09:20]    TSH 1.29      [08-16-20 @ 09:36]  Lipid: chol 149, , HDL 38, LDL 77      [08-12-20 @ 12:15]    HCV 0.11, Nonreact      [08-13-20 @ 09:04]

## 2020-08-18 LAB
ANION GAP SERPL CALC-SCNC: 14 MMOL/L — SIGNIFICANT CHANGE UP (ref 5–17)
BUN SERPL-MCNC: 52 MG/DL — HIGH (ref 7–23)
CALCIUM SERPL-MCNC: 8.6 MG/DL — SIGNIFICANT CHANGE UP (ref 8.4–10.5)
CHLORIDE SERPL-SCNC: 98 MMOL/L — SIGNIFICANT CHANGE UP (ref 96–108)
CO2 SERPL-SCNC: 30 MMOL/L — SIGNIFICANT CHANGE UP (ref 22–31)
CREAT SERPL-MCNC: 4.27 MG/DL — HIGH (ref 0.5–1.3)
GLUCOSE BLDC GLUCOMTR-MCNC: 182 MG/DL — HIGH (ref 70–99)
GLUCOSE BLDC GLUCOMTR-MCNC: 189 MG/DL — HIGH (ref 70–99)
GLUCOSE BLDC GLUCOMTR-MCNC: 193 MG/DL — HIGH (ref 70–99)
GLUCOSE BLDC GLUCOMTR-MCNC: 218 MG/DL — HIGH (ref 70–99)
GLUCOSE SERPL-MCNC: 166 MG/DL — HIGH (ref 70–99)
HCT VFR BLD CALC: 35.5 % — SIGNIFICANT CHANGE UP (ref 34.5–45)
HGB BLD-MCNC: 11.6 G/DL — SIGNIFICANT CHANGE UP (ref 11.5–15.5)
MCHC RBC-ENTMCNC: 27 PG — SIGNIFICANT CHANGE UP (ref 27–34)
MCHC RBC-ENTMCNC: 32.7 GM/DL — SIGNIFICANT CHANGE UP (ref 32–36)
MCV RBC AUTO: 82.8 FL — SIGNIFICANT CHANGE UP (ref 80–100)
NRBC # BLD: 0 /100 WBCS — SIGNIFICANT CHANGE UP (ref 0–0)
PLATELET # BLD AUTO: 476 K/UL — HIGH (ref 150–400)
POTASSIUM SERPL-MCNC: 3.3 MMOL/L — LOW (ref 3.5–5.3)
POTASSIUM SERPL-SCNC: 3.3 MMOL/L — LOW (ref 3.5–5.3)
RBC # BLD: 4.29 M/UL — SIGNIFICANT CHANGE UP (ref 3.8–5.2)
RBC # FLD: 15.6 % — HIGH (ref 10.3–14.5)
SODIUM SERPL-SCNC: 142 MMOL/L — SIGNIFICANT CHANGE UP (ref 135–145)
WBC # BLD: 9.62 K/UL — SIGNIFICANT CHANGE UP (ref 3.8–10.5)
WBC # FLD AUTO: 9.62 K/UL — SIGNIFICANT CHANGE UP (ref 3.8–10.5)

## 2020-08-18 RX ORDER — POLYETHYLENE GLYCOL 3350 17 G/17G
17 POWDER, FOR SOLUTION ORAL DAILY
Refills: 0 | Status: DISCONTINUED | OUTPATIENT
Start: 2020-08-18 | End: 2020-08-21

## 2020-08-18 RX ORDER — POTASSIUM CHLORIDE 20 MEQ
40 PACKET (EA) ORAL ONCE
Refills: 0 | Status: COMPLETED | OUTPATIENT
Start: 2020-08-18 | End: 2020-08-18

## 2020-08-18 RX ORDER — SENNA PLUS 8.6 MG/1
2 TABLET ORAL AT BEDTIME
Refills: 0 | Status: DISCONTINUED | OUTPATIENT
Start: 2020-08-18 | End: 2020-08-21

## 2020-08-18 RX ADMIN — Medication 1 APPLICATION(S): at 23:56

## 2020-08-18 RX ADMIN — Medication 1: at 09:11

## 2020-08-18 RX ADMIN — Medication 40 MILLIEQUIVALENT(S): at 09:12

## 2020-08-18 RX ADMIN — VALACYCLOVIR 500 MILLIGRAM(S): 500 TABLET, FILM COATED ORAL at 23:56

## 2020-08-18 RX ADMIN — INSULIN GLARGINE 10 UNIT(S): 100 INJECTION, SOLUTION SUBCUTANEOUS at 21:36

## 2020-08-18 RX ADMIN — Medication 5 MILLIGRAM(S): at 14:37

## 2020-08-18 RX ADMIN — INSULIN GLARGINE 10 UNIT(S): 100 INJECTION, SOLUTION SUBCUTANEOUS at 09:12

## 2020-08-18 RX ADMIN — POLYETHYLENE GLYCOL 3350 17 GRAM(S): 17 POWDER, FOR SOLUTION ORAL at 11:50

## 2020-08-18 RX ADMIN — Medication 500 MILLIGRAM(S): at 06:10

## 2020-08-18 RX ADMIN — Medication 650 MILLIGRAM(S): at 06:10

## 2020-08-18 RX ADMIN — DORZOLAMIDE HYDROCHLORIDE TIMOLOL MALEATE 1 DROP(S): 20; 5 SOLUTION/ DROPS OPHTHALMIC at 06:08

## 2020-08-18 RX ADMIN — GABAPENTIN 300 MILLIGRAM(S): 400 CAPSULE ORAL at 21:36

## 2020-08-18 RX ADMIN — Medication 145 MILLIGRAM(S): at 11:49

## 2020-08-18 RX ADMIN — Medication 2: at 13:02

## 2020-08-18 RX ADMIN — BRIMONIDINE TARTRATE 1 DROP(S): 2 SOLUTION/ DROPS OPHTHALMIC at 21:45

## 2020-08-18 RX ADMIN — BRIMONIDINE TARTRATE 1 DROP(S): 2 SOLUTION/ DROPS OPHTHALMIC at 06:21

## 2020-08-18 RX ADMIN — BRIMONIDINE TARTRATE 1 DROP(S): 2 SOLUTION/ DROPS OPHTHALMIC at 14:06

## 2020-08-18 RX ADMIN — Medication 75 MICROGRAM(S): at 06:10

## 2020-08-18 RX ADMIN — HEPARIN SODIUM 5000 UNIT(S): 5000 INJECTION INTRAVENOUS; SUBCUTANEOUS at 14:18

## 2020-08-18 RX ADMIN — Medication 50 MEQ/KG/HR: at 11:50

## 2020-08-18 RX ADMIN — Medication 500 MILLIGRAM(S): at 17:24

## 2020-08-18 RX ADMIN — Medication 1 APPLICATION(S): at 17:29

## 2020-08-18 RX ADMIN — HEPARIN SODIUM 5000 UNIT(S): 5000 INJECTION INTRAVENOUS; SUBCUTANEOUS at 06:08

## 2020-08-18 RX ADMIN — ATORVASTATIN CALCIUM 40 MILLIGRAM(S): 80 TABLET, FILM COATED ORAL at 21:36

## 2020-08-18 RX ADMIN — Medication 3 MILLIGRAM(S): at 21:36

## 2020-08-18 RX ADMIN — Medication 1 APPLICATION(S): at 06:10

## 2020-08-18 RX ADMIN — Medication 1: at 17:25

## 2020-08-18 RX ADMIN — ATENOLOL 50 MILLIGRAM(S): 25 TABLET ORAL at 06:22

## 2020-08-18 RX ADMIN — AMLODIPINE BESYLATE 5 MILLIGRAM(S): 2.5 TABLET ORAL at 06:10

## 2020-08-18 RX ADMIN — Medication 20 MILLIGRAM(S): at 09:12

## 2020-08-18 RX ADMIN — DORZOLAMIDE HYDROCHLORIDE TIMOLOL MALEATE 1 DROP(S): 20; 5 SOLUTION/ DROPS OPHTHALMIC at 17:28

## 2020-08-18 RX ADMIN — Medication 1 APPLICATION(S): at 11:50

## 2020-08-18 RX ADMIN — PANTOPRAZOLE SODIUM 40 MILLIGRAM(S): 20 TABLET, DELAYED RELEASE ORAL at 06:08

## 2020-08-18 RX ADMIN — HEPARIN SODIUM 5000 UNIT(S): 5000 INJECTION INTRAVENOUS; SUBCUTANEOUS at 21:38

## 2020-08-18 RX ADMIN — SENNA PLUS 2 TABLET(S): 8.6 TABLET ORAL at 21:36

## 2020-08-18 NOTE — PROGRESS NOTE ADULT - ASSESSMENT
65 yo woman with a pmhx of Insulin dependent DM, Hypothyroidism, HLD, HTN, neuropathy, Crohn's disease on immunosuppressants admitted for zoster opthalmicus found to have KIERA    Kiera   scr 0.8 on 8/12  Scr improving  KIERA multifactorial -- sec to hyperglycmia, ACE, Ketorolac, Acyclovir and rentention   s/p bladder scan with 700cc urine --s/p forbes  continue to HOLD lisinopril (last dose on 8/13)  Optimize glucose control  AVOID all NSAID use (s/p ketorolac on 8/11)  Acyclovir on hold,- -last dose on 8/12  FENa >1 suggest ATN, Renal US is unremarkable  Urine has minimal protein, RBC + but has forbes  Monitor at present  Renal function improving today-- Pt non oliguric   Use bumex 2mg IV one dose and PRN in view of pulmonary edema in CT chest  No emergent indication for RRT at present  Consent obtained for RRT if needed  Monitor BMP   AVoid further nephrotoxics, NSAIDS RCA      Acidosis:  AG + Non-AG  Improved  D/C oral bicarb     HTN  BP stable  Hold lisinopril  Monitor BP  Increase amlodipine to 10mg If needed  Low salt diet       Hyponatremia  etiology? hyperglycemia contributing  Work up suggest polydipsia/liquid diet intake  Monitor at present

## 2020-08-18 NOTE — PHYSICAL THERAPY INITIAL EVALUATION ADULT - PERTINENT HX OF CURRENT PROBLEM, REHAB EVAL
pt is a 63 yo woman admitted to [facility] on 8/11/20 with a pmhx of Insulin dependent DM, Hypothyroidism, HLD, HTN, neuropathy, Crohn's disease on immunosuppressants present to the ED with eye redness and pain, and rash that started about 5 days prior. +zoster ophthalmicus, facial shingles, conjunctivitis.

## 2020-08-18 NOTE — PHYSICAL THERAPY INITIAL EVALUATION ADULT - PRECAUTIONS/LIMITATIONS, REHAB EVAL
Hospital course: per ID, V1 zoster with trabeculitis,better after acyclovir; 08/15/2020 Acute Hypoxic Respiratory Failure - Pt was placed on Bipap. Cxr shows pulmonary edema, 8/15 psych called 2/2 agigtation Haldol PRN- delirium 2* medical condition; +KAVIN possibly due to acyclovir and urinary retention; 8/17 post diuretics and Bipap with good results, on NC weaning to RA as able fall precautions/Hospital course: per ID, V1 zoster with trabeculitis,better after acyclovir; 08/15/2020 Acute Hypoxic Respiratory Failure - Pt was placed on Bipap. Cxr shows pulmonary edema, 8/15 psych called 2/2 agigtation Haldol PRN- delirium 2* medical condition; +KAVIN possibly due to acyclovir and urinary retention; 8/17 post diuretics and Bipap with good results, on NC weaning to RA as able

## 2020-08-18 NOTE — PHYSICAL THERAPY INITIAL EVALUATION ADULT - GENERAL OBSERVATIONS, REHAB EVAL
BS reviewed. Pt a/w herpes zoster L eye, hospital course c/b KAVIN and agitation/delirium. Pt received supine in bed, c/o pain at times, +IVL, +O2 via NC, A&Ox3, +requires incr time to process at times, moving all extremities, overall deconditioning.

## 2020-08-18 NOTE — PROGRESS NOTE ADULT - SUBJECTIVE AND OBJECTIVE BOX
CC: f/u for V1 zoster    Patient reports she feels very constipated today    REVIEW OF SYSTEMS:  All other review of systems negative (Comprehensive ROS)    Antimicrobials Day #  :1  valACYclovir 500 milliGRAM(s) Oral every 24 hours    Other Medications Reviewed    T(F): 97.9 (08-18-20 @ 05:10), Max: 98.2 (08-18-20 @ 02:07)  HR: 84 (08-18-20 @ 15:21)  BP: 134/83 (08-18-20 @ 12:21)  RR: 18 (08-18-20 @ 05:10)  SpO2: 96% (08-18-20 @ 15:21)  Wt(kg): --    PHYSICAL EXAM:  General: alert, upset but no acute distress  Eyes:  anicteric, no conjunctival injection, no discharge  Oropharynx: no lesions or injection 	  Neck: supple, without adenopathy  Lungs: clear to auscultation  Heart: regular rate and rhythm; no murmur, rubs or gallops  Abdomen: soft, nondistended, nontender, without mass or organomegaly  Skin: crusted face lesions  Extremities: no clubbing, cyanosis, or edema  Neurologic: alert, oriented, moves all extremities  back anus with solid stool extruding  LAB RESULTS:                        11.6   9.62  )-----------( 476      ( 18 Aug 2020 07:32 )             35.5     08-18    142  |  98  |  52<H>  ----------------------------<  166<H>  3.3<L>   |  30  |  4.27<H>    Ca    8.6      18 Aug 2020 07:32          MICROBIOLOGY:  RECENT CULTURES:  08-14 @ 09:50 .Urine Clean Catch (Midstream)     No growth          RADIOLOGY REVIEWED:  < from: CT Chest No Cont (08.16.20 @ 09:11) >    EXAM:  CT CHEST                            PROCEDURE DATE:  08/16/2020            INTERPRETATION:  CLINICAL INFORMATION: Hypoxia.    COMPARISON: Chest radiograph 8/15/2020.    PROCEDURE:  CT of the Chest was performed without intravenous contrast.  Sagittal and coronal reformats were performed.    FINDINGS:    LUNGS AND AIRWAYS: Patent central airways.  Bilateral patchy groundglass and dense opacities with interlobular septal thickening.  PLEURA: Bilateral small pleural effusions.  MEDIASTINUM AND KEN: No lymphadenopathy.  VESSELS: Within normal limits.  HEART: Heart size is enlarged. No pericardial effusion.  CHEST WALL AND LOWER NECK: Within normal limits.  VISUALIZED UPPER ABDOMEN: Within normal limits.  BONES: Degenerative changes.    IMPRESSION:  Findings suggestive of pulmonary edema.    Bilateral small pleural effusions.                < end of copied text >              Assessment:  V1 zoster with keratitis s/p acyclovir with kim now better. Pulmonary edema better. No c/o constipation.  Her face lesions are crusted so no need for isolation due to vzv any longer. SHe has crohns and now is in the process of a bm which hopefully will help her abdomen.  Plan:  2 more days of valtrex  bowel regimen  monitor abdomen exam

## 2020-08-18 NOTE — PROGRESS NOTE ADULT - ASSESSMENT
_________________________________________________________________________________________  ========>>  M E D I C A L   A T T E N D I N G    F O L L O W  U P  N O T E  <<=========  -----------------------------------------------------------------------------------------------------    - Patient seen and examined by me earlier today.   - In summary,  COLBY SAAB is a 64y year old woman who originally presented with facial pain   - Patient today overall doing much better comfortable, eating better, drinking better, better mental status, good urine output , getting out of bed       ==================>> REVIEW OF SYSTEM <<=================    GEN: no fever, no chills, pain improved overall , feels better  RESP: no SOB, no cough, no sputum  CVS: no chest pain, no palpitations  GI: no abdominal pain, no nausea  : no issues with forbes   Neuro: no headache, no dizziness  Derm : no itching, no rash    ==================>> PHYSICAL EXAM <<=================    GEN: A&O X 3 , NAD , comfortable, MS has improved   HEENT: PERRL, MMM, hearing intact, injected conjunctiva,  facial zoster starting crusted over >> overall improved   Neck: supple , no JVD appreciated  CVS: S1S2 , regular , No M/R/G appreciated  PULM: CTA B/L,  no W/R/R appreciated  ABD.: soft. non tender, non distended,  bowel sounds present, obese   Extrem: intact pulses , no edema      + forbes with good urine output   PSYCH : normal mood,  not anxious      ==================>> MEDICATIONS <<====================    amLODIPine   Tablet 5 milliGRAM(s) Oral daily  ATENolol  Tablet 50 milliGRAM(s) Oral daily  atorvastatin 40 milliGRAM(s) Oral at bedtime  brimonidine 0.2% Ophthalmic Solution 1 Drop(s) Left EYE three times a day  dextrose 5%. 1000 milliLiter(s) IV Continuous <Continuous>  dextrose 50% Injectable 12.5 Gram(s) IV Push once  dextrose 50% Injectable 25 Gram(s) IV Push once  dextrose 50% Injectable 25 Gram(s) IV Push once  dorzolamide 2%/timolol 0.5% Ophthalmic Solution 1 Drop(s) Left EYE two times a day  erythromycin   Ointment 1 Application(s) Left EYE four times a day  fenofibrate Tablet 145 milliGRAM(s) Oral daily  gabapentin 300 milliGRAM(s) Oral at bedtime  heparin   Injectable 5000 Unit(s) SubCutaneous every 8 hours  insulin glargine Injectable (LANTUS) 10 Unit(s) SubCutaneous at bedtime  insulin glargine Injectable (LANTUS) 10 Unit(s) SubCutaneous every morning  insulin lispro (HumaLOG) corrective regimen sliding scale   SubCutaneous three times a day before meals  levothyroxine 75 MICROGram(s) Oral daily  melatonin 3 milliGRAM(s) Oral at bedtime  mesalamine ER Capsule 500 milliGRAM(s) Oral two times a day  pantoprazole    Tablet 40 milliGRAM(s) Oral before breakfast  polyethylene glycol 3350 17 Gram(s) Oral daily  senna 2 Tablet(s) Oral at bedtime  sodium bicarbonate  Infusion 0.08 mEq/kG/Hr IV Continuous <Continuous>  valACYclovir 500 milliGRAM(s) Oral every 24 hours    MEDICATIONS  (PRN):  acetaminophen   Tablet .. 650 milliGRAM(s) Oral every 4 hours PRN Mild Pain (1 - 3)  bisacodyl 5 milliGRAM(s) Oral every 12 hours PRN Constipation  dextrose 40% Gel 15 Gram(s) Oral once PRN Blood Glucose LESS THAN 70 milliGRAM(s)/deciliter  glucagon  Injectable 1 milliGRAM(s) IntraMuscular once PRN Glucose LESS THAN 70 milligrams/deciliter  haloperidol    Injectable 0.5 milliGRAM(s) IntraMuscular every 6 hours PRN agigtation    ___________  Active diet:  Diet, Soft:   Consistent Carbohydrate No Snacks (CSTCHO)  Lacto-Ovo Veg (Accepts Milk Prod., Eggs)  Supplement Feeding Modality:  Oral  Probiotic Yogurt/Smoothie Cans or Servings Per Day:  1       Frequency:  Two Times a day  ___________________    ==================>> VITAL SIGNS <<==================    Vital Signs Last 24 HrsT(C): 36.6 (08-18-20 @ 05:10)  T(F): 97.9 (08-18-20 @ 05:10), Max: 98.2 (08-18-20 @ 02:07)  HR: 82 (08-18-20 @ 12:21) (77 - 83)  BP: 134/83 (08-18-20 @ 12:21)  RR: 18 (08-18-20 @ 05:10) (17 - 18)  SpO2: 94% (08-18-20 @ 12:21) (94% - 96%)      POCT Blood Glucose.: 218 mg/dL (18 Aug 2020 12:17)  POCT Blood Glucose.: 182 mg/dL (18 Aug 2020 08:28)  POCT Blood Glucose.: 222 mg/dL (17 Aug 2020 21:47)  POCT Blood Glucose.: 150 mg/dL (17 Aug 2020 17:35)     ==================>> LAB AND IMAGING <<==================                        11.6   9.62  )-----------( 476      ( 18 Aug 2020 07:32 )             35.5        08-18    142  |  98  |  52<H>  ----------------------------<  166<H>  3.3<L>   |  30  |  4.27<H>    Ca    8.6      18 Aug 2020 07:32    WBC count:   9.62 <<== ,  9.80 <<== ,  12.36 <<== ,  13.67 <<== ,  11.88 <<==   Hemoglobin:   11.6 <<==,  11.6 <<==,  10.8 <<==,  13.1 <<==,  11.8 <<==  platelets:  476 <==, 434 <==, 358 <==, 375 <==, 303 <==    Creatinine:  4.27  <<==, 5.71  <<==, 6.80  <<==, 6.61  <<==, 7.16  <<==, 6.35  <<==  Sodium:   142  <==, 136  <==, 133  <==, 130  <==, 130  <==, 127  <==, 126  <==      ___________________________________________________________________________________  ===============>>  A S S E S S M E N T   A N D   P L A N <<===============  ------------------------------------------------------------------------------------------    · Assessment		  63 yo woman with a pmhx of Insulin dependent DM, Hypothyroidism, HLD, HTN, neuropathy, Crohn's disease on immunosuppressants present to the ED with eye redness and pain, and rash that started about 5 days prior       Problem/Plan - :  ·  Problem: Acute Hypoxic Respiratory Failure resolved   - Patient was placed on Bipap last night. Cxr showed pulmonary edema.   post diuretics and Bipap wit good results  now back on nasal canula  > wean off to and monitor on RA   monitor  clinically closely  OOB to chair     Problem/Plan - :  ·  Problem: KAVIN improving   possibly due to Acyclovir and urinary retention   pt likely with baseline diabetic nephropathy  held Acyclovir  AVOID all NSAID use  held ACEI  Avoid nephrotoxic medications.   nephrology appreciated   sono as noted above / no hydro   monitor BMP  Forbes : monitor output     Problem/Plan - 1:  ·  Problem: Zoster ophthalmicus, facial shingles, conjunctivitis..   overall improved  ophtho and ID following, appreciated  on valacyclovir now   ointment and drops as ordered / adjusted   supportive care  pain mgmt.     Problem/Plan - 2:  ·  Problem: Type 2 diabetes mellitus   lantus decreased due to hypoglycemia   RISS  monitor FS and adjust meds as needed for better glycemic control   A1C 7.5  Neurontin for diabetic neuropathy.     Problem/Plan - 3:  ·  Problem:  HTN / HLD   Continue Current medications and monitor.     Problem/Plan - 4:  Problem: Class 3 severe obesity due to excess calories with serious comorbidity and body mass index (BMI) of 40.0 to 44.9 in adult. Plan: nutrition consult  pt should get a sleep study as OP >> d/w pt     Problem/Plan - 5:  ·  Problem: h/o Crohn's disease   Balsalazide not formulary > changed to Mesalamine   monitor.     Problem/Plan - 6:  ·  Problem: Need for prophylactic measure.    heparin   Protonix.   OOB to chair, PT    --------------------------------------------  Case discussed with pt, RN, PT, NP  Education given on findings and plan of care  ___________________________  HAngela Forman D.O.  Pager: [phone]

## 2020-08-18 NOTE — PHYSICAL THERAPY INITIAL EVALUATION ADULT - DISCHARGE DISPOSITION, PT EVAL
DC plan subacute rehab for strengthening, balance training, endurance training, son in agreement, CM Soila aware via note.

## 2020-08-18 NOTE — PHYSICAL THERAPY INITIAL EVALUATION ADULT - ADDITIONAL COMMENTS
pt lives at home with spouse and son, +steps to enter home, PTA ind with cane, ind ADLs, ; helping spouse at times

## 2020-08-19 LAB
ANION GAP SERPL CALC-SCNC: 14 MMOL/L — SIGNIFICANT CHANGE UP (ref 5–17)
BUN SERPL-MCNC: 39 MG/DL — HIGH (ref 7–23)
CALCIUM SERPL-MCNC: 9.2 MG/DL — SIGNIFICANT CHANGE UP (ref 8.4–10.5)
CHLORIDE SERPL-SCNC: 96 MMOL/L — SIGNIFICANT CHANGE UP (ref 96–108)
CO2 SERPL-SCNC: 32 MMOL/L — HIGH (ref 22–31)
CREAT SERPL-MCNC: 2.96 MG/DL — HIGH (ref 0.5–1.3)
GLUCOSE BLDC GLUCOMTR-MCNC: 180 MG/DL — HIGH (ref 70–99)
GLUCOSE BLDC GLUCOMTR-MCNC: 202 MG/DL — HIGH (ref 70–99)
GLUCOSE BLDC GLUCOMTR-MCNC: 227 MG/DL — HIGH (ref 70–99)
GLUCOSE BLDC GLUCOMTR-MCNC: 262 MG/DL — HIGH (ref 70–99)
GLUCOSE SERPL-MCNC: 200 MG/DL — HIGH (ref 70–99)
HCT VFR BLD CALC: 35.6 % — SIGNIFICANT CHANGE UP (ref 34.5–45)
HGB BLD-MCNC: 11.5 G/DL — SIGNIFICANT CHANGE UP (ref 11.5–15.5)
MCHC RBC-ENTMCNC: 26.9 PG — LOW (ref 27–34)
MCHC RBC-ENTMCNC: 32.3 GM/DL — SIGNIFICANT CHANGE UP (ref 32–36)
MCV RBC AUTO: 83.2 FL — SIGNIFICANT CHANGE UP (ref 80–100)
NRBC # BLD: 0 /100 WBCS — SIGNIFICANT CHANGE UP (ref 0–0)
PLATELET # BLD AUTO: 530 K/UL — HIGH (ref 150–400)
POTASSIUM SERPL-MCNC: 3 MMOL/L — LOW (ref 3.5–5.3)
POTASSIUM SERPL-SCNC: 3 MMOL/L — LOW (ref 3.5–5.3)
RBC # BLD: 4.28 M/UL — SIGNIFICANT CHANGE UP (ref 3.8–5.2)
RBC # FLD: 15.8 % — HIGH (ref 10.3–14.5)
SODIUM SERPL-SCNC: 142 MMOL/L — SIGNIFICANT CHANGE UP (ref 135–145)
WBC # BLD: 11.22 K/UL — HIGH (ref 3.8–10.5)
WBC # FLD AUTO: 11.22 K/UL — HIGH (ref 3.8–10.5)

## 2020-08-19 RX ORDER — POTASSIUM CHLORIDE 20 MEQ
20 PACKET (EA) ORAL ONCE
Refills: 0 | Status: COMPLETED | OUTPATIENT
Start: 2020-08-19 | End: 2020-08-19

## 2020-08-19 RX ADMIN — Medication 650 MILLIGRAM(S): at 15:54

## 2020-08-19 RX ADMIN — BRIMONIDINE TARTRATE 1 DROP(S): 2 SOLUTION/ DROPS OPHTHALMIC at 05:19

## 2020-08-19 RX ADMIN — Medication 3: at 12:48

## 2020-08-19 RX ADMIN — HEPARIN SODIUM 5000 UNIT(S): 5000 INJECTION INTRAVENOUS; SUBCUTANEOUS at 05:13

## 2020-08-19 RX ADMIN — SENNA PLUS 2 TABLET(S): 8.6 TABLET ORAL at 22:42

## 2020-08-19 RX ADMIN — Medication 1: at 17:33

## 2020-08-19 RX ADMIN — Medication 145 MILLIGRAM(S): at 12:48

## 2020-08-19 RX ADMIN — Medication 1: at 08:55

## 2020-08-19 RX ADMIN — PANTOPRAZOLE SODIUM 40 MILLIGRAM(S): 20 TABLET, DELAYED RELEASE ORAL at 08:54

## 2020-08-19 RX ADMIN — Medication 650 MILLIGRAM(S): at 22:34

## 2020-08-19 RX ADMIN — ATORVASTATIN CALCIUM 40 MILLIGRAM(S): 80 TABLET, FILM COATED ORAL at 22:42

## 2020-08-19 RX ADMIN — Medication 20 MILLIEQUIVALENT(S): at 12:40

## 2020-08-19 RX ADMIN — Medication 3 MILLIGRAM(S): at 22:42

## 2020-08-19 RX ADMIN — Medication 50 MEQ/KG/HR: at 08:55

## 2020-08-19 RX ADMIN — Medication 650 MILLIGRAM(S): at 03:09

## 2020-08-19 RX ADMIN — Medication 500 MILLIGRAM(S): at 17:35

## 2020-08-19 RX ADMIN — Medication 1 APPLICATION(S): at 12:48

## 2020-08-19 RX ADMIN — Medication 500 MILLIGRAM(S): at 05:14

## 2020-08-19 RX ADMIN — HEPARIN SODIUM 5000 UNIT(S): 5000 INJECTION INTRAVENOUS; SUBCUTANEOUS at 22:42

## 2020-08-19 RX ADMIN — INSULIN GLARGINE 10 UNIT(S): 100 INJECTION, SOLUTION SUBCUTANEOUS at 08:54

## 2020-08-19 RX ADMIN — DORZOLAMIDE HYDROCHLORIDE TIMOLOL MALEATE 1 DROP(S): 20; 5 SOLUTION/ DROPS OPHTHALMIC at 17:35

## 2020-08-19 RX ADMIN — Medication 1 APPLICATION(S): at 05:13

## 2020-08-19 RX ADMIN — DORZOLAMIDE HYDROCHLORIDE TIMOLOL MALEATE 1 DROP(S): 20; 5 SOLUTION/ DROPS OPHTHALMIC at 05:13

## 2020-08-19 RX ADMIN — GABAPENTIN 300 MILLIGRAM(S): 400 CAPSULE ORAL at 22:42

## 2020-08-19 RX ADMIN — HEPARIN SODIUM 5000 UNIT(S): 5000 INJECTION INTRAVENOUS; SUBCUTANEOUS at 14:54

## 2020-08-19 RX ADMIN — BRIMONIDINE TARTRATE 1 DROP(S): 2 SOLUTION/ DROPS OPHTHALMIC at 15:10

## 2020-08-19 RX ADMIN — Medication 1 APPLICATION(S): at 17:34

## 2020-08-19 RX ADMIN — AMLODIPINE BESYLATE 5 MILLIGRAM(S): 2.5 TABLET ORAL at 05:14

## 2020-08-19 RX ADMIN — VALACYCLOVIR 500 MILLIGRAM(S): 500 TABLET, FILM COATED ORAL at 22:42

## 2020-08-19 RX ADMIN — Medication 650 MILLIGRAM(S): at 14:54

## 2020-08-19 RX ADMIN — INSULIN GLARGINE 10 UNIT(S): 100 INJECTION, SOLUTION SUBCUTANEOUS at 22:41

## 2020-08-19 RX ADMIN — Medication 650 MILLIGRAM(S): at 05:20

## 2020-08-19 RX ADMIN — Medication 75 MICROGRAM(S): at 05:14

## 2020-08-19 RX ADMIN — ATENOLOL 50 MILLIGRAM(S): 25 TABLET ORAL at 05:14

## 2020-08-19 NOTE — PROGRESS NOTE ADULT - SUBJECTIVE AND OBJECTIVE BOX
CC: f/u for H zoster opthalmicus    Patient reports: poor appetite and overall weakness    REVIEW OF SYSTEMS:  All other review of systems negative (Comprehensive ROS)    Antimicrobials Day #  :day 9/10  valACYclovir 500 milliGRAM(s) Oral every 24 hours    Other Medications Reviewed    T(F): 98 (08-19-20 @ 12:07), Max: 98.7 (08-18-20 @ 21:53)  HR: 74 (08-19-20 @ 12:07)  BP: 165/80 (08-19-20 @ 12:07)  RR: 17 (08-19-20 @ 12:07)  SpO2: 96% (08-19-20 @ 12:07)  Wt(kg): --    PHYSICAL EXAM:  General: alert, no acute distress  Eyes:  anicteric, left eye injected, skin lesions are crusted  Oropharynx: no lesions or injection 	  Neck: supple, without adenopathy  Lungs: clear to auscultation  Heart: regular rate and rhythm; no murmur, rubs or gallops  Abdomen: soft, nondistended, nontender, without mass or organomegaly  Skin: no lesions  Extremities: no clubbing, cyanosis, or edema  Neurologic: alert, oriented, moves all extremities    LAB RESULTS:                        11.5   11.22 )-----------( 530      ( 19 Aug 2020 06:39 )             35.6     08-19    142  |  96  |  39<H>  ----------------------------<  200<H>  3.0<L>   |  32<H>  |  2.96<H>    Ca    9.2      19 Aug 2020 06:38          MICROBIOLOGY:  RECENT CULTURES:      RADIOLOGY REVIEWED:    < from: CT Chest No Cont (08.16.20 @ 09:11) >  IMPRESSION:  Findings suggestive of pulmonary edema.    Bilateral small pleural effusions.    < end of copied text >

## 2020-08-19 NOTE — PROGRESS NOTE ADULT - ASSESSMENT
65 yo woman with a pmhx of Insulin dependent DM, Hypothyroidism, HLD, HTN, neuropathy, Crohn's disease on immunosuppressants admitted for zoster opthalmicus found to have KIERA    Kiera   scr 0.8 on 8/12  Scr improving daily  KIERA multifactorial -- sec to hyperglycmia, ACE, Ketorolac, Acyclovir and rentention   s/p bladder scan with 700cc urine --s/p forbes. Pt remains non-oliguric   continue to HOLD lisinopril (last dose on 8/13)  Optimize glucose control  AVOID all NSAID use (s/p ketorolac on 8/11)  Acyclovir on hold,- -last dose on 8/12  FENa >1 suggest ATN, Renal US is unremarkable  Urine has minimal protein, RBC + but has forbes  Monitor at present  Use bumex 2mg IV one dose and PRN in view of pulmonary edema in CT chest. S/p Bumex IV as needed   No emergent indication for RRT at present  Consent obtained for RRT if needed  Monitor BMP   AVoid further nephrotoxics, NSAIDS RCA      Acidosis:  AG + Non-AG  Will HOLD sodium bicarb drip today.     HTN  BP stable  Hold lisinopril  Monitor BP  Low salt diet     Hyponatremia  etiology? hyperglycemia contributing  Work up suggest polydipsia/liquid diet intake  Na improving today   Monitor at present

## 2020-08-19 NOTE — PROGRESS NOTE ADULT - ASSESSMENT
_________________________________________________________________________________________  ========>>  M E D I C A L   A T T E N D I N G    F O L L O W  U P  N O T E  <<=========  -----------------------------------------------------------------------------------------------------    - Patient seen and examined by me earlier today.   - In summary,  COLBY SAAB is a 64y year old woman who originally presented with facial pain   - Patient today overall doing much better comfortable, eating better, drinking better, better mental status, good urine output , getting out of bed , had several BMs       ==================>> REVIEW OF SYSTEM <<=================    GEN: no fever, no chills, pain improved overall , feels better  RESP: no SOB, no cough, no sputum  CVS: no chest pain, no palpitations  GI: no abdominal pain, no nausea  : no issues with forbes   Neuro: no headache, no dizziness  Derm : no itching, no rash    ==================>> PHYSICAL EXAM <<=================    GEN: A&O X 3 , NAD , comfortable, MS has improved   HEENT: PERRL, MMM, hearing intact, injected conjunctiva,  facial zoster starting crusted over >> overall improved   Neck: supple , no JVD appreciated  CVS: S1S2 , regular , No M/R/G appreciated  PULM: CTA B/L,  no W/R/R appreciated  ABD.: soft. non tender, non distended,  bowel sounds present, obese   Extrem: intact pulses , no edema      + forbes with good urine output   PSYCH : normal mood,  not anxious      ==================>> MEDICATIONS <<====================    amLODIPine   Tablet 5 milliGRAM(s) Oral daily  ATENolol  Tablet 50 milliGRAM(s) Oral daily  atorvastatin 40 milliGRAM(s) Oral at bedtime  brimonidine 0.2% Ophthalmic Solution 1 Drop(s) Left EYE three times a day  dextrose 5%. 1000 milliLiter(s) IV Continuous <Continuous>  dextrose 50% Injectable 12.5 Gram(s) IV Push once  dextrose 50% Injectable 25 Gram(s) IV Push once  dextrose 50% Injectable 25 Gram(s) IV Push once  dorzolamide 2%/timolol 0.5% Ophthalmic Solution 1 Drop(s) Left EYE two times a day  erythromycin   Ointment 1 Application(s) Left EYE four times a day  fenofibrate Tablet 145 milliGRAM(s) Oral daily  gabapentin 300 milliGRAM(s) Oral at bedtime  heparin   Injectable 5000 Unit(s) SubCutaneous every 8 hours  insulin glargine Injectable (LANTUS) 10 Unit(s) SubCutaneous at bedtime  insulin glargine Injectable (LANTUS) 10 Unit(s) SubCutaneous every morning  insulin lispro (HumaLOG) corrective regimen sliding scale   SubCutaneous three times a day before meals  levothyroxine 75 MICROGram(s) Oral daily  melatonin 3 milliGRAM(s) Oral at bedtime  mesalamine ER Capsule 500 milliGRAM(s) Oral two times a day  pantoprazole    Tablet 40 milliGRAM(s) Oral before breakfast  polyethylene glycol 3350 17 Gram(s) Oral daily  senna 2 Tablet(s) Oral at bedtime  valACYclovir 500 milliGRAM(s) Oral every 24 hours    MEDICATIONS  (PRN):  acetaminophen   Tablet .. 650 milliGRAM(s) Oral every 4 hours PRN Mild Pain (1 - 3)  bisacodyl 5 milliGRAM(s) Oral every 12 hours PRN Constipation  dextrose 40% Gel 15 Gram(s) Oral once PRN Blood Glucose LESS THAN 70 milliGRAM(s)/deciliter  glucagon  Injectable 1 milliGRAM(s) IntraMuscular once PRN Glucose LESS THAN 70 milligrams/deciliter    ___________  Active diet:  Diet, Soft:   Consistent Carbohydrate No Snacks (CSTCHO)  Lacto-Ovo Veg (Accepts Milk Prod., Eggs)  Supplement Feeding Modality:  Oral  Probiotic Yogurt/Smoothie Cans or Servings Per Day:  1       Frequency:  Two Times a day  ___________________    ==================>> VITAL SIGNS <<==================    Vital Signs Last 24 HrsT(C): 36.7 (08-19-20 @ 12:07)  T(F): 98 (08-19-20 @ 12:07), Max: 98.7 (08-18-20 @ 21:53)  HR: 74 (08-19-20 @ 12:07) (73 - 86)  BP: 165/80 (08-19-20 @ 12:07)  RR: 17 (08-19-20 @ 12:07) (17 - 18)  SpO2: 96% (08-19-20 @ 12:07) (96% - 99%)      POCT Blood Glucose.: 262 mg/dL (19 Aug 2020 12:13)  POCT Blood Glucose.: 180 mg/dL (19 Aug 2020 08:34)  POCT Blood Glucose.: 193 mg/dL (18 Aug 2020 21:24)  POCT Blood Glucose.: 189 mg/dL (18 Aug 2020 16:55)     ==================>> LAB AND IMAGING <<==================                        11.5   11.22 )-----------( 530      ( 19 Aug 2020 06:39 )             35.6        08-19    142  |  96  |  39<H>  ----------------------------<  200<H>  3.0<L>   |  32<H>  |  2.96<H>    Ca    9.2      19 Aug 2020 06:38      WBC count:   11.22 <<== ,  9.62 <<== ,  9.80 <<== ,  12.36 <<== ,  13.67 <<== ,  11.88 <<==   Hemoglobin:   11.5 <<==,  11.6 <<==,  11.6 <<==,  10.8 <<==,  13.1 <<==,  11.8 <<==  platelets:  530 <==, 476 <==, 434 <==, 358 <==, 375 <==, 303 <==    Creatinine:  2.96  <<==, 4.27  <<==, 5.71  <<==, 6.80  <<==, 6.61  <<==, 7.16  <<==  Sodium:   142  <==, 142  <==, 136  <==, 133  <==, 130  <==, 130  <==, 127  <==    ___________________________________________________________________________________  ===============>>  A S S E S S M E N T   A N D   P L A N <<===============  ------------------------------------------------------------------------------------------    · Assessment		  63 yo woman with a pmhx of Insulin dependent DM, Hypothyroidism, HLD, HTN, neuropathy, Crohn's disease on immunosuppressants present to the ED with eye redness and pain, and rash that started about 5 days prior       Problem/Plan - :  ·  Problem: Acute Hypoxic Respiratory Failure resolved   - Patient doing well, on low rate O2 via NC > wean off to and monitor on RA as able   post diuretics and Bipap wit good results  monitor  clinically closely  OOB to chair encouraged     Problem/Plan - :  ·  Problem: KAVIN improving   possibly due to Acyclovir and urinary retention   pt likely with baseline diabetic nephropathy  held Acyclovir  AVOID all NSAID use  held ACEI  Avoid nephrotoxic medications.   nephrology appreciated   sono as noted above / no hydro   monitor BMP  Forbes : monitor output  >>? trial of void ? if ok with renal     Problem/Plan - 1:  ·  Problem: Zoster ophthalmicus, facial shingles, conjunctivitis..   overall improved  ophtho and ID following, appreciated  on valacyclovir now >> 9/10  ointment and drops as ordered / adjusted   supportive care  pain mgmt.     Problem/Plan - 2:  ·  Problem: Type 2 diabetes mellitus   lantus decreased due to hypoglycemia   RISS  monitor FS and adjust meds as needed for better glycemic control   A1C 7.5  Neurontin for diabetic neuropathy.     Problem/Plan - 3:  ·  Problem:  HTN / HLD   Continue Current medications and monitor.     Problem/Plan - 4:  Problem: Class 3 severe obesity due to excess calories with serious comorbidity and body mass index (BMI) of 40.0 to 44.9 in adult. Plan: nutrition consult  pt should get a sleep study as OP >> d/w pt     Problem/Plan - 5:  ·  Problem: h/o Crohn's disease   Balsalazide not formulary > changed to Mesalamine   monitor.     Problem/Plan - 6:  ·  Problem: Need for prophylactic measure.    heparin   Protonix.   OOB to chair, PT    possibly DC plan to rehab by Friday     --------------------------------------------  Case discussed with pt  Education given on findings and plan of care  ___________________________  HAngela Forman D.O.  Pager: [phone]

## 2020-08-19 NOTE — PHARMACOTHERAPY INTERVENTION NOTE - COMMENTS
Recommended increasing Valtrex dose to 1g q24h based on improved renal function.    Barbara Olivier, PharmD  PGY-1 Pharmacy Resident  [phone]

## 2020-08-19 NOTE — PROGRESS NOTE ADULT - SUBJECTIVE AND OBJECTIVE BOX
[facility]   MD Yobany Dean MD, D.O. Ruoru Wong, PA    From 7 AM - 5 PM:  OFFICE: [phone]  Dr. Ware cell: [phone]  Dr. Reyes cell: [phone]  Dr. Lincoln cell: [phone]  PAOLA Tejada cell: [phone]    From 5 PM - 7 AM: Answering Service: [phone]      RENAL FOLLOW UP NOTE  --------------------------------------------------------------------------------  HPI:  Pt seen and examined at bedside. Tolerating oral intake. Non oliguric at bedside   Denies SOB, chest pain, nausea, vomiting     PAST HISTORY  --------------------------------------------------------------------------------  No significant changes to PMH, PSH, FHx, SHx, unless otherwise noted    ALLERGIES & MEDICATIONS  --------------------------------------------------------------------------------  Allergies    No Known Allergies    Intolerances      Standing Inpatient Medications  amLODIPine   Tablet 5 milliGRAM(s) Oral daily  ATENolol  Tablet 50 milliGRAM(s) Oral daily  atorvastatin 40 milliGRAM(s) Oral at bedtime  brimonidine 0.2% Ophthalmic Solution 1 Drop(s) Left EYE three times a day  dextrose 5%. 1000 milliLiter(s) IV Continuous <Continuous>  dextrose 50% Injectable 12.5 Gram(s) IV Push once  dextrose 50% Injectable 25 Gram(s) IV Push once  dextrose 50% Injectable 25 Gram(s) IV Push once  dorzolamide 2%/timolol 0.5% Ophthalmic Solution 1 Drop(s) Left EYE two times a day  erythromycin   Ointment 1 Application(s) Left EYE four times a day  fenofibrate Tablet 145 milliGRAM(s) Oral daily  gabapentin 300 milliGRAM(s) Oral at bedtime  heparin   Injectable 5000 Unit(s) SubCutaneous every 8 hours  insulin glargine Injectable (LANTUS) 10 Unit(s) SubCutaneous at bedtime  insulin glargine Injectable (LANTUS) 10 Unit(s) SubCutaneous every morning  insulin lispro (HumaLOG) corrective regimen sliding scale   SubCutaneous three times a day before meals  levothyroxine 75 MICROGram(s) Oral daily  melatonin 3 milliGRAM(s) Oral at bedtime  mesalamine ER Capsule 500 milliGRAM(s) Oral two times a day  pantoprazole    Tablet 40 milliGRAM(s) Oral before breakfast  polyethylene glycol 3350 17 Gram(s) Oral daily  senna 2 Tablet(s) Oral at bedtime  sodium bicarbonate  Infusion 0.08 mEq/kG/Hr IV Continuous <Continuous>  valACYclovir 500 milliGRAM(s) Oral every 24 hours    PRN Inpatient Medications  acetaminophen   Tablet .. 650 milliGRAM(s) Oral every 4 hours PRN  bisacodyl 5 milliGRAM(s) Oral every 12 hours PRN  dextrose 40% Gel 15 Gram(s) Oral once PRN  glucagon  Injectable 1 milliGRAM(s) IntraMuscular once PRN  haloperidol    Injectable 0.5 milliGRAM(s) IntraMuscular every 6 hours PRN      REVIEW OF SYSTEMS  --------------------------------------------------------------------------------  General: no fever  CVS: no chest pain  RESP: no sob, no cough  ABD: no abdominal pain  : no dysuria  MSK: no edema     VITALS/PHYSICAL EXAM  --------------------------------------------------------------------------------  T(C): 36.6 (08-19-20 @ 05:01), Max: 37.1 (08-18-20 @ 21:53)  HR: 75 (08-19-20 @ 05:01) (73 - 86)  BP: 144/83 (08-19-20 @ 05:01) (134/83 - 154/86)  RR: 17 (08-19-20 @ 05:01) (17 - 18)  SpO2: 96% (08-19-20 @ 05:01) (94% - 99%)  Wt(kg): --        08-18-20 @ 07:01  -  08-19-20 @ 07:00  --------------------------------------------------------  IN: 1310 mL / OUT: 3050 mL / NET: -1740 mL      Physical Exam:  	Gen: NAD  	HEENT: MMM  	Pulm: CTA B/L  	CV: S1S2  	Abd: Soft, +BS  	Ext: No LE edema B/L                      Neuro: Awake   	Skin: Warm and Dry       LABS/STUDIES  --------------------------------------------------------------------------------              11.5   11.22 >-----------<  530      [08-19-20 @ 06:39]              35.6     142  |  96  |  39  ----------------------------<  200      [08-19-20 @ 06:38]  3.0   |  32  |  2.96        Ca     9.2     [08-19-20 @ 06:38]      Creatinine Trend:  SCr 2.96 [08-19 @ 06:38]  SCr 4.27 [08-18 @ 07:32]  SCr 5.71 [08-17 @ 06:28]  SCr 6.80 [08-16 @ 06:48]  SCr 6.61 [08-15 @ 21:03]    Urinalysis - [08-14-20 @ 09:20]      Color Light Yellow / Appearance Clear / SG 1.006 / pH 6.5      Gluc Negative / Ketone Negative  / Bili Negative / Urobili <2 mg/dL       Blood Negative / Protein 30 mg/dL / Leuk Est Negative / Nitrite Negative      RBC 11 / WBC 1 / Hyaline 0 / Gran  / Sq Epi  / Non Sq Epi 1 / Bacteria Negative    Urine Creatinine 25      [08-15-20 @ 12:37]  Urine Sodium 79      [08-15-20 @ 12:37]  Urine Chloride <35      [08-14-20 @ 07:24]  Urine Osmolality 147      [08-14-20 @ 09:20]    TSH 1.29      [08-16-20 @ 09:36]  Lipid: chol 149, , HDL 38, LDL 77      [08-12-20 @ 12:15]    HCV 0.11, Nonreact      [08-13-20 @ 09:04]

## 2020-08-19 NOTE — PROGRESS NOTE ADULT - ASSESSMENT
63 yo female with crohns ,IDDM,hypothyroidism, and HTN admitted with V1 zoster with keratitis  ACV induced KAVIN, starting to improve.renal note appreciated, many additional factors contributing to KAVIN  creat is down to less than 3  Zoster lesions are crusted  Continue valtrex, day 9/10  Hydration per renal and medicine  KAVIN from ACV should be reversable, however as per renal her KAVIN is multifactorial.

## 2020-08-20 ENCOUNTER — TRANSCRIPTION ENCOUNTER (OUTPATIENT)
Age: 65
End: 2020-08-20

## 2020-08-20 LAB
ANION GAP SERPL CALC-SCNC: 10 MMOL/L — SIGNIFICANT CHANGE UP (ref 5–17)
ANION GAP SERPL CALC-SCNC: 14 MMOL/L — SIGNIFICANT CHANGE UP (ref 5–17)
BUN SERPL-MCNC: 23 MG/DL — SIGNIFICANT CHANGE UP (ref 7–23)
BUN SERPL-MCNC: 26 MG/DL — HIGH (ref 7–23)
CALCIUM SERPL-MCNC: 10 MG/DL — SIGNIFICANT CHANGE UP (ref 8.4–10.5)
CALCIUM SERPL-MCNC: 9.3 MG/DL — SIGNIFICANT CHANGE UP (ref 8.4–10.5)
CHLORIDE SERPL-SCNC: 100 MMOL/L — SIGNIFICANT CHANGE UP (ref 96–108)
CHLORIDE SERPL-SCNC: 99 MMOL/L — SIGNIFICANT CHANGE UP (ref 96–108)
CO2 SERPL-SCNC: 27 MMOL/L — SIGNIFICANT CHANGE UP (ref 22–31)
CO2 SERPL-SCNC: 29 MMOL/L — SIGNIFICANT CHANGE UP (ref 22–31)
CREAT SERPL-MCNC: 1.65 MG/DL — HIGH (ref 0.5–1.3)
CREAT SERPL-MCNC: 1.76 MG/DL — HIGH (ref 0.5–1.3)
GLUCOSE BLDC GLUCOMTR-MCNC: 148 MG/DL — HIGH (ref 70–99)
GLUCOSE BLDC GLUCOMTR-MCNC: 158 MG/DL — HIGH (ref 70–99)
GLUCOSE BLDC GLUCOMTR-MCNC: 211 MG/DL — HIGH (ref 70–99)
GLUCOSE BLDC GLUCOMTR-MCNC: 262 MG/DL — HIGH (ref 70–99)
GLUCOSE SERPL-MCNC: 165 MG/DL — HIGH (ref 70–99)
GLUCOSE SERPL-MCNC: 180 MG/DL — HIGH (ref 70–99)
HCT VFR BLD CALC: 36.4 % — SIGNIFICANT CHANGE UP (ref 34.5–45)
HGB BLD-MCNC: 11.8 G/DL — SIGNIFICANT CHANGE UP (ref 11.5–15.5)
MCHC RBC-ENTMCNC: 27.6 PG — SIGNIFICANT CHANGE UP (ref 27–34)
MCHC RBC-ENTMCNC: 32.4 GM/DL — SIGNIFICANT CHANGE UP (ref 32–36)
MCV RBC AUTO: 85.2 FL — SIGNIFICANT CHANGE UP (ref 80–100)
NRBC # BLD: 0 /100 WBCS — SIGNIFICANT CHANGE UP (ref 0–0)
PLATELET # BLD AUTO: 516 K/UL — HIGH (ref 150–400)
POTASSIUM SERPL-MCNC: 2.9 MMOL/L — CRITICAL LOW (ref 3.5–5.3)
POTASSIUM SERPL-MCNC: 3.8 MMOL/L — SIGNIFICANT CHANGE UP (ref 3.5–5.3)
POTASSIUM SERPL-SCNC: 2.9 MMOL/L — CRITICAL LOW (ref 3.5–5.3)
POTASSIUM SERPL-SCNC: 3.8 MMOL/L — SIGNIFICANT CHANGE UP (ref 3.5–5.3)
RBC # BLD: 4.27 M/UL — SIGNIFICANT CHANGE UP (ref 3.8–5.2)
RBC # FLD: 16.5 % — HIGH (ref 10.3–14.5)
SARS-COV-2 RNA SPEC QL NAA+PROBE: SIGNIFICANT CHANGE UP
SODIUM SERPL-SCNC: 139 MMOL/L — SIGNIFICANT CHANGE UP (ref 135–145)
SODIUM SERPL-SCNC: 140 MMOL/L — SIGNIFICANT CHANGE UP (ref 135–145)
WBC # BLD: 11.6 K/UL — HIGH (ref 3.8–10.5)
WBC # FLD AUTO: 11.6 K/UL — HIGH (ref 3.8–10.5)

## 2020-08-20 RX ORDER — VALACYCLOVIR 500 MG/1
1000 TABLET, FILM COATED ORAL ONCE
Refills: 0 | Status: COMPLETED | OUTPATIENT
Start: 2020-08-20 | End: 2020-08-20

## 2020-08-20 RX ORDER — POTASSIUM CHLORIDE 20 MEQ
20 PACKET (EA) ORAL
Refills: 0 | Status: COMPLETED | OUTPATIENT
Start: 2020-08-20 | End: 2020-08-20

## 2020-08-20 RX ORDER — INSULIN LISPRO 100/ML
2 VIAL (ML) SUBCUTANEOUS
Refills: 0 | Status: DISCONTINUED | OUTPATIENT
Start: 2020-08-20 | End: 2020-08-21

## 2020-08-20 RX ORDER — INSULIN LISPRO 100/ML
2 VIAL (ML) SUBCUTANEOUS ONCE
Refills: 0 | Status: DISCONTINUED | OUTPATIENT
Start: 2020-08-20 | End: 2020-08-20

## 2020-08-20 RX ORDER — POTASSIUM CHLORIDE 20 MEQ
20 PACKET (EA) ORAL ONCE
Refills: 0 | Status: COMPLETED | OUTPATIENT
Start: 2020-08-20 | End: 2020-08-20

## 2020-08-20 RX ADMIN — Medication 1 APPLICATION(S): at 01:41

## 2020-08-20 RX ADMIN — Medication 2 UNIT(S): at 13:00

## 2020-08-20 RX ADMIN — Medication 1: at 08:55

## 2020-08-20 RX ADMIN — Medication 20 MILLIEQUIVALENT(S): at 12:57

## 2020-08-20 RX ADMIN — Medication 3 MILLIGRAM(S): at 21:42

## 2020-08-20 RX ADMIN — DORZOLAMIDE HYDROCHLORIDE TIMOLOL MALEATE 1 DROP(S): 20; 5 SOLUTION/ DROPS OPHTHALMIC at 07:09

## 2020-08-20 RX ADMIN — Medication 650 MILLIGRAM(S): at 04:33

## 2020-08-20 RX ADMIN — Medication 650 MILLIGRAM(S): at 03:48

## 2020-08-20 RX ADMIN — GABAPENTIN 300 MILLIGRAM(S): 400 CAPSULE ORAL at 21:41

## 2020-08-20 RX ADMIN — Medication 500 MILLIGRAM(S): at 07:08

## 2020-08-20 RX ADMIN — Medication 1 APPLICATION(S): at 12:58

## 2020-08-20 RX ADMIN — Medication 1 APPLICATION(S): at 07:09

## 2020-08-20 RX ADMIN — Medication 20 MILLIEQUIVALENT(S): at 10:02

## 2020-08-20 RX ADMIN — ATORVASTATIN CALCIUM 40 MILLIGRAM(S): 80 TABLET, FILM COATED ORAL at 21:41

## 2020-08-20 RX ADMIN — SENNA PLUS 2 TABLET(S): 8.6 TABLET ORAL at 21:42

## 2020-08-20 RX ADMIN — HEPARIN SODIUM 5000 UNIT(S): 5000 INJECTION INTRAVENOUS; SUBCUTANEOUS at 21:41

## 2020-08-20 RX ADMIN — INSULIN GLARGINE 10 UNIT(S): 100 INJECTION, SOLUTION SUBCUTANEOUS at 08:55

## 2020-08-20 RX ADMIN — VALACYCLOVIR 1000 MILLIGRAM(S): 500 TABLET, FILM COATED ORAL at 12:58

## 2020-08-20 RX ADMIN — Medication 75 MICROGRAM(S): at 07:07

## 2020-08-20 RX ADMIN — PANTOPRAZOLE SODIUM 40 MILLIGRAM(S): 20 TABLET, DELAYED RELEASE ORAL at 07:07

## 2020-08-20 RX ADMIN — Medication 20 MILLIEQUIVALENT(S): at 13:36

## 2020-08-20 RX ADMIN — Medication 1 APPLICATION(S): at 17:29

## 2020-08-20 RX ADMIN — Medication 145 MILLIGRAM(S): at 12:57

## 2020-08-20 RX ADMIN — Medication 500 MILLIGRAM(S): at 17:29

## 2020-08-20 RX ADMIN — DORZOLAMIDE HYDROCHLORIDE TIMOLOL MALEATE 1 DROP(S): 20; 5 SOLUTION/ DROPS OPHTHALMIC at 17:29

## 2020-08-20 RX ADMIN — AMLODIPINE BESYLATE 5 MILLIGRAM(S): 2.5 TABLET ORAL at 07:07

## 2020-08-20 RX ADMIN — ATENOLOL 50 MILLIGRAM(S): 25 TABLET ORAL at 07:07

## 2020-08-20 RX ADMIN — BRIMONIDINE TARTRATE 1 DROP(S): 2 SOLUTION/ DROPS OPHTHALMIC at 07:10

## 2020-08-20 RX ADMIN — INSULIN GLARGINE 10 UNIT(S): 100 INJECTION, SOLUTION SUBCUTANEOUS at 21:47

## 2020-08-20 RX ADMIN — HEPARIN SODIUM 5000 UNIT(S): 5000 INJECTION INTRAVENOUS; SUBCUTANEOUS at 07:08

## 2020-08-20 RX ADMIN — HEPARIN SODIUM 5000 UNIT(S): 5000 INJECTION INTRAVENOUS; SUBCUTANEOUS at 13:03

## 2020-08-20 RX ADMIN — Medication 2 UNIT(S): at 17:28

## 2020-08-20 RX ADMIN — Medication 3: at 13:00

## 2020-08-20 NOTE — PROGRESS NOTE ADULT - SUBJECTIVE AND OBJECTIVE BOX
[facility] DEPARTMENT OF OPHTHALMOLOGY  ------------------------------------------------------------------------------  Gunner Alexandre MD PGY-3  Pager: [phone]/[facility]: [phone]  ------------------------------------------------------------------------------    Interval History: No acute events overnight. Today patient denying blurred vision, eye pain, flashes, floaters, FBS, erythema, or discharge. Feels like eyes are getting better.     MEDICATIONS  (STANDING):  amLODIPine   Tablet 5 milliGRAM(s) Oral daily  ATENolol  Tablet 50 milliGRAM(s) Oral daily  atorvastatin 40 milliGRAM(s) Oral at bedtime  brimonidine 0.2% Ophthalmic Solution 1 Drop(s) Left EYE three times a day  dextrose 5%. 1000 milliLiter(s) (50 mL/Hr) IV Continuous <Continuous>  dextrose 50% Injectable 12.5 Gram(s) IV Push once  dextrose 50% Injectable 25 Gram(s) IV Push once  dextrose 50% Injectable 25 Gram(s) IV Push once  dorzolamide 2%/timolol 0.5% Ophthalmic Solution 1 Drop(s) Left EYE two times a day  erythromycin   Ointment 1 Application(s) Left EYE four times a day  fenofibrate Tablet 145 milliGRAM(s) Oral daily  gabapentin 300 milliGRAM(s) Oral at bedtime  heparin   Injectable 5000 Unit(s) SubCutaneous every 8 hours  insulin glargine Injectable (LANTUS) 10 Unit(s) SubCutaneous at bedtime  insulin glargine Injectable (LANTUS) 10 Unit(s) SubCutaneous every morning  insulin lispro (HumaLOG) corrective regimen sliding scale   SubCutaneous three times a day before meals  levothyroxine 75 MICROGram(s) Oral daily  melatonin 3 milliGRAM(s) Oral at bedtime  mesalamine ER Capsule 500 milliGRAM(s) Oral two times a day  pantoprazole    Tablet 40 milliGRAM(s) Oral before breakfast  polyethylene glycol 3350 17 Gram(s) Oral daily  potassium chloride    Tablet ER 20 milliEquivalent(s) Oral every 2 hours  senna 2 Tablet(s) Oral at bedtime  valACYclovir 1000 milliGRAM(s) Oral once    MEDICATIONS  (PRN):  acetaminophen   Tablet .. 650 milliGRAM(s) Oral every 4 hours PRN Mild Pain (1 - 3)  bisacodyl 5 milliGRAM(s) Oral every 12 hours PRN Constipation  dextrose 40% Gel 15 Gram(s) Oral once PRN Blood Glucose LESS THAN 70 milliGRAM(s)/deciliter  glucagon  Injectable 1 milliGRAM(s) IntraMuscular once PRN Glucose LESS THAN 70 milligrams/deciliter      VITALS: T(C): 36.4 (08-20-20 @ 06:27)  T(F): 97.5 (08-20-20 @ 06:27), Max: 98 (08-19-20 @ 12:07)  HR: 66 (08-20-20 @ 06:27) (66 - 76)  BP: 161/84 (08-20-20 @ 06:27) (149/76 - 165/80)  RR:  (17 - 18)  SpO2:  (93% - 97%)  Wt(kg): --  General: AAO x 3, appropriate mood and affect    Ophthalmology Exam:  Visual acuity (sc): 20/20 OD; 20/30 OS  Pupils: PERRL OU, no APD  Ttono: 17 OD, 16 OS  Extraocular movements (EOMs): Full OU, no pain, no diplopia  Confrontational Visual Field (CVF): Full OU    Pen Light Exam (PLE)  External: Flat OD, weeping vesicles and crusting over lesions OS  Lids/Lashes/Lacrimal Ducts: Flat OD weeping vesicles over left upper lid OS  Sclera/Conjunctiva: W+Q OD 1+ injection OS  Cornea: Cl OU, no SPK today  Anterior Chamber: D+F OU    Iris: Flat OU  Lens: Cl OU

## 2020-08-20 NOTE — DISCHARGE NOTE PROVIDER - NSDCCAREPROVSEEN_GEN_ALL_CORE_FT
Randolph Ware Hoorbod  [facility] Ophthalmology, Team Sorin Dozierorbanastasia Delshadfar  Hoorbod Delshadfar  Hoorbod Delshadfar  Advance PracticeTeam [facility] Medicine  Hoorbod Delshadfar  Hoorbod Delshadfar  Team [facility] Ophthalmology  Camille Daly  Ordering Physician  Deja Mcrae  Uribesiddharth Goldman

## 2020-08-20 NOTE — DISCHARGE NOTE PROVIDER - HOSPITAL COURSE
63 yo woman with a pmhx of Insulin dependent DM, Hypothyroidism, HLD, HTN, neuropathy, Crohn's disease on immunosuppressants admitted for zoster opthalmicus, completed course of valtrex. Hospital course complicated by KAVIN secondary to hyperglycemia, ACE, Ketorolac, Acyclovir and rentention s/p Delvalle. Delvalle removed with adequate urine output. PT recommending PENNY but patient refuses. Stable to discharge home and to follow up with nephrologist Dr Reyes and urology.

## 2020-08-20 NOTE — DISCHARGE NOTE PROVIDER - NSDCCPCAREPLAN_GEN_ALL_CORE_FT
PRINCIPAL DISCHARGE DIAGNOSIS  Diagnosis: Zoster ophthalmicus  Assessment and Plan of Treatment:       SECONDARY DISCHARGE DIAGNOSES  Diagnosis: KAVIN (acute kidney injury)  Assessment and Plan of Treatment:     Diagnosis: Type 2 diabetes mellitus with other specified complication, with long-term current use of insulin  Assessment and Plan of Treatment: PRINCIPAL DISCHARGE DIAGNOSIS  Diagnosis: Zoster ophthalmicus  Assessment and Plan of Treatment: Complete course of valtrex.  Follow up with [facility] Department of Ophthalmology within 1 week of after discharge, sooner if symptoms worsen or change.      SECONDARY DISCHARGE DIAGNOSES  Diagnosis: KAVIN (acute kidney injury)  Assessment and Plan of Treatment:   Delvalle placed for urinary retention, removed 8/21.  Follow up with nephrology and urology as outpatient.    Diagnosis: Type 2 diabetes mellitus with other specified complication, with long-term current use of insulin  Assessment and Plan of Treatment: Make sure you get your HgA1c checked every three months.  If you take oral diabetes medications, check your blood glucose two times a day.  If you take insulin, check your blood glucose before meals and at bedtime.  It is important not to skip any meals.  Keep a log of your blood glucose results and always take it with you to your doctor appointments.  Keep a list of your current medications including injectables and over the counter medications and bring this medication list with you to all your doctor appointments.  If you have not seen your ophthalmologist this year, call for appointment.  Check your feet daily for redness, sores, or openings. Do not self treat. If no improvement in two days call your primary care physician for an appointment.  Low blood sugar (hypoglycemia) is a blood sugar below 70mg/dl. Check your blood sugar if you feel signs/symptoms of hypoglycemia. If your blood sugar is below 70 take 15 grams of carbohydrates (ex 4 oz of apple juice, 3-4 glucose tablets, or 4-6 oz of regular soda) wait 15 minutes and repeat blood sugar to make sure it comes up above 70.  If your blood sugar is above 70 and you are due for a meal, have a meal.  If you are not due for a meal have a snack.  This snack helps keeps your blood sugar at a safe range.

## 2020-08-20 NOTE — PROGRESS NOTE ADULT - ASSESSMENT
_________________________________________________________________________________________  ========>>  M E D I C A L   A T T E N D I N G    F O L L O W  U P  N O T E  <<=========  -----------------------------------------------------------------------------------------------------    - Patient seen and examined by me earlier today.   - In summary,  COLBY SAAB is a 64y year old woman who originally presented with facial pain   - Patient today overall doing much better comfortable, eating better, drinking better, good urine output , had several BMs       ==================>> REVIEW OF SYSTEM <<=================    GEN: no fever, no chills, pain improved overall , feels better  RESP: no SOB, no cough, no sputum  CVS: no chest pain, no palpitations  GI: no abdominal pain, no nausea, + several soft BMs since yesterday   : no issues with forbes   Neuro: no headache, no dizziness  Derm : no itching, no rash    ==================>> PHYSICAL EXAM <<=================    GEN: A&O X 3 , NAD , comfortable, MS has improved   HEENT: PERRL, MMM, hearing intact, injected conjunctiva,  facial zoster starting crusted over >> overall improved   Neck: supple , no JVD appreciated  CVS: S1S2 , regular , No M/R/G appreciated  PULM: CTA B/L,  no W/R/R appreciated  ABD.: soft. non tender, non distended,  bowel sounds present, obese   Extrem: intact pulses , no edema      + forbes with good urine output   PSYCH : normal mood,  not anxious      ==================>> MEDICATIONS <<====================    amLODIPine   Tablet 5 milliGRAM(s) Oral daily  ATENolol  Tablet 50 milliGRAM(s) Oral daily  atorvastatin 40 milliGRAM(s) Oral at bedtime  dextrose 5%. 1000 milliLiter(s) IV Continuous <Continuous>  dextrose 50% Injectable 12.5 Gram(s) IV Push once  dextrose 50% Injectable 25 Gram(s) IV Push once  dextrose 50% Injectable 25 Gram(s) IV Push once  dorzolamide 2%/timolol 0.5% Ophthalmic Solution 1 Drop(s) Left EYE two times a day  erythromycin   Ointment 1 Application(s) Left EYE four times a day  fenofibrate Tablet 145 milliGRAM(s) Oral daily  gabapentin 300 milliGRAM(s) Oral at bedtime  heparin   Injectable 5000 Unit(s) SubCutaneous every 8 hours  insulin glargine Injectable (LANTUS) 10 Unit(s) SubCutaneous at bedtime  insulin glargine Injectable (LANTUS) 10 Unit(s) SubCutaneous every morning  insulin lispro (HumaLOG) corrective regimen sliding scale   SubCutaneous three times a day before meals  insulin lispro Injectable (HumaLOG) 2 Unit(s) SubCutaneous three times a day before meals  levothyroxine 75 MICROGram(s) Oral daily  melatonin 3 milliGRAM(s) Oral at bedtime  mesalamine ER Capsule 500 milliGRAM(s) Oral two times a day  pantoprazole    Tablet 40 milliGRAM(s) Oral before breakfast  polyethylene glycol 3350 17 Gram(s) Oral daily  senna 2 Tablet(s) Oral at bedtime    MEDICATIONS  (PRN):  acetaminophen   Tablet .. 650 milliGRAM(s) Oral every 4 hours PRN Mild Pain (1 - 3)  bisacodyl 5 milliGRAM(s) Oral every 12 hours PRN Constipation  dextrose 40% Gel 15 Gram(s) Oral once PRN Blood Glucose LESS THAN 70 milliGRAM(s)/deciliter  glucagon  Injectable 1 milliGRAM(s) IntraMuscular once PRN Glucose LESS THAN 70 milligrams/deciliter    ___________  Active diet:  Diet, Soft:   Consistent Carbohydrate No Snacks (CSTCHO)  Lacto-Ovo Veg (Accepts Milk Prod., Eggs)  Supplement Feeding Modality:  Oral  Probiotic Yogurt/Smoothie Cans or Servings Per Day:  1       Frequency:  Two Times a day  ___________________    ==================>> VITAL SIGNS <<==================    Vital Signs Last 24 HrsT(C): 36.6 (08-20-20 @ 11:46)  T(F): 97.9 (08-20-20 @ 11:46), Max: 98 (08-20-20 @ 02:26)  HR: 69 (08-20-20 @ 11:46) (66 - 76)  BP: 134/82 (08-20-20 @ 11:46)  RR: 18 (08-20-20 @ 11:46) (17 - 18)  SpO2: 95% (08-20-20 @ 11:46) (93% - 97%)      POCT Blood Glucose.: 262 mg/dL (20 Aug 2020 12:09)  POCT Blood Glucose.: 158 mg/dL (20 Aug 2020 08:13)  POCT Blood Glucose.: 202 mg/dL (19 Aug 2020 22:37)  POCT Blood Glucose.: 227 mg/dL (19 Aug 2020 21:23)  POCT Blood Glucose.: 183 mg/dL (19 Aug 2020 17:05)     ==================>> LAB AND IMAGING <<==================                        11.8   11.60 )-----------( 516      ( 20 Aug 2020 07:12 )             36.4        08-20    140  |  99  |  26<H>  ----------------------------<  165<H>  2.9<LL>   |  27  |  1.76<H>    Ca    9.3      20 Aug 2020 07:12      WBC count:   11.60 <<== ,  11.22 <<== ,  9.62 <<== ,  9.80 <<== ,  12.36 <<== ,  13.67 <<==   Hemoglobin:   11.8 <<==,  11.5 <<==,  11.6 <<==,  11.6 <<==,  10.8 <<==,  13.1 <<==  platelets:  516 <==, 530 <==, 476 <==, 434 <==, 358 <==, 375 <==, 303 <==    Creatinine:  1.76  <<==, 2.96  <<==, 4.27  <<==, 5.71  <<==, 6.80  <<==, 6.61  <<==  Sodium:   140  <==, 142  <==, 142  <==, 136  <==, 133  <==, 130  <==, 130  <==    ___________________________________________________________________________________  ===============>>  A S S E S S M E N T   A N D   P L A N <<===============  ------------------------------------------------------------------------------------------    · Assessment		  65 yo woman with a pmhx of Insulin dependent DM, Hypothyroidism, HLD, HTN, neuropathy, Crohn's disease on immunosuppressants present to the ED with eye redness and pain, and rash that started about 5 days prior       Problem/Plan - :  ·  Problem: Acute Hypoxic Respiratory Failure resolved   - Patient doing well, on low rate O2 via NC > wean off to and monitor on RA as able   post diuretics and Bipap wit good results  monitor  clinically closely  OOB to chair encouraged     Problem/Plan - :  ·  Problem: KAVIN improving   possibly due to Acyclovir and urinary retention   pt likely with baseline diabetic nephropathy  held Acyclovir  AVOID all NSAID use  held ACEI  Avoid nephrotoxic medications.   nephrology appreciated   sono as noted above / no hydro   monitor BMP  Forbes : monitor output  >> trial of void in AM + OOB to chair     Problem/Plan - 1:  ·  Problem: Zoster ophthalmicus, facial shingles, conjunctivitis..   overall improved  ophtho and ID following, appreciated  finish valacyclovir as planned by ID   ointment and drops as ordered / adjusted   supportive care  pain mgmt.     Problem/Plan - 2:  ·  Problem: Type 2 diabetes mellitus   lantus decreased due to hypoglycemia   RISS  monitor FS and adjust meds as needed for better glycemic control   A1C 7.5  Neurontin for diabetic neuropathy.     Problem/Plan - 3:  ·  Problem:  HTN / HLD   Continue Current medications and monitor.     Problem/Plan - 4:  Problem: Class 3 severe obesity due to excess calories with serious comorbidity and body mass index (BMI) of 40.0 to 44.9 in adult. Plan: nutrition consult  pt should get a sleep study as OP >> d/w pt     Problem/Plan - 5:  ·  Problem: h/o Crohn's disease   Balsalazide not formulary > changed to Mesalamine   monitor.     Problem/Plan - 6:  ·  Problem: Need for prophylactic measure.    heparin   Protonix.   OOB to chair, PT    possibly DC plan to rehab in process     --------------------------------------------  Case discussed with pt, son, RN...   Education given on findings and plan of care  ___________________________  H. JOÃO Forman.  Pager: [phone]

## 2020-08-20 NOTE — PHARMACOTHERAPY INTERVENTION NOTE - COMMENTS
Recommended to give an additional supplemental potassium dose after morning potassium level of 2.9 and only 20 mEq given this AM. Also recommended to add 2 units of premeal insulin given elevated BG readings in the setting of KAVIN improvement.    Barbara Olivier, PharmD  PGY-1 Pharmacy Resident  [phone]

## 2020-08-20 NOTE — PROVIDER CONTACT NOTE (CRITICAL VALUE NOTIFICATION) - ASSESSMENT
this test was from the am routine labs.  stat was glucose 39 and we already fixed it.  d50 given and >100 x 2.
alert and oriented x4--  no distress noted at present
lactate 2.9

## 2020-08-20 NOTE — PROGRESS NOTE ADULT - SUBJECTIVE AND OBJECTIVE BOX
[facility]   MD Yobany Dean MD, D.O. Ruoru Wong, PA    From 7 AM - 5 PM:  OFFICE: [phone]  Dr. Ware cell: [phone]  Dr. Reyes cell: [phone]  Dr. Lincoln cell: [phone]  PAOLA Tejada cell: [phone]    From 5 PM - 7 AM: Answering Service: [phone]      RENAL FOLLOW UP NOTE  --------------------------------------------------------------------------------  HPI: Pt seen and examined at bedside.       PAST HISTORY  --------------------------------------------------------------------------------  No significant changes to PMH, PSH, FHx, SHx, unless otherwise noted    ALLERGIES & MEDICATIONS  --------------------------------------------------------------------------------  Allergies    No Known Allergies    Intolerances      Standing Inpatient Medications  amLODIPine   Tablet 5 milliGRAM(s) Oral daily  ATENolol  Tablet 50 milliGRAM(s) Oral daily  atorvastatin 40 milliGRAM(s) Oral at bedtime  brimonidine 0.2% Ophthalmic Solution 1 Drop(s) Left EYE three times a day  dextrose 5%. 1000 milliLiter(s) IV Continuous <Continuous>  dextrose 50% Injectable 12.5 Gram(s) IV Push once  dextrose 50% Injectable 25 Gram(s) IV Push once  dextrose 50% Injectable 25 Gram(s) IV Push once  dorzolamide 2%/timolol 0.5% Ophthalmic Solution 1 Drop(s) Left EYE two times a day  erythromycin   Ointment 1 Application(s) Left EYE four times a day  fenofibrate Tablet 145 milliGRAM(s) Oral daily  gabapentin 300 milliGRAM(s) Oral at bedtime  heparin   Injectable 5000 Unit(s) SubCutaneous every 8 hours  insulin glargine Injectable (LANTUS) 10 Unit(s) SubCutaneous at bedtime  insulin glargine Injectable (LANTUS) 10 Unit(s) SubCutaneous every morning  insulin lispro (HumaLOG) corrective regimen sliding scale   SubCutaneous three times a day before meals  levothyroxine 75 MICROGram(s) Oral daily  melatonin 3 milliGRAM(s) Oral at bedtime  mesalamine ER Capsule 500 milliGRAM(s) Oral two times a day  pantoprazole    Tablet 40 milliGRAM(s) Oral before breakfast  polyethylene glycol 3350 17 Gram(s) Oral daily  potassium chloride    Tablet ER 20 milliEquivalent(s) Oral every 2 hours  senna 2 Tablet(s) Oral at bedtime  valACYclovir 1000 milliGRAM(s) Oral once    PRN Inpatient Medications  acetaminophen   Tablet .. 650 milliGRAM(s) Oral every 4 hours PRN  bisacodyl 5 milliGRAM(s) Oral every 12 hours PRN  dextrose 40% Gel 15 Gram(s) Oral once PRN  glucagon  Injectable 1 milliGRAM(s) IntraMuscular once PRN      REVIEW OF SYSTEMS  --------------------------------------------------------------------------------  General: no fever  CVS: no chest pain  RESP: no sob, no cough  ABD: no abdominal pain  : no dysuria  MSK: no edema     VITALS/PHYSICAL EXAM  --------------------------------------------------------------------------------  T(C): 36.4 (08-20-20 @ 06:27), Max: 36.7 (08-19-20 @ 12:07)  HR: 66 (08-20-20 @ 06:27) (66 - 76)  BP: 161/84 (08-20-20 @ 06:27) (149/76 - 165/80)  RR: 18 (08-20-20 @ 06:27) (17 - 18)  SpO2: 97% (08-20-20 @ 06:27) (93% - 97%)  Wt(kg): --        08-19-20 @ 07:01  -  08-20-20 @ 07:00  --------------------------------------------------------  IN: 480 mL / OUT: 3800 mL / NET: -3320 mL      Physical Exam:  	Gen: NAD  	HEENT: MMM  	Pulm: CTA B/L  	CV: S1S2  	Abd: Soft, +BS  	Ext: No LE edema B/L                      Neuro: Awake   	Skin: Warm and Dry   	    LABS/STUDIES  --------------------------------------------------------------------------------              11.8   11.60 >-----------<  516      [08-20-20 @ 07:12]              36.4     140  |  99  |  26  ----------------------------<  165      [08-20-20 @ 07:12]  2.9   |  27  |  1.76        Ca     9.3     [08-20-20 @ 07:12]      Creatinine Trend:  SCr 1.76 [08-20 @ 07:12]  SCr 2.96 [08-19 @ 06:38]  SCr 4.27 [08-18 @ 07:32]  SCr 5.71 [08-17 @ 06:28]  SCr 6.80 [08-16 @ 06:48]    Urinalysis - [08-14-20 @ 09:20]      Color Light Yellow / Appearance Clear / SG 1.006 / pH 6.5      Gluc Negative / Ketone Negative  / Bili Negative / Urobili <2 mg/dL       Blood Negative / Protein 30 mg/dL / Leuk Est Negative / Nitrite Negative      RBC 11 / WBC 1 / Hyaline 0 / Gran  / Sq Epi  / Non Sq Epi 1 / Bacteria Negative    Urine Creatinine 25      [08-15-20 @ 12:37]  Urine Sodium 79      [08-15-20 @ 12:37]  Urine Chloride <35      [08-14-20 @ 07:24]  Urine Osmolality 147      [08-14-20 @ 09:20]    TSH 1.29      [08-16-20 @ 09:36]  Lipid: chol 149, , HDL 38, LDL 77      [08-12-20 @ 12:15]    HCV 0.11, Nonreact      [08-13-20 @ 09:04]

## 2020-08-20 NOTE — DISCHARGE NOTE PROVIDER - PROVIDER TOKENS
PROVIDER:[TOKEN:[[id number]],FOLLOWUP:[1 week],ESTABLISHEDPATIENT:[T]],FREE:[LAST:[Urology],PHONE:[phone]],FAX:[(   )    -],ADDRESS:[[facility]  [address] [place] [address]]] PROVIDER:[TOKEN:[[id number]],FOLLOWUP:[1 week],ESTABLISHEDPATIENT:[T]],FREE:[LAST:[Urology],PHONE:[phone]],FAX:[(   )    -],ADDRESS:[[facility]  [address] [place] [address]]],FREE:[LAST:[[place]],FIRST:[Hillary],PHONE:[phone]],FAX:[(   )    -],ADDRESS:[[address] [place] [address] [address], [place] [address]]]

## 2020-08-20 NOTE — DISCHARGE NOTE PROVIDER - NSFOLLOWUPCLINICS_GEN_ALL_ED_FT
[facility] - Ophthalmology  Ophthalmology  [address]  [place] [address]  Phone: [phone]  Fax:   Follow Up Time: 1 week

## 2020-08-20 NOTE — PROGRESS NOTE ADULT - SUBJECTIVE AND OBJECTIVE BOX
CC: f/u for V1 zoster    Patient reports she has pain over the left forehead    REVIEW OF SYSTEMS:  All other review of systems negative (Comprehensive ROS)    Antimicrobials Day #  :10/10  valACYclovir 1000 milliGRAM(s) Oral once    Other Medications Reviewed    T(F): 97.5 (08-20-20 @ 06:27), Max: 98 (08-19-20 @ 12:07)  HR: 66 (08-20-20 @ 06:27)  BP: 161/84 (08-20-20 @ 06:27)  RR: 18 (08-20-20 @ 06:27)  SpO2: 97% (08-20-20 @ 06:27)  Wt(kg): --    PHYSICAL EXAM:  General: alert, no acute distress  Eyes:  anicteric, no conjunctival injection, no discharge  Oropharynx: no lesions or injection 	  Neck: supple, without adenopathy  Lungs: clear to auscultation  Heart: regular rate and rhythm; no murmur, rubs or gallops  Abdomen: soft, nondistended, nontender, without mass or organomegaly  Skin: crusted left eye area lesions  Extremities: no clubbing, cyanosis, or edema  Neurologic: alert, oriented, moves all extremities    LAB RESULTS:                        11.8   11.60 )-----------( 516      ( 20 Aug 2020 07:12 )             36.4     08-20    140  |  99  |  26<H>  ----------------------------<  165<H>  2.9<LL>   |  27  |  1.76<H>    Ca    9.3      20 Aug 2020 07:12          MICROBIOLOGY:  RECENT CULTURES:      RADIOLOGY REVIEWED:  < from: CT Chest No Cont (08.16.20 @ 09:11) >    EXAM:  CT CHEST                            PROCEDURE DATE:  08/16/2020            INTERPRETATION:  CLINICAL INFORMATION: Hypoxia.    COMPARISON: Chest radiograph 8/15/2020.    PROCEDURE:  CT of the Chest was performed without intravenous contrast.  Sagittal and coronal reformats were performed.    FINDINGS:    LUNGS AND AIRWAYS: Patent central airways.  Bilateral patchy groundglass and dense opacities with interlobular septal thickening.  PLEURA: Bilateral small pleural effusions.  MEDIASTINUM AND KEN: No lymphadenopathy.  VESSELS: Within normal limits.  HEART: Heart size is enlarged. No pericardial effusion.  CHEST WALL AND LOWER NECK: Within normal limits.  VISUALIZED UPPER ABDOMEN: Within normal limits.  BONES: Degenerative changes.    IMPRESSION:  Findings suggestive of pulmonary edema.    Bilateral small pleural effusions.      < end of copied text >        Assessment:  Patient with V1 zoster, keratitis, developed kim from meds and retention now much improved, zoster is crusted, suspect post herpetic neuralgia giving the headache  Plan:  finish valtrex today and stop

## 2020-08-20 NOTE — PROGRESS NOTE ADULT - ASSESSMENT
63 yo woman with a pmhx of Insulin dependent DM, Hypothyroidism, HLD, HTN, neuropathy, Crohn's disease on immunosuppressants admitted for zoster opthalmicus found to have KIERA    Kiera   scr 0.8 on 8/12  Scr improving daily  KIERA multifactorial -- sec to hyperglycmia, ACE, Ketorolac, Acyclovir and rentention   s/p bladder scan with 700cc urine --s/p forbes. Pt remains non-oliguric   continue to HOLD lisinopril (last dose on 8/13)  Optimize glucose control  AVOID all NSAID use (s/p ketorolac on 8/11)  Acyclovir on hold,- -last dose on 8/12  FENa >1 suggest ATN, Renal US is unremarkable  Urine has minimal protein, RBC + but has forbes  Monitor at present  Use bumex 2mg IV one dose and PRN in view of pulmonary edema in CT chest. S/p Bumex IV as needed   No emergent indication for RRT at present  Consent obtained for RRT if needed  Monitor BMP   AVoid further nephrotoxics, NSAIDS RCA    Hypokalemia  Advise replacement until K>3.5    Acidosis:  AG + Non-AG  s/p sodium bicarb drip     HTN  BP elevated   Hold lisinopril  can titrate up amlodipine if needed   Monitor BP  Low salt diet     Hyponatremia  etiology? hyperglycemia contributing  Work up suggest polydipsia/liquid diet intake  Na improved today   Monitor at present

## 2020-08-20 NOTE — DISCHARGE NOTE PROVIDER - CARE PROVIDERS DIRECT ADDRESSES
,[email or web address] a [email or web address],DirectAddress_Unknown ,[email or web address] East Valley Rehabilitation HospitalPhunware,DirectAddress_Unknown,DirectAddress_Unknown

## 2020-08-20 NOTE — PROGRESS NOTE ADULT - ASSESSMENT
Detail Level: Zone Assessment and Recommendations:  64y female w/ pmhx/ochx of Crohns on immunosuppresion, DM, HTN, HLD consulted for zoster of V1 distribution found to have early signs of zoster keratitis, in addition to conjunctivitis and slightly elevated pressure suggestive of a trabeculitis. C/w recommend treatment as below for zoster ophthalmicus of the left eye.  No evidence of uveitis at this time, so will hold off on steroids, however may add if IOP not controlled and corneal findings improve.    1. Herpes zoster ophthalmicus, Left eye  - continue with systemic antiviral as per primary team / ID    - for conjunctivitis may c/w erythromycin ophthalmic ointment QID to the left eye  - may also use erythromycin ointment for eyelid lesions around the eye  - c/w dorzolomide/timolol (COSOPT) eyedrops BID to the left eye  - stop brimonidine eyedrops TID to the left eye  - will reassess IOP as outpatient to determine need for IOP gtts    Outpatient follow-up: Patient should follow-up with his/her ophthalmologist or with [facility] Department of Ophthalmology within 1 week of after discharge, sooner if symptoms worsen or change at:    [address]  [place] [address]  [phone]    Quyen Florentino MD, PGY-III  Pager: [phone]/[facility]: [id number]  Also available on Microsoft Teams Assessment and Recommendations:  64y female w/ pmhx/ochx of Crohns on immunosuppresion, DM, HTN, HLD consulted for zoster of V1 distribution found to have early signs of zoster keratitis, in addition to conjunctivitis and slightly elevated pressure suggestive of a trabeculitis. C/w recommend treatment as below for zoster ophthalmicus of the left eye.  No evidence of uveitis at this time, so will hold off on steroids, however may add if IOP not controlled and corneal findings improve.    1. Herpes zoster ophthalmicus, Left eye  - continue with systemic antiviral as per primary team / ID    - for conjunctivitis may c/w erythromycin ophthalmic ointment QID to the left eye  - may also use erythromycin ointment for eyelid lesions around the eye  - c/w dorzolomide/timolol (COSOPT) eyedrops BID to the left eye  - stop brimonidine eyedrops TID to the left eye  - will reassess IOP as outpatient to determine need for IOP gtts    Outpatient follow-up: Patient should follow-up with his/her ophthalmologist or with [facility] Department of Ophthalmology within 1 week of after discharge, sooner if symptoms worsen or change at:    [address] [facility] [facility]  [place] [address]  [phone]    Gunner Alexandre MD PGY-3  Pager: [phone]/[facility]: [id number]

## 2020-08-20 NOTE — DISCHARGE NOTE PROVIDER - CARE PROVIDER_API CALL
Yobany Reyes  INTERNAL MEDICINE  [address] [place] [address]  Phone: [phone]  Fax: [phone]  Established Patient  Follow Up Time: 1 week    Urology,   [facility]  [address] [place] [address]  Phone: [phone]  Fax: (   )    -  Follow Up Time: Yobany Reyes  INTERNAL MEDICINE  [address] [place] [address]  Phone: [phone]  Fax: [phone]  Established Patient  Follow Up Time: 1 week    Urology,   [facility]  [address] [place] [address]  Phone: [phone]  Fax: (   )    -  Follow Up Time:     Hillary Elizalde  [address], [place] [address]  Phone: [phone]  Fax: (   )    -  Follow Up Time:

## 2020-08-20 NOTE — DISCHARGE NOTE PROVIDER - NSDCMRMEDTOKEN_GEN_ALL_CORE_FT
amLODIPine 5 mg oral tablet: 1 tab(s) orally once a day  atenolol 50 mg oral tablet: 1 tab(s) orally once a day  balsalazide 750 mg oral capsule: 1 cap(s) orally 2 times a day  Basaglar KwikPen 100 units/mL subcutaneous solution: takes 40/20 at home AM/PM !!  celecoxib 100 mg oral capsule: 1 cap(s) orally 2 times a day, As Needed (for knee pain)  gabapentin 300 mg oral capsule: 1 cap(s) orally 2 times a day  Lipitor 40 mg oral tablet: 1 tab(s) orally once a day  lisinopril 40 mg oral tablet: 1 tab(s) orally once a day  metFORMIN 500 mg oral tablet: 1 tab(s) orally 2 times a day  NovoLOG Mix 70/30 subcutaneous suspension: 30 unit(s) subcutaneous once a day  Ozempic (1 mg dose) 2 mg/1.5 mL subcutaneous solution: 1 application subcutaneous once a week  Synthroid 75 mcg (0.075 mg) oral tablet: 1 tab(s) orally once a day  TriCor 145 mg oral tablet: 1 tab(s) orally once a day amLODIPine 5 mg oral tablet: 1 tab(s) orally once a day  atenolol 50 mg oral tablet: 1 tab(s) orally once a day  balsalazide 750 mg oral capsule: 1 cap(s) orally 2 times a day  Basaglar KwikPen 100 units/mL subcutaneous solution: takes 40/20 at home AM/PM !!  bisacodyl 5 mg oral delayed release tablet: 1 tab(s) orally every 12 hours, As needed, Constipation  dorzolamide-timolol 2%-0.5% preservative-free ophthalmic solution: 1 drop(s) to each affected eye 2 times a day  erythromycin 0.5% ophthalmic ointment: 1 application to each affected eye 4 times a day  gabapentin 300 mg oral capsule: 1 cap(s) orally once a day (at bedtime)  Lipitor 40 mg oral tablet: 1 tab(s) orally once a day  melatonin 3 mg oral tablet: 1 tab(s) orally once a day (at bedtime)  metFORMIN 500 mg oral tablet: 1 tab(s) orally 2 times a day  NovoLOG Mix 70/30 subcutaneous suspension: 30 unit(s) subcutaneous once a day  Ozempic (1 mg dose) 2 mg/1.5 mL subcutaneous solution: 1 application subcutaneous once a week  pantoprazole 40 mg oral delayed release tablet: 1 tab(s) orally once a day (before a meal)  polyethylene glycol 3350 oral powder for reconstitution: 17 gram(s) orally once a day  senna oral tablet: 2 tab(s) orally once a day (at bedtime)  Synthroid 75 mcg (0.075 mg) oral tablet: 1 tab(s) orally once a day  TriCor 145 mg oral tablet: 1 tab(s) orally once a day

## 2020-08-21 VITALS
RESPIRATION RATE: 17 BRPM | HEART RATE: 68 BPM | OXYGEN SATURATION: 95 % | DIASTOLIC BLOOD PRESSURE: 82 MMHG | TEMPERATURE: 97 F | SYSTOLIC BLOOD PRESSURE: 148 MMHG

## 2020-08-21 LAB
ANION GAP SERPL CALC-SCNC: 12 MMOL/L — SIGNIFICANT CHANGE UP (ref 5–17)
BUN SERPL-MCNC: 21 MG/DL — SIGNIFICANT CHANGE UP (ref 7–23)
CALCIUM SERPL-MCNC: 9.8 MG/DL — SIGNIFICANT CHANGE UP (ref 8.4–10.5)
CHLORIDE SERPL-SCNC: 103 MMOL/L — SIGNIFICANT CHANGE UP (ref 96–108)
CO2 SERPL-SCNC: 25 MMOL/L — SIGNIFICANT CHANGE UP (ref 22–31)
CREAT SERPL-MCNC: 1.41 MG/DL — HIGH (ref 0.5–1.3)
GLUCOSE BLDC GLUCOMTR-MCNC: 172 MG/DL — HIGH (ref 70–99)
GLUCOSE BLDC GLUCOMTR-MCNC: 253 MG/DL — HIGH (ref 70–99)
GLUCOSE SERPL-MCNC: 170 MG/DL — HIGH (ref 70–99)
HCT VFR BLD CALC: 38.2 % — SIGNIFICANT CHANGE UP (ref 34.5–45)
HGB BLD-MCNC: 12 G/DL — SIGNIFICANT CHANGE UP (ref 11.5–15.5)
MCHC RBC-ENTMCNC: 27 PG — SIGNIFICANT CHANGE UP (ref 27–34)
MCHC RBC-ENTMCNC: 31.4 GM/DL — LOW (ref 32–36)
MCV RBC AUTO: 85.8 FL — SIGNIFICANT CHANGE UP (ref 80–100)
NRBC # BLD: 0 /100 WBCS — SIGNIFICANT CHANGE UP (ref 0–0)
PLATELET # BLD AUTO: 468 K/UL — HIGH (ref 150–400)
POTASSIUM SERPL-MCNC: 3.3 MMOL/L — LOW (ref 3.5–5.3)
POTASSIUM SERPL-SCNC: 3.3 MMOL/L — LOW (ref 3.5–5.3)
RBC # BLD: 4.45 M/UL — SIGNIFICANT CHANGE UP (ref 3.8–5.2)
RBC # FLD: 16.5 % — HIGH (ref 10.3–14.5)
SODIUM SERPL-SCNC: 140 MMOL/L — SIGNIFICANT CHANGE UP (ref 135–145)
WBC # BLD: 11.34 K/UL — HIGH (ref 3.8–10.5)
WBC # FLD AUTO: 11.34 K/UL — HIGH (ref 3.8–10.5)

## 2020-08-21 PROCEDURE — 94640 AIRWAY INHALATION TREATMENT: CPT

## 2020-08-21 PROCEDURE — 84295 ASSAY OF SERUM SODIUM: CPT

## 2020-08-21 PROCEDURE — 82570 ASSAY OF URINE CREATININE: CPT

## 2020-08-21 PROCEDURE — 82436 ASSAY OF URINE CHLORIDE: CPT

## 2020-08-21 PROCEDURE — 71250 CT THORAX DX C-: CPT

## 2020-08-21 PROCEDURE — 84132 ASSAY OF SERUM POTASSIUM: CPT

## 2020-08-21 PROCEDURE — U0003: CPT

## 2020-08-21 PROCEDURE — 87086 URINE CULTURE/COLONY COUNT: CPT

## 2020-08-21 PROCEDURE — 97116 GAIT TRAINING THERAPY: CPT

## 2020-08-21 PROCEDURE — 83935 ASSAY OF URINE OSMOLALITY: CPT

## 2020-08-21 PROCEDURE — 85014 HEMATOCRIT: CPT

## 2020-08-21 PROCEDURE — 99285 EMERGENCY DEPT VISIT HI MDM: CPT | Mod: 25

## 2020-08-21 PROCEDURE — 80053 COMPREHEN METABOLIC PANEL: CPT

## 2020-08-21 PROCEDURE — 82607 VITAMIN B-12: CPT

## 2020-08-21 PROCEDURE — 83735 ASSAY OF MAGNESIUM: CPT

## 2020-08-21 PROCEDURE — 97161 PT EVAL LOW COMPLEX 20 MIN: CPT

## 2020-08-21 PROCEDURE — 94660 CPAP INITIATION&MGMT: CPT

## 2020-08-21 PROCEDURE — 76770 US EXAM ABDO BACK WALL COMP: CPT

## 2020-08-21 PROCEDURE — 86803 HEPATITIS C AB TEST: CPT

## 2020-08-21 PROCEDURE — 84443 ASSAY THYROID STIM HORMONE: CPT

## 2020-08-21 PROCEDURE — 80061 LIPID PANEL: CPT

## 2020-08-21 PROCEDURE — 71045 X-RAY EXAM CHEST 1 VIEW: CPT

## 2020-08-21 PROCEDURE — 84300 ASSAY OF URINE SODIUM: CPT

## 2020-08-21 PROCEDURE — 86769 SARS-COV-2 COVID-19 ANTIBODY: CPT

## 2020-08-21 PROCEDURE — 97110 THERAPEUTIC EXERCISES: CPT

## 2020-08-21 PROCEDURE — 85027 COMPLETE CBC AUTOMATED: CPT

## 2020-08-21 PROCEDURE — 96374 THER/PROPH/DIAG INJ IV PUSH: CPT

## 2020-08-21 PROCEDURE — 93005 ELECTROCARDIOGRAM TRACING: CPT

## 2020-08-21 PROCEDURE — 80048 BASIC METABOLIC PNL TOTAL CA: CPT

## 2020-08-21 PROCEDURE — 82947 ASSAY GLUCOSE BLOOD QUANT: CPT

## 2020-08-21 PROCEDURE — 36600 WITHDRAWAL OF ARTERIAL BLOOD: CPT

## 2020-08-21 PROCEDURE — 82565 ASSAY OF CREATININE: CPT

## 2020-08-21 PROCEDURE — 82435 ASSAY OF BLOOD CHLORIDE: CPT

## 2020-08-21 PROCEDURE — 84100 ASSAY OF PHOSPHORUS: CPT

## 2020-08-21 PROCEDURE — 82803 BLOOD GASES ANY COMBINATION: CPT

## 2020-08-21 PROCEDURE — 97530 THERAPEUTIC ACTIVITIES: CPT

## 2020-08-21 PROCEDURE — 81001 URINALYSIS AUTO W/SCOPE: CPT

## 2020-08-21 PROCEDURE — 83036 HEMOGLOBIN GLYCOSYLATED A1C: CPT

## 2020-08-21 PROCEDURE — 83605 ASSAY OF LACTIC ACID: CPT

## 2020-08-21 PROCEDURE — 82962 GLUCOSE BLOOD TEST: CPT

## 2020-08-21 PROCEDURE — 82330 ASSAY OF CALCIUM: CPT

## 2020-08-21 RX ORDER — LISINOPRIL 2.5 MG/1
1 TABLET ORAL
Qty: 0 | Refills: 0 | DISCHARGE

## 2020-08-21 RX ORDER — GABAPENTIN 400 MG/1
1 CAPSULE ORAL
Qty: 0 | Refills: 0 | DISCHARGE
Start: 2020-08-21

## 2020-08-21 RX ORDER — POLYETHYLENE GLYCOL 3350 17 G/17G
17 POWDER, FOR SOLUTION ORAL
Qty: 0 | Refills: 0 | DISCHARGE
Start: 2020-08-21

## 2020-08-21 RX ORDER — PANTOPRAZOLE SODIUM 20 MG/1
1 TABLET, DELAYED RELEASE ORAL
Qty: 30 | Refills: 0
Start: 2020-08-21 | End: 2020-09-19

## 2020-08-21 RX ORDER — ERYTHROMYCIN BASE 5 MG/GRAM
1 OINTMENT (GRAM) OPHTHALMIC (EYE)
Qty: 1 | Refills: 0
Start: 2020-08-21 | End: 2020-09-19

## 2020-08-21 RX ORDER — CELECOXIB 200 MG/1
1 CAPSULE ORAL
Qty: 0 | Refills: 0 | DISCHARGE

## 2020-08-21 RX ORDER — POTASSIUM CHLORIDE 20 MEQ
40 PACKET (EA) ORAL EVERY 4 HOURS
Refills: 0 | Status: DISCONTINUED | OUTPATIENT
Start: 2020-08-21 | End: 2020-08-21

## 2020-08-21 RX ORDER — SENNA PLUS 8.6 MG/1
2 TABLET ORAL
Qty: 0 | Refills: 0 | DISCHARGE
Start: 2020-08-21

## 2020-08-21 RX ORDER — GABAPENTIN 400 MG/1
1 CAPSULE ORAL
Qty: 0 | Refills: 0 | DISCHARGE

## 2020-08-21 RX ORDER — DORZOLAMIDE HYDROCHLORIDE TIMOLOL MALEATE 20; 5 MG/ML; MG/ML
1 SOLUTION/ DROPS OPHTHALMIC
Qty: 1 | Refills: 0
Start: 2020-08-21 | End: 2020-09-19

## 2020-08-21 RX ORDER — LANOLIN ALCOHOL/MO/W.PET/CERES
1 CREAM (GRAM) TOPICAL
Qty: 0 | Refills: 0 | DISCHARGE
Start: 2020-08-21

## 2020-08-21 RX ADMIN — Medication 2 UNIT(S): at 12:35

## 2020-08-21 RX ADMIN — PANTOPRAZOLE SODIUM 40 MILLIGRAM(S): 20 TABLET, DELAYED RELEASE ORAL at 05:13

## 2020-08-21 RX ADMIN — Medication 1 APPLICATION(S): at 11:54

## 2020-08-21 RX ADMIN — INSULIN GLARGINE 10 UNIT(S): 100 INJECTION, SOLUTION SUBCUTANEOUS at 09:57

## 2020-08-21 RX ADMIN — DORZOLAMIDE HYDROCHLORIDE TIMOLOL MALEATE 1 DROP(S): 20; 5 SOLUTION/ DROPS OPHTHALMIC at 06:18

## 2020-08-21 RX ADMIN — Medication 1: at 08:53

## 2020-08-21 RX ADMIN — Medication 500 MILLIGRAM(S): at 05:13

## 2020-08-21 RX ADMIN — Medication 2 UNIT(S): at 08:54

## 2020-08-21 RX ADMIN — Medication 1 APPLICATION(S): at 00:09

## 2020-08-21 RX ADMIN — Medication 650 MILLIGRAM(S): at 00:09

## 2020-08-21 RX ADMIN — HEPARIN SODIUM 5000 UNIT(S): 5000 INJECTION INTRAVENOUS; SUBCUTANEOUS at 05:12

## 2020-08-21 RX ADMIN — ATENOLOL 50 MILLIGRAM(S): 25 TABLET ORAL at 05:12

## 2020-08-21 RX ADMIN — Medication 3: at 12:35

## 2020-08-21 RX ADMIN — Medication 1 APPLICATION(S): at 05:12

## 2020-08-21 RX ADMIN — Medication 650 MILLIGRAM(S): at 01:00

## 2020-08-21 RX ADMIN — Medication 145 MILLIGRAM(S): at 11:56

## 2020-08-21 RX ADMIN — Medication 75 MICROGRAM(S): at 05:11

## 2020-08-21 RX ADMIN — AMLODIPINE BESYLATE 5 MILLIGRAM(S): 2.5 TABLET ORAL at 05:11

## 2020-08-21 RX ADMIN — Medication 40 MILLIEQUIVALENT(S): at 09:57

## 2020-08-21 NOTE — PROGRESS NOTE ADULT - ASSESSMENT
63 yo woman with a pmhx of Insulin dependent DM, Hypothyroidism, HLD, HTN, neuropathy, Crohn's disease on immunosuppressants admitted for zoster opthalmicus found to have KIERA    Kiera   scr 0.8 on 8/12  Scr improving daily  KIERA multifactorial -- sec to hyperglycmia, ACE, Ketorolac, Acyclovir and rentention   s/p bladder scan with 700cc urine --s/p forbes. Pt remains non-oliguric   continue to HOLD lisinopril (last dose on 8/13)  Optimize glucose control  AVOID all NSAID use (s/p ketorolac on 8/11)  Acyclovir on hold,- -last dose on 8/12  FENa >1 suggest ATN, Renal US is unremarkable  Urine has minimal protein, RBC + but has forbes  Monitor at present  Use bumex 2mg IV one dose and PRN in view of pulmonary edema in CT chest. S/p Bumex IV as needed   No emergent indication for RRT at present  Consent obtained for RRT if needed  Monitor BMP   AVoid further nephrotoxics, NSAIDS RCA    Hypokalemia  replete KCL 40mEQ x 2 today  Advise replacement until K>3.5    Acidosis:  AG + Non-AG  s/p sodium bicarb drip     HTN  BP elevated   Hold lisinopril  can titrate up amlodipine if needed   Monitor BP  Low salt diet     Hyponatremia  etiology? hyperglycemia contributing  Work up suggest polydipsia/liquid diet intake  Na improved today   Monitor at present

## 2020-08-21 NOTE — PROGRESS NOTE ADULT - PROVIDER SPECIALTY LIST ADULT
Infectious Disease
Internal Medicine
Nephrology
Ophthalmology
Infectious Disease
Infectious Disease
Nephrology
Nephrology

## 2020-08-21 NOTE — PROGRESS NOTE ADULT - REASON FOR ADMISSION
facial pain

## 2020-08-21 NOTE — PROGRESS NOTE ADULT - ASSESSMENT
65 yo female with crohns ,IDDM,hypothyroidism, and HTN admitted with V1 zoster with keratitis  ACV induced KAVIN, starting to improve.renal note appreciated, many additional factors contributing to KAVIN  creat is down to less than 3  Zoster lesions are crusted  Valtrex completed 8/20  Hydration per renal and medicine, creat now down to 1.4  No significant pain issues  No additional ID w/u planned, we will stop actively following, please call if ID issues arise

## 2020-08-21 NOTE — PROGRESS NOTE ADULT - ASSESSMENT
M E D I C A L   A T T E N D I N G    F O L L O W    U P   N O T E                                     ------------------------------------------------------------------------------------------------    patient evaluated by me, case discussed with team, chart, medications, and physical exam reviewed, labs / tests  and vitals reviewed by me, as shireen.   Patient is stable for discharge today.  pt overall better, decided against rehab, going home, urinating well on her own  Patient to follow up with  PMD  See discharge document for full note.  discussed with pt and son in detail, all questions answered, appreciated all  specialists , d/w NP       ==================>> MEDICATIONS <<====================    amLODIPine   Tablet 5 milliGRAM(s) Oral daily  ATENolol  Tablet 50 milliGRAM(s) Oral daily  atorvastatin 40 milliGRAM(s) Oral at bedtime  dextrose 5%. 1000 milliLiter(s) IV Continuous <Continuous>  dextrose 50% Injectable 12.5 Gram(s) IV Push once  dextrose 50% Injectable 25 Gram(s) IV Push once  dextrose 50% Injectable 25 Gram(s) IV Push once  dorzolamide 2%/timolol 0.5% Ophthalmic Solution 1 Drop(s) Left EYE two times a day  erythromycin   Ointment 1 Application(s) Left EYE four times a day  fenofibrate Tablet 145 milliGRAM(s) Oral daily  gabapentin 300 milliGRAM(s) Oral at bedtime  heparin   Injectable 5000 Unit(s) SubCutaneous every 8 hours  insulin glargine Injectable (LANTUS) 10 Unit(s) SubCutaneous at bedtime  insulin glargine Injectable (LANTUS) 10 Unit(s) SubCutaneous every morning  insulin lispro (HumaLOG) corrective regimen sliding scale   SubCutaneous three times a day before meals  insulin lispro Injectable (HumaLOG) 2 Unit(s) SubCutaneous three times a day before meals  levothyroxine 75 MICROGram(s) Oral daily  melatonin 3 milliGRAM(s) Oral at bedtime  mesalamine ER Capsule 500 milliGRAM(s) Oral two times a day  pantoprazole    Tablet 40 milliGRAM(s) Oral before breakfast  polyethylene glycol 3350 17 Gram(s) Oral daily  potassium chloride    Tablet ER 40 milliEquivalent(s) Oral every 4 hours  senna 2 Tablet(s) Oral at bedtime    MEDICATIONS  (PRN):  acetaminophen   Tablet .. 650 milliGRAM(s) Oral every 4 hours PRN Mild Pain (1 - 3)  bisacodyl 5 milliGRAM(s) Oral every 12 hours PRN Constipation  dextrose 40% Gel 15 Gram(s) Oral once PRN Blood Glucose LESS THAN 70 milliGRAM(s)/deciliter  glucagon  Injectable 1 milliGRAM(s) IntraMuscular once PRN Glucose LESS THAN 70 milligrams/deciliter    ___________  Active diet:  Diet, Soft:   Consistent Carbohydrate No Snacks (CSTCHO)  Lacto-Ovo Veg (Accepts Milk Prod., Eggs)  Supplement Feeding Modality:  Oral  Probiotic Yogurt/Smoothie Cans or Servings Per Day:  1       Frequency:  Two Times a day  ___________________    ==================>> VITAL SIGNS <<==================    T(C): 36.2 (08-21-20 @ 14:14), Max: 36.6 (08-20-20 @ 21:51)  HR: 68 (08-21-20 @ 14:14) (68 - 72)  BP: 148/82 (08-21-20 @ 14:14) (148/82 - 158/81)  RR: 17 (08-21-20 @ 14:14) (17 - 18)  SpO2: 95% (08-21-20 @ 14:14) (95% - 96%)     POCT Blood Glucose.: 253 mg/dL (21 Aug 2020 12:15)  POCT Blood Glucose.: 172 mg/dL (21 Aug 2020 08:12)  POCT Blood Glucose.: 211 mg/dL (20 Aug 2020 21:26)  POCT Blood Glucose.: 148 mg/dL (20 Aug 2020 17:18)  I&O's Summary    20 Aug 2020 07:01  -  21 Aug 2020 07:00  --------------------------------------------------------  IN: 1380 mL / OUT: 3950 mL / NET: -2570 mL    21 Aug 2020 07:01  -  21 Aug 2020 15:36  --------------------------------------------------------  IN: 140 mL / OUT: 0 mL / NET: 140 mL       ==================>> LAB AND IMAGING <<==================                        12.0   11.34 )-----------( 468      ( 21 Aug 2020 06:57 )             38.2        08-21    140  |  103  |  21  ----------------------------<  170<H>  3.3<L>   |  25  |  1.41<H>    Ca    9.8      21 Aug 2020 06:57                       TSH:      1.29   (08-16-20)       ,     2.58   (08-12-20)           Lipid profile:  (08-12-20)     Total: 149     LDL  : 77     HDL  :38     TG   :173     HgA1C:    (08-12-20)      7.5    WBC count:   11.34 <<== ,  11.60 <<== ,  11.22 <<== ,  9.62 <<== ,  9.80 <<==   Hemoglobin:   12.0 <<==,  11.8 <<==,  11.5 <<==,  11.6 <<==,  11.6 <<==  platelets:  468 <==, 516 <==, 530 <==, 476 <==, 434 <==, 358 <==, 375 <==    Creatinine:  1.41  <<==, 1.65  <<==, 1.76  <<==, 2.96  <<==, 4.27  <<==, 5.71  <<==  Sodium:   140  <==, 139  <==, 140  <==, 142  <==, 142  <==, 136  <==, 133  <==

## 2020-08-21 NOTE — PROGRESS NOTE ADULT - SUBJECTIVE AND OBJECTIVE BOX
CC: f/u for zoster opthalmicus    Patient reports: scalp itch, able to void without forbes, no headache    REVIEW OF SYSTEMS:  All other review of systems negative (Comprehensive ROS)    Antimicrobials Day #  :off    Other Medications Reviewed    T(F): 97.9 (08-21-20 @ 05:50), Max: 97.9 (08-21-20 @ 05:50)  HR: 70 (08-21-20 @ 05:50)  BP: 158/81 (08-21-20 @ 05:50)  RR: 18 (08-21-20 @ 05:50)  SpO2: 96% (08-21-20 @ 11:45)  Wt(kg): --    PHYSICAL EXAM:  General: alert, no acute distress  Eyes:  anicteric, no conjunctival injection, no discharge, crusted lesions all healing  Oropharynx: no lesions or injection 	  Neck: supple, without adenopathy  Lungs: clear to auscultation  Heart: regular rate and rhythm; no murmur, rubs or gallops  Abdomen: soft, nondistended, nontender, without mass or organomegaly  Skin: no lesions  Extremities: no clubbing, cyanosis, or edema  Neurologic: alert, oriented, moves all extremities    LAB RESULTS:                        12.0   11.34 )-----------( 468      ( 21 Aug 2020 06:57 )             38.2     08-21    140  |  103  |  21  ----------------------------<  170<H>  3.3<L>   |  25  |  1.41<H>    Ca    9.8      21 Aug 2020 06:57          MICROBIOLOGY:  RECENT CULTURES:      RADIOLOGY REVIEWED:

## 2020-08-21 NOTE — PROGRESS NOTE ADULT - SUBJECTIVE AND OBJECTIVE BOX
[facility]   MD MARIELA SLOAN MD RUORU WONG, PA    TEL:  OFFICE: [phone]  DR WINSLOW CELL: [phone]  JAYME BEAVERS CELL: [phone]  DR. THAPA CELL: [phone]  DR. JIMENEZ CELL: [phone]    FROM 5 PM - 7 AM PLEASE CALL ANSWERING SERVICE: [phone]    RENAL FOLLOW UP NOTE  --------------------------------------------------------------------------------  HPI:      Pt seen and examined at bedside.   sitting up in chair  Denies SOB, chest pain     PAST HISTORY  --------------------------------------------------------------------------------  No significant changes to PMH, PSH, FHx, SHx, unless otherwise noted    ALLERGIES & MEDICATIONS  --------------------------------------------------------------------------------  Allergies    No Known Allergies    Intolerances      Standing Inpatient Medications  amLODIPine   Tablet 5 milliGRAM(s) Oral daily  ATENolol  Tablet 50 milliGRAM(s) Oral daily  atorvastatin 40 milliGRAM(s) Oral at bedtime  dextrose 5%. 1000 milliLiter(s) IV Continuous <Continuous>  dextrose 50% Injectable 12.5 Gram(s) IV Push once  dextrose 50% Injectable 25 Gram(s) IV Push once  dextrose 50% Injectable 25 Gram(s) IV Push once  dorzolamide 2%/timolol 0.5% Ophthalmic Solution 1 Drop(s) Left EYE two times a day  erythromycin   Ointment 1 Application(s) Left EYE four times a day  fenofibrate Tablet 145 milliGRAM(s) Oral daily  gabapentin 300 milliGRAM(s) Oral at bedtime  heparin   Injectable 5000 Unit(s) SubCutaneous every 8 hours  insulin glargine Injectable (LANTUS) 10 Unit(s) SubCutaneous at bedtime  insulin glargine Injectable (LANTUS) 10 Unit(s) SubCutaneous every morning  insulin lispro (HumaLOG) corrective regimen sliding scale   SubCutaneous three times a day before meals  insulin lispro Injectable (HumaLOG) 2 Unit(s) SubCutaneous three times a day before meals  levothyroxine 75 MICROGram(s) Oral daily  melatonin 3 milliGRAM(s) Oral at bedtime  mesalamine ER Capsule 500 milliGRAM(s) Oral two times a day  pantoprazole    Tablet 40 milliGRAM(s) Oral before breakfast  polyethylene glycol 3350 17 Gram(s) Oral daily  potassium chloride    Tablet ER 40 milliEquivalent(s) Oral every 4 hours  senna 2 Tablet(s) Oral at bedtime    PRN Inpatient Medications  acetaminophen   Tablet .. 650 milliGRAM(s) Oral every 4 hours PRN  bisacodyl 5 milliGRAM(s) Oral every 12 hours PRN  dextrose 40% Gel 15 Gram(s) Oral once PRN  glucagon  Injectable 1 milliGRAM(s) IntraMuscular once PRN      REVIEW OF SYSTEMS  --------------------------------------------------------------------------------  General: no fever  CVS: no chest pain  RESP: no sob, no cough  ABD: no abdominal pain  MSK: no edema     VITALS/PHYSICAL EXAM  --------------------------------------------------------------------------------  T(C): 36.6 (08-21-20 @ 05:50), Max: 36.6 (08-20-20 @ 11:46)  HR: 70 (08-21-20 @ 05:50) (69 - 72)  BP: 158/81 (08-21-20 @ 05:50) (134/82 - 158/81)  RR: 18 (08-21-20 @ 05:50) (18 - 18)  SpO2: 96% (08-21-20 @ 05:50) (95% - 96%)  Wt(kg): --        08-20-20 @ 07:01  -  08-21-20 @ 07:00  --------------------------------------------------------  IN: 1380 mL / OUT: 3950 mL / NET: -2570 mL      Physical Exam:  	Gen: NAD  	HEENT: MMM  	Pulm: CTA B/L  	CV: S1S2  	Abd: Soft, +BS  	Ext: No LE edema B/L                      Neuro: Awake   	Skin: Warm and Dry   	Vascular access: no hd catheter           no  andrei  LABS/STUDIES  --------------------------------------------------------------------------------              12.0   11.34 >-----------<  468      [08-21-20 @ 06:57]              38.2     140  |  103  |  21  ----------------------------<  170      [08-21-20 @ 06:57]  3.3   |  25  |  1.41        Ca     9.8     [08-21-20 @ 06:57]            Creatinine Trend:  SCr 1.41 [08-21 @ 06:57]  SCr 1.65 [08-20 @ 17:40]  SCr 1.76 [08-20 @ 07:12]  SCr 2.96 [08-19 @ 06:38]  SCr 4.27 [08-18 @ 07:32]    Urinalysis - [08-14-20 @ 09:20]      Color Light Yellow / Appearance Clear / SG 1.006 / pH 6.5      Gluc Negative / Ketone Negative  / Bili Negative / Urobili <2 mg/dL       Blood Negative / Protein 30 mg/dL / Leuk Est Negative / Nitrite Negative      RBC 11 / WBC 1 / Hyaline 0 / Gran  / Sq Epi  / Non Sq Epi 1 / Bacteria Negative    Urine Creatinine 25      [08-15-20 @ 12:37]  Urine Sodium 79      [08-15-20 @ 12:37]  Urine Osmolality 147      [08-14-20 @ 09:20]    TSH 1.29      [08-16-20 @ 09:36]  Lipid: chol 149, , HDL 38, LDL 77      [08-12-20 @ 12:15]    HCV 0.11, Nonreact      [08-13-20 @ 09:04]

## 2020-09-10 NOTE — PROGRESS NOTE BEHAVIORAL HEALTH - NSBHCONSULTDISCUSS_PSY_A_CORE
P Quality Flow/Interdisciplinary Rounds Progress Note        Quality Flow Rounds held on September 10, 2020    Disciplines Attending:  Bedside Nurse, ,  and Nursing Unit Leadership    Jhonatan Laird was admitted on 9/5/2020  9:26 PM    Anticipated Discharge Date:  Expected Discharge Date: 09/08/20    Disposition:    Landon Score:  Landon Scale Score: 20    Readmission Risk              Risk of Unplanned Readmission:        11           Discussed patient goal for the day, patient clinical progression, and barriers to discharge.   The following Goal(s) of the Day/Commitment(s) have been identified:  Discharge Planning      [facility]t  September 10, 2020 yes

## 2022-07-29 NOTE — DISCHARGE NOTE PROVIDER - NSDCHOSPICE_GEN_A_CORE
Pt awakens easily. Accepted AM meds without problem with water. Abdomen dressing is c/d/I. LUQ colostomy present. Stoma pink. Small amount brownish watery drainage in bag. SCDs on. Dr. Jordy Angela was in to see pt. No

## 2022-12-21 NOTE — PROVIDER CONTACT NOTE (CRITICAL VALUE NOTIFICATION) - NAME OF MD/NP/PA/DO NOTIFIED:
Diabetes Management Status    Statin: Taking  ACE/ARB: Taking    Screening or Prevention Patient's value Goal Complete/Controlled?   HgA1C Testing and Control   Lab Results   Component Value Date    HGBA1C 6.1 (H) 11/03/2022      Annually/Less than 8% Yes   Lipid profile : 09/02/2022 Annually Yes   LDL control Lab Results   Component Value Date    LDLCALC 78.8 09/02/2022    Annually/Less than 100 mg/dl  Yes   Nephropathy screening Lab Results   Component Value Date    LABMICR <5.0 09/02/2022     Lab Results   Component Value Date    PROTEINUA Negative 11/28/2014    Annually Yes   Blood pressure BP Readings from Last 1 Encounters:   12/21/22 130/88    Less than 140/90 Yes   Dilated retinal exam Most Recent Eye Exam Date: Not Found Annually Yes   Foot exam   : 10/24/2022 Annually Yes     Lab Results   Component Value Date    HGBA1C 6.1 (H) 11/03/2022    HGBA1C 6.1 (H) 09/02/2022    EGFRNORACEVR 30.1 (A) 11/16/2022    MICALBCREAT Unable to calculate 09/02/2022    LDLCALC 78.8 09/02/2022     No results found for: GLUTAMICACID, CPEPTIDE   Last 5 Patient Entered Readings                                          Most Recent A1c: 6.1% on 11/3/2022  (Goal: 8%)     Recent Readings 12/19/2022 12/17/2022 12/15/2022 12/12/2022 12/8/2022    Blood Glucose (mg/dL) 141 126 171 148 197         HEALTH MAINTENANCE: Diabetic health maintenance interventions reviewed and are up to date except for:      Topic    Eye Exam        aliyah [id number]

## 2023-04-06 ENCOUNTER — NON-APPOINTMENT (OUTPATIENT)
Age: 68
End: 2023-04-06

## 2023-07-19 ENCOUNTER — APPOINTMENT (OUTPATIENT)
Dept: CARDIOLOGY | Facility: CLINIC | Age: 68
End: 2023-07-19

## 2024-06-16 NOTE — BEHAVIORAL HEALTH ASSESSMENT NOTE - AFFECT CONGRUENCE
Patient requests all Lab, Cardiology, and Radiology Results on their Discharge Instructions
Not congruent